# Patient Record
Sex: FEMALE | Race: WHITE | NOT HISPANIC OR LATINO | Employment: PART TIME | URBAN - METROPOLITAN AREA
[De-identification: names, ages, dates, MRNs, and addresses within clinical notes are randomized per-mention and may not be internally consistent; named-entity substitution may affect disease eponyms.]

---

## 2017-07-30 ENCOUNTER — HOSPITAL ENCOUNTER (OUTPATIENT)
Dept: PERIOP | Facility: HOSPITAL | Age: 57
Setting detail: HOSPITAL OUTPATIENT SURGERY
Discharge: HOME | End: 2017-07-30
Attending: ORTHOPAEDIC SURGERY | Admitting: ORTHOPAEDIC SURGERY

## 2017-08-29 ENCOUNTER — HOSPITAL ENCOUNTER (OUTPATIENT)
Dept: FAMILY MEDICINE CLINIC | Facility: CLINIC | Age: 57
Setting detail: SPECIMEN
Discharge: HOME | End: 2017-08-29
Attending: INTERNAL MEDICINE | Admitting: INTERNAL MEDICINE

## 2017-08-29 LAB
ALBUMIN SERPL-MCNC: 4.4 G/DL (ref 3.5–4.8)
ALBUMIN/GLOB SERPL: 1.7 {RATIO} (ref 1–1.7)
ALP SERPL-CCNC: 59 IU/L (ref 32–91)
ALT SERPL-CCNC: 21 IU/L (ref 14–54)
ANION GAP SERPL CALC-SCNC: 12.8 MMOL/L (ref 10–20)
AST SERPL-CCNC: 22 IU/L (ref 15–41)
BILIRUB SERPL-MCNC: 0.8 MG/DL (ref 0.3–1.2)
BUN SERPL-MCNC: 17 MG/DL (ref 8–20)
BUN/CREAT SERPL: 18.9 (ref 5.4–26.2)
CALCIUM SERPL-MCNC: 9.4 MG/DL (ref 8.9–10.3)
CHLORIDE SERPL-SCNC: 106 MMOL/L (ref 101–111)
CONV CO2: 24 MMOL/L (ref 22–32)
CONV TOTAL PROTEIN: 7 G/DL (ref 6.1–7.9)
CREAT UR-MCNC: 0.9 MG/DL (ref 0.4–1)
CRP SERPL-MCNC: 0.19 MG/DL (ref 0–0.7)
ERYTHROCYTE [SEDIMENTATION RATE] IN BLOOD BY WESTERGREN METHOD: 11 MM/HR (ref 0–30)
GLOBULIN UR ELPH-MCNC: 2.6 G/DL (ref 2.5–3.8)
GLUCOSE SERPL-MCNC: 82 MG/DL (ref 65–99)
POTASSIUM SERPL-SCNC: 3.8 MMOL/L (ref 3.6–5.1)
SODIUM SERPL-SCNC: 139 MMOL/L (ref 136–144)
T4 FREE SERPL-MCNC: 0.76 NG/DL (ref 0.58–1.64)
TSH SERPL-ACNC: 2.92 UIU/ML (ref 0.34–5.6)

## 2017-08-30 LAB
ANA SER QL IA: NORMAL
CHROMATIN AB SERPL-ACNC: <20 [IU]/ML (ref 0–20)

## 2017-12-05 ENCOUNTER — HOSPITAL ENCOUNTER (OUTPATIENT)
Dept: MAMMOGRAPHY | Facility: HOSPITAL | Age: 57
Discharge: HOME | End: 2017-12-05
Attending: NURSE PRACTITIONER | Admitting: NURSE PRACTITIONER

## 2017-12-11 ENCOUNTER — HOSPITAL ENCOUNTER (OUTPATIENT)
Dept: MAMMOGRAPHY | Facility: HOSPITAL | Age: 57
Discharge: HOME | End: 2017-12-11
Attending: NURSE PRACTITIONER | Admitting: NURSE PRACTITIONER

## 2018-01-08 ENCOUNTER — HOSPITAL ENCOUNTER (OUTPATIENT)
Dept: FAMILY MEDICINE CLINIC | Facility: CLINIC | Age: 58
Setting detail: SPECIMEN
Discharge: HOME | End: 2018-01-08
Attending: NURSE PRACTITIONER | Admitting: NURSE PRACTITIONER

## 2018-01-08 LAB
BASOPHILS # BLD AUTO: 0.1 10*3/UL (ref 0–0.2)
BASOPHILS NFR BLD AUTO: 1 % (ref 0–2)
DIFFERENTIAL METHOD BLD: (no result)
EOSINOPHIL # BLD AUTO: 0.1 10*3/UL (ref 0–0.3)
EOSINOPHIL # BLD AUTO: 1 % (ref 0–3)
ERYTHROCYTE [DISTWIDTH] IN BLOOD BY AUTOMATED COUNT: 14.1 % (ref 11.5–14.5)
HCT VFR BLD AUTO: 39.3 % (ref 35–49)
HGB BLD-MCNC: 13.1 G/DL (ref 12–15)
LYMPHOCYTES # BLD AUTO: 2.6 10*3/UL (ref 0.8–4.8)
LYMPHOCYTES NFR BLD AUTO: 35 % (ref 18–42)
MCH RBC QN AUTO: 28.9 PG (ref 26–32)
MCHC RBC AUTO-ENTMCNC: 33.3 G/DL (ref 32–36)
MCV RBC AUTO: 86.7 FL (ref 80–94)
MONOCYTES # BLD AUTO: 1 10*3/UL (ref 0.1–1.3)
MONOCYTES NFR BLD AUTO: 14 % (ref 2–11)
NEUTROPHILS # BLD AUTO: 3.8 10*3/UL (ref 2.3–8.6)
NEUTROPHILS NFR BLD AUTO: 49 % (ref 50–75)
NRBC BLD AUTO-RTO: 0 /100{WBCS}
NRBC/RBC NFR BLD MANUAL: 0 10*3/UL
PLATELET # BLD AUTO: 266 10*3/UL (ref 150–450)
PMV BLD AUTO: 8.9 FL (ref 7.4–10.4)
RBC # BLD AUTO: 4.53 10*6/UL (ref 4–5.4)
WBC # BLD AUTO: 7.6 10*3/UL (ref 4.5–11.5)

## 2018-03-16 ENCOUNTER — HOSPITAL ENCOUNTER (OUTPATIENT)
Dept: LAB | Facility: HOSPITAL | Age: 58
Discharge: HOME | End: 2018-03-16
Attending: INTERNAL MEDICINE | Admitting: INTERNAL MEDICINE

## 2018-03-16 LAB
ALBUMIN SERPL-MCNC: 4.1 G/DL (ref 3.5–4.8)
ALBUMIN/GLOB SERPL: 1.5 {RATIO} (ref 1–1.7)
ALP SERPL-CCNC: 47 IU/L (ref 32–91)
ALT SERPL-CCNC: 16 IU/L (ref 14–54)
ANA SER QL IA: NORMAL
ANION GAP SERPL CALC-SCNC: 7.8 MMOL/L (ref 10–20)
AST SERPL-CCNC: 20 IU/L (ref 15–41)
BILIRUB SERPL-MCNC: 1 MG/DL (ref 0.3–1.2)
BUN SERPL-MCNC: 11 MG/DL (ref 8–20)
BUN/CREAT SERPL: 12.2 (ref 5.4–26.2)
CALCIUM SERPL-MCNC: 9.5 MG/DL (ref 8.9–10.3)
CCP IGG ANTIBODIES: <0.4 U/ML
CHLORIDE SERPL-SCNC: 107 MMOL/L (ref 101–111)
CONV CO2: 29 MMOL/L (ref 22–32)
CONV TOTAL PROTEIN: 6.8 G/DL (ref 6.1–7.9)
CREAT UR-MCNC: 0.9 MG/DL (ref 0.4–1)
CRP SERPL-MCNC: 0.18 MG/DL (ref 0–0.7)
ERYTHROCYTE [DISTWIDTH] IN BLOOD BY AUTOMATED COUNT: 14.5 % (ref 11.5–14.5)
ERYTHROCYTE [SEDIMENTATION RATE] IN BLOOD BY WESTERGREN METHOD: 9 MM/HR (ref 0–30)
GLOBULIN UR ELPH-MCNC: 2.7 G/DL (ref 2.5–3.8)
GLUCOSE SERPL-MCNC: 91 MG/DL (ref 65–99)
HCT VFR BLD AUTO: 39.5 % (ref 35–49)
HGB BLD-MCNC: 13.1 G/DL (ref 12–15)
MCH RBC QN AUTO: 28.7 PG (ref 26–32)
MCHC RBC AUTO-ENTMCNC: 33.3 G/DL (ref 32–36)
MCV RBC AUTO: 86.3 FL (ref 80–94)
PLATELET # BLD AUTO: 241 10*3/UL (ref 150–450)
PMV BLD AUTO: 9.2 FL (ref 7.4–10.4)
POTASSIUM SERPL-SCNC: 3.8 MMOL/L (ref 3.6–5.1)
RBC # BLD AUTO: 4.57 10*6/UL (ref 4–5.4)
SODIUM SERPL-SCNC: 140 MMOL/L (ref 136–144)
WBC # BLD AUTO: 4.5 10*3/UL (ref 4.5–11.5)

## 2018-03-19 LAB — CHROMATIN AB SERPL-ACNC: <20 [IU]/ML (ref 0–20)

## 2018-06-15 ENCOUNTER — HOSPITAL ENCOUNTER (OUTPATIENT)
Dept: OTHER | Facility: HOSPITAL | Age: 58
Discharge: HOME | End: 2018-06-15
Attending: FAMILY MEDICINE | Admitting: FAMILY MEDICINE

## 2018-08-29 ENCOUNTER — HOSPITAL ENCOUNTER (OUTPATIENT)
Dept: LAB | Facility: HOSPITAL | Age: 58
Discharge: HOME | End: 2018-08-29
Attending: NURSE PRACTITIONER | Admitting: NURSE PRACTITIONER

## 2018-08-29 LAB
ALBUMIN SERPL-MCNC: 4.2 G/DL (ref 3.5–4.8)
ALBUMIN/GLOB SERPL: 1.5 {RATIO} (ref 1–1.7)
ALP SERPL-CCNC: 49 IU/L (ref 32–91)
ALT SERPL-CCNC: 24 IU/L (ref 14–54)
ANION GAP SERPL CALC-SCNC: 14 MMOL/L (ref 10–20)
AST SERPL-CCNC: 25 IU/L (ref 15–41)
BILIRUB SERPL-MCNC: 1 MG/DL (ref 0.3–1.2)
BILIRUB UR QL STRIP: NEGATIVE MG/DL
BUN SERPL-MCNC: 12 MG/DL (ref 8–20)
BUN/CREAT SERPL: 12 (ref 5.4–26.2)
CALCIUM SERPL-MCNC: 9.4 MG/DL (ref 8.9–10.3)
CASTS URNS QL MICRO: NORMAL /[LPF]
CHLORIDE SERPL-SCNC: 106 MMOL/L (ref 101–111)
CK SERPL-CCNC: 144 IU/L (ref 38–234)
COLOR UR: YELLOW
CONV BACTERIA IN URINE MICRO: NEGATIVE
CONV CLARITY OF URINE: CLEAR
CONV CO2: 25 MMOL/L (ref 22–32)
CONV HYALINE CASTS IN URINE MICRO: 0 /[LPF] (ref 0–5)
CONV PROTEIN IN URINE BY AUTOMATED TEST STRIP: NEGATIVE MG/DL
CONV SMALL ROUND CELLS: NORMAL /[HPF]
CONV TOTAL PROTEIN: 7 G/DL (ref 6.1–7.9)
CONV UROBILINOGEN IN URINE BY AUTOMATED TEST STRIP: 0.2 MG/DL
CREAT UR-MCNC: 1 MG/DL (ref 0.4–1)
CRP SERPL-MCNC: 0.06 MG/DL (ref 0–0.7)
CULTURE INDICATED?: NORMAL
ERYTHROCYTE [SEDIMENTATION RATE] IN BLOOD BY WESTERGREN METHOD: 13 MM/HR (ref 0–30)
GLOBULIN UR ELPH-MCNC: 2.8 G/DL (ref 2.5–3.8)
GLUCOSE SERPL-MCNC: 113 MG/DL (ref 65–99)
GLUCOSE UR QL: NEGATIVE MG/DL
HGB UR QL STRIP: NEGATIVE
KETONES UR QL STRIP: NEGATIVE MG/DL
LEUKOCYTE ESTERASE UR QL STRIP: NEGATIVE
MAGNESIUM SERPL-MCNC: 2 MG/DL (ref 1.8–2.5)
NITRITE UR QL STRIP: NEGATIVE
PH UR STRIP.AUTO: 7.5 [PH] (ref 4.5–8)
POTASSIUM SERPL-SCNC: 4 MMOL/L (ref 3.6–5.1)
RBC #/AREA URNS HPF: 0 /[HPF] (ref 0–3)
SODIUM SERPL-SCNC: 141 MMOL/L (ref 136–144)
SP GR UR: 1.01 (ref 1–1.03)
SPERM URNS QL MICRO: NORMAL /[HPF]
SQUAMOUS SPT QL MICRO: 2 /[HPF] (ref 0–5)
UNIDENT CRYS URNS QL MICRO: NORMAL /[HPF]
URATE SERPL-MCNC: 4.4 MG/DL (ref 2.6–8)
WBC #/AREA URNS HPF: 2 /[HPF] (ref 0–5)
YEAST SPEC QL WET PREP: NORMAL /[HPF]

## 2018-11-13 ENCOUNTER — HOSPITAL ENCOUNTER (OUTPATIENT)
Dept: MAMMOGRAPHY | Facility: HOSPITAL | Age: 58
Discharge: HOME | End: 2018-11-13
Attending: NURSE PRACTITIONER | Admitting: NURSE PRACTITIONER

## 2018-12-17 ENCOUNTER — HOSPITAL ENCOUNTER (OUTPATIENT)
Dept: URGENT CARE | Facility: CLINIC | Age: 58
Discharge: HOME | End: 2018-12-17
Attending: EMERGENCY MEDICINE | Admitting: EMERGENCY MEDICINE

## 2019-01-29 ENCOUNTER — HOSPITAL ENCOUNTER (OUTPATIENT)
Dept: FAMILY MEDICINE CLINIC | Facility: CLINIC | Age: 59
Setting detail: SPECIMEN
Discharge: HOME | End: 2019-01-29
Attending: INTERNAL MEDICINE | Admitting: INTERNAL MEDICINE

## 2019-01-29 LAB
BACTERIA SPEC AEROBE CULT: NORMAL
Lab: NORMAL
MICRO REPORT STATUS: NORMAL
SPECIMEN SOURCE: NORMAL

## 2019-06-05 ENCOUNTER — ON CAMPUS - OUTPATIENT (AMBULATORY)
Dept: URBAN - METROPOLITAN AREA HOSPITAL 2 | Facility: HOSPITAL | Age: 59
End: 2019-06-05
Payer: COMMERCIAL

## 2019-06-05 ENCOUNTER — OFFICE (AMBULATORY)
Dept: URBAN - METROPOLITAN AREA PATHOLOGY 4 | Facility: PATHOLOGY | Age: 59
End: 2019-06-05
Payer: COMMERCIAL

## 2019-06-05 VITALS
OXYGEN SATURATION: 100 % | WEIGHT: 243 LBS | HEART RATE: 60 BPM | DIASTOLIC BLOOD PRESSURE: 56 MMHG | HEART RATE: 73 BPM | RESPIRATION RATE: 16 BRPM | HEIGHT: 67 IN | HEART RATE: 68 BPM | HEART RATE: 66 BPM | OXYGEN SATURATION: 95 % | DIASTOLIC BLOOD PRESSURE: 65 MMHG | OXYGEN SATURATION: 97 % | SYSTOLIC BLOOD PRESSURE: 135 MMHG | DIASTOLIC BLOOD PRESSURE: 77 MMHG | OXYGEN SATURATION: 98 % | DIASTOLIC BLOOD PRESSURE: 74 MMHG | DIASTOLIC BLOOD PRESSURE: 70 MMHG | TEMPERATURE: 97.6 F | SYSTOLIC BLOOD PRESSURE: 147 MMHG | SYSTOLIC BLOOD PRESSURE: 179 MMHG | DIASTOLIC BLOOD PRESSURE: 80 MMHG | SYSTOLIC BLOOD PRESSURE: 173 MMHG | RESPIRATION RATE: 18 BRPM | HEART RATE: 65 BPM | SYSTOLIC BLOOD PRESSURE: 194 MMHG | SYSTOLIC BLOOD PRESSURE: 204 MMHG | SYSTOLIC BLOOD PRESSURE: 106 MMHG | DIASTOLIC BLOOD PRESSURE: 73 MMHG | HEART RATE: 64 BPM | HEART RATE: 62 BPM | OXYGEN SATURATION: 99 % | DIASTOLIC BLOOD PRESSURE: 71 MMHG | SYSTOLIC BLOOD PRESSURE: 109 MMHG | OXYGEN SATURATION: 96 % | SYSTOLIC BLOOD PRESSURE: 127 MMHG

## 2019-06-05 DIAGNOSIS — D12.5 BENIGN NEOPLASM OF SIGMOID COLON: ICD-10-CM

## 2019-06-05 DIAGNOSIS — K57.30 DIVERTICULOSIS OF LARGE INTESTINE WITHOUT PERFORATION OR ABS: ICD-10-CM

## 2019-06-05 DIAGNOSIS — Z12.11 ENCOUNTER FOR SCREENING FOR MALIGNANT NEOPLASM OF COLON: ICD-10-CM

## 2019-06-05 LAB
GI HISTOLOGY: A. UNSPECIFIED: (no result)
GI HISTOLOGY: PDF REPORT: (no result)

## 2019-06-05 PROCEDURE — 45385 COLONOSCOPY W/LESION REMOVAL: CPT | Performed by: INTERNAL MEDICINE

## 2019-06-05 PROCEDURE — 45380 COLONOSCOPY AND BIOPSY: CPT | Mod: 59 | Performed by: INTERNAL MEDICINE

## 2019-06-05 PROCEDURE — 88305 TISSUE EXAM BY PATHOLOGIST: CPT | Mod: 33 | Performed by: INTERNAL MEDICINE

## 2019-06-11 ENCOUNTER — CONVERSION ENCOUNTER (OUTPATIENT)
Dept: FAMILY MEDICINE CLINIC | Facility: CLINIC | Age: 59
End: 2019-06-11

## 2019-06-12 VITALS
HEART RATE: 76 BPM | BODY MASS INDEX: 40.81 KG/M2 | HEIGHT: 67 IN | RESPIRATION RATE: 16 BRPM | WEIGHT: 260 LBS | SYSTOLIC BLOOD PRESSURE: 124 MMHG | DIASTOLIC BLOOD PRESSURE: 60 MMHG

## 2019-07-19 DIAGNOSIS — M35.00 SJOGREN'S SYNDROME, WITH UNSPECIFIED ORGAN INVOLVEMENT (HCC): ICD-10-CM

## 2019-07-19 DIAGNOSIS — M15.9 OSTEOARTHRITIS, GENERALIZED: Primary | ICD-10-CM

## 2019-07-19 DIAGNOSIS — E55.9 VITAMIN D DEFICIENCY: ICD-10-CM

## 2019-07-19 DIAGNOSIS — M79.10 MYALGIA: ICD-10-CM

## 2019-07-19 DIAGNOSIS — M19.90 INFLAMMATORY ARTHRITIS: ICD-10-CM

## 2019-07-26 ENCOUNTER — LAB (OUTPATIENT)
Dept: LAB | Facility: HOSPITAL | Age: 59
End: 2019-07-26

## 2019-07-26 DIAGNOSIS — M35.00 SJOGREN'S SYNDROME, WITH UNSPECIFIED ORGAN INVOLVEMENT (HCC): ICD-10-CM

## 2019-07-26 DIAGNOSIS — E55.9 VITAMIN D DEFICIENCY: ICD-10-CM

## 2019-07-26 DIAGNOSIS — M15.9 OSTEOARTHRITIS, GENERALIZED: ICD-10-CM

## 2019-07-26 DIAGNOSIS — M19.90 INFLAMMATORY ARTHRITIS: ICD-10-CM

## 2019-07-26 DIAGNOSIS — M79.10 MYALGIA: ICD-10-CM

## 2019-07-26 LAB
25(OH)D3 SERPL-MCNC: 36.6 NG/ML (ref 30–100)
ALBUMIN SERPL-MCNC: 4.1 G/DL (ref 3.5–4.8)
ALBUMIN/GLOB SERPL: 1.8 G/DL (ref 1–1.7)
ALP SERPL-CCNC: 46 U/L (ref 32–91)
ALT SERPL W P-5'-P-CCNC: 21 U/L (ref 14–54)
ANION GAP SERPL CALCULATED.3IONS-SCNC: 12.3 MMOL/L (ref 5–15)
AST SERPL-CCNC: 23 U/L (ref 15–41)
BASOPHILS # BLD AUTO: 0 10*3/MM3 (ref 0–0.2)
BASOPHILS NFR BLD AUTO: 1.2 % (ref 0–1.5)
BILIRUB SERPL-MCNC: 1 MG/DL (ref 0.3–1.2)
BUN BLD-MCNC: 20 MG/DL (ref 8–20)
BUN/CREAT SERPL: 22.2 (ref 5.4–26.2)
CALCIUM SPEC-SCNC: 8.6 MG/DL (ref 8.9–10.3)
CHLORIDE SERPL-SCNC: 107 MMOL/L (ref 101–111)
CO2 SERPL-SCNC: 24 MMOL/L (ref 22–32)
CREAT BLD-MCNC: 0.9 MG/DL (ref 0.4–1)
CRP SERPL-MCNC: 0.13 MG/DL (ref 0–0.7)
DEPRECATED RDW RBC AUTO: 45.5 FL (ref 37–54)
EOSINOPHIL # BLD AUTO: 0.1 10*3/MM3 (ref 0–0.4)
EOSINOPHIL NFR BLD AUTO: 2.7 % (ref 0.3–6.2)
ERYTHROCYTE [DISTWIDTH] IN BLOOD BY AUTOMATED COUNT: 14.7 % (ref 12.3–15.4)
ERYTHROCYTE [SEDIMENTATION RATE] IN BLOOD: 13 MM/HR (ref 0–30)
GFR SERPL CREATININE-BSD FRML MDRD: 64 ML/MIN/1.73
GLOBULIN UR ELPH-MCNC: 2.3 GM/DL (ref 2.5–3.8)
GLUCOSE BLD-MCNC: 77 MG/DL (ref 65–99)
HCT VFR BLD AUTO: 36.2 % (ref 34–46.6)
HGB BLD-MCNC: 12.3 G/DL (ref 12–15.9)
LYMPHOCYTES # BLD AUTO: 1.7 10*3/MM3 (ref 0.7–3.1)
LYMPHOCYTES NFR BLD AUTO: 41.5 % (ref 19.6–45.3)
MCH RBC QN AUTO: 29.9 PG (ref 26.6–33)
MCHC RBC AUTO-ENTMCNC: 33.9 G/DL (ref 31.5–35.7)
MCV RBC AUTO: 88.3 FL (ref 79–97)
MONOCYTES # BLD AUTO: 0.5 10*3/MM3 (ref 0.1–0.9)
MONOCYTES NFR BLD AUTO: 12.8 % (ref 5–12)
NEUTROPHILS # BLD AUTO: 1.7 10*3/MM3 (ref 1.7–7)
NEUTROPHILS NFR BLD AUTO: 41.8 % (ref 42.7–76)
NRBC BLD AUTO-RTO: 0.1 /100 WBC (ref 0–0.2)
PLATELET # BLD AUTO: 195 10*3/MM3 (ref 140–450)
PMV BLD AUTO: 9.1 FL (ref 6–12)
POTASSIUM BLD-SCNC: 4.3 MMOL/L (ref 3.6–5.1)
PROT SERPL-MCNC: 6.4 G/DL (ref 6.1–7.9)
RBC # BLD AUTO: 4.1 10*6/MM3 (ref 3.77–5.28)
SODIUM BLD-SCNC: 139 MMOL/L (ref 136–144)
WBC NRBC COR # BLD: 4 10*3/MM3 (ref 3.4–10.8)

## 2019-07-26 PROCEDURE — 82306 VITAMIN D 25 HYDROXY: CPT | Performed by: INTERNAL MEDICINE

## 2019-07-26 PROCEDURE — 85652 RBC SED RATE AUTOMATED: CPT | Performed by: INTERNAL MEDICINE

## 2019-07-26 PROCEDURE — 80053 COMPREHEN METABOLIC PANEL: CPT | Performed by: INTERNAL MEDICINE

## 2019-07-26 PROCEDURE — 86140 C-REACTIVE PROTEIN: CPT | Performed by: INTERNAL MEDICINE

## 2019-07-26 PROCEDURE — 36415 COLL VENOUS BLD VENIPUNCTURE: CPT

## 2019-07-26 PROCEDURE — 85027 COMPLETE CBC AUTOMATED: CPT | Performed by: INTERNAL MEDICINE

## 2019-07-29 ENCOUNTER — TELEPHONE (OUTPATIENT)
Dept: FAMILY MEDICINE CLINIC | Facility: CLINIC | Age: 59
End: 2019-07-29

## 2019-07-29 NOTE — TELEPHONE ENCOUNTER
VM MESSAGE.  PATIENT IS ASKING IF YOU WOULD TAKE OVER HER RHEUMATOLOGY CARE. THEY HAVE CANCELLED ON HER 3 TIMES AND NOW SHE CAN'T SEE THEM TILL January 2020. THANK YOU.

## 2019-07-29 NOTE — TELEPHONE ENCOUNTER
GAVE PATIENT MESSAGE. SHE JUST HAD LABS DONE ON 7/26/19 AND THEY ARE IN HER CHART. SHE WAS ASKING WHEN SHE SHOULD SCHEDULE APPT. WITH YOU. SHE WAS LAST SEEN BY YOU 1/2019 FOR DIZZINESS. THANK YOU.

## 2019-08-13 PROBLEM — M35.00 SJOGREN'S SYNDROME: Status: ACTIVE | Noted: 2017-02-28

## 2019-08-13 PROBLEM — M19.049 OSTEOARTHRITIS OF HAND: Status: ACTIVE | Noted: 2018-03-23

## 2019-08-13 PROBLEM — R06.02 SHORTNESS OF BREATH: Status: ACTIVE | Noted: 2018-01-08

## 2019-08-13 PROBLEM — E55.9 VITAMIN D DEFICIENCY: Status: ACTIVE | Noted: 2018-03-23

## 2019-08-13 RX ORDER — HYDROXYCHLOROQUINE SULFATE 200 MG/1
TABLET, FILM COATED ORAL
COMMUNITY
Start: 2017-12-21 | End: 2019-08-14

## 2019-08-13 RX ORDER — LEVOTHYROXINE SODIUM 112 UG/1
112 CAPSULE ORAL DAILY
COMMUNITY
Start: 2019-05-20 | End: 2020-09-18

## 2019-08-13 RX ORDER — DICLOFENAC SODIUM 75 MG/1
TABLET, DELAYED RELEASE ORAL
COMMUNITY
End: 2019-11-01

## 2019-08-13 RX ORDER — BENZONATATE 200 MG/1
CAPSULE ORAL
COMMUNITY
Start: 2019-06-11 | End: 2019-11-20

## 2019-08-13 RX ORDER — HYDROXYCHLOROQUINE SULFATE 200 MG/1
TABLET, FILM COATED ORAL
COMMUNITY
Start: 2019-07-04 | End: 2019-08-14

## 2019-08-13 RX ORDER — LIFITEGRAST 50 MG/ML
1 SOLUTION/ DROPS OPHTHALMIC 2 TIMES DAILY
COMMUNITY
Start: 2019-07-22 | End: 2020-11-13

## 2019-08-13 RX ORDER — CETIRIZINE HYDROCHLORIDE 10 MG/1
10 TABLET ORAL DAILY
COMMUNITY
Start: 2019-06-12 | End: 2020-04-21

## 2019-08-14 ENCOUNTER — OFFICE VISIT (OUTPATIENT)
Dept: FAMILY MEDICINE CLINIC | Facility: CLINIC | Age: 59
End: 2019-08-14

## 2019-08-14 ENCOUNTER — TELEPHONE (OUTPATIENT)
Dept: FAMILY MEDICINE CLINIC | Facility: CLINIC | Age: 59
End: 2019-08-14

## 2019-08-14 VITALS
DIASTOLIC BLOOD PRESSURE: 76 MMHG | OXYGEN SATURATION: 98 % | SYSTOLIC BLOOD PRESSURE: 152 MMHG | TEMPERATURE: 97.8 F | WEIGHT: 245 LBS | RESPIRATION RATE: 16 BRPM | HEART RATE: 58 BPM | HEIGHT: 67 IN | BODY MASS INDEX: 38.45 KG/M2

## 2019-08-14 DIAGNOSIS — M19.90 INFLAMMATORY ARTHRITIS: Primary | ICD-10-CM

## 2019-08-14 DIAGNOSIS — M35.00 SJOGREN'S SYNDROME WITHOUT EXTRAGLANDULAR INVOLVEMENT (HCC): ICD-10-CM

## 2019-08-14 DIAGNOSIS — E03.9 ACQUIRED HYPOTHYROIDISM: ICD-10-CM

## 2019-08-14 DIAGNOSIS — E55.9 VITAMIN D DEFICIENCY: ICD-10-CM

## 2019-08-14 PROCEDURE — 99213 OFFICE O/P EST LOW 20 MIN: CPT | Performed by: INTERNAL MEDICINE

## 2019-08-14 RX ORDER — HYDROXYCHLOROQUINE SULFATE 200 MG/1
200 TABLET, FILM COATED ORAL DAILY
Qty: 60 TABLET | Refills: 5 | Status: SHIPPED | OUTPATIENT
Start: 2019-08-14 | End: 2019-08-18

## 2019-08-14 RX ORDER — DULOXETIN HYDROCHLORIDE 60 MG/1
60 CAPSULE, DELAYED RELEASE ORAL DAILY
Qty: 30 CAPSULE | Refills: 6 | Status: SHIPPED | OUTPATIENT
Start: 2019-08-14 | End: 2019-11-20

## 2019-08-14 NOTE — TELEPHONE ENCOUNTER
Patient came in and was confused with her print out list of medications that states she is to take Hydroxychloroquine 1 tablet by mouth daily.   She said in visit she discussed she was taking 3 a day but Doctor told her to drop to 2 a day..this sheet indicates only once a day and she just wants to confirm if Doctor meant to put twice a day as discussed in visit?

## 2019-08-14 NOTE — PROGRESS NOTES
"Rooming Tab(CC,VS,Pt Hx,Fall Screen)  Chief Complaint   Patient presents with   • Arthritis   • Fibromyalgia       Subjective   Pt here for rheum issues- Dr Frey and NP both no longer at practice- and needs continued care.  On plaquenil 3/day currently- hands and feet doing ok- no swelling- still with stiffness- was told might have fibromyaglia as well   seen eye MD every 6 months   no chest pain, breathing and sleeping well  I have reviewed and updated her medications, medical history and problem list during today's office visit.     Patient Care Team:  Kerry Mayes MD as PCP - General    Problem List Tab  Medications Tab  Synopsis Tab  Chart Review Tab  Care Everywhere Tab  Immunizations Tab  Patient History Tab    Social History     Tobacco Use   • Smoking status: Former Smoker     Types: Cigarettes     Start date:      Last attempt to quit:      Years since quittin.6   • Smokeless tobacco: Never Used   Substance Use Topics   • Alcohol use: No     Frequency: Never       Review of Systems   Constitutional: Negative for fatigue.   Respiratory: Negative for apnea and wheezing.    Cardiovascular: Negative for chest pain.   Gastrointestinal: Negative for abdominal distention.   Musculoskeletal: Positive for arthralgias.       Objective     Rooming Tab(CC,VS,Pt Hx,Fall Screen)  /76 (BP Location: Right arm, Patient Position: Sitting)   Pulse 58   Temp 97.8 °F (36.6 °C) (Oral)   Resp 16   Ht 170.2 cm (67\")   Wt 111 kg (245 lb)   SpO2 98%   BMI 38.37 kg/m²     Body mass index is 38.37 kg/m².    Physical Exam   Constitutional: She is oriented to person, place, and time. She appears well-developed and well-nourished.   HENT:   Head: Normocephalic and atraumatic.   Right Ear: Tympanic membrane normal.   Left Ear: Tympanic membrane normal.   Eyes: Pupils are equal, round, and reactive to light.   Neck: Normal range of motion. Neck supple.   Cardiovascular: Normal rate and regular rhythm. "   No murmur heard.  Pulmonary/Chest: Effort normal and breath sounds normal.   Abdominal: Soft. Bowel sounds are normal. She exhibits no distension.   Neurological: She is oriented to person, place, and time.   Skin: Capillary refill takes less than 2 seconds.   Nursing note and vitals reviewed.       Statin Choice Calculator  Data Reviewed:               Lab Results   Component Value Date    BUN 20 07/26/2019    CREATININE 0.90 07/26/2019    EGFRIFNONA 64 07/26/2019     07/26/2019    K 4.3 07/26/2019     07/26/2019    CALCIUM 8.6 (L) 07/26/2019    ALBUMIN 4.10 07/26/2019    BILITOT 1.0 07/26/2019    ALKPHOS 46 07/26/2019    AST 23 07/26/2019    ALT 21 07/26/2019    WBC 4.00 07/26/2019    RBC 4.10 07/26/2019    HCT 36.2 07/26/2019    MCV 88.3 07/26/2019    MCH 29.9 07/26/2019    SDRZ43WR 36.6 07/26/2019      Assessment/Plan   Order Review Tab  Health Maintenance Tab  Patient Plan/Order Tab  Diagnoses and all orders for this visit:    1. Inflammatory arthritis (Primary)  Comments:  decrease plaquenil to 2 tablets/day   routine labs every 4-6 months  discussed results    2. Sjogren's syndrome without extraglandular involvement (CMS/HCC)    3. Vitamin D deficiency    4. Acquired hypothyroidism    Other orders  -     DULoxetine (CYMBALTA) 60 MG capsule; Take 1 capsule by mouth Daily.  Dispense: 30 capsule; Refill: 6  -     Discontinue: hydroxychloroquine (PLAQUENIL) 200 MG tablet; Take 1 tablet by mouth Daily.  Dispense: 60 tablet; Refill: 5  -     hydroxychloroquine (PLAQUENIL) 200 MG tablet; Take 1 tablet by mouth 2 (Two) Times a Day.  Dispense: 60 tablet; Refill: 5        Wrapup Tab  Return in about 6 months (around 2/14/2020).       They were informed of the diagnosis and treatment plan and directed to f/u for any further problems or concerns.

## 2019-08-18 RX ORDER — HYDROXYCHLOROQUINE SULFATE 200 MG/1
200 TABLET, FILM COATED ORAL 2 TIMES DAILY
Qty: 60 TABLET | Refills: 5 | Status: SHIPPED | OUTPATIENT
Start: 2019-08-18 | End: 2020-09-18

## 2019-08-20 ENCOUNTER — PROCEDURE VISIT (OUTPATIENT)
Dept: NEUROLOGY | Facility: CLINIC | Age: 59
End: 2019-08-20

## 2019-08-20 DIAGNOSIS — M79.642 PAIN IN BOTH HANDS: ICD-10-CM

## 2019-08-20 DIAGNOSIS — M79.641 PAIN IN BOTH HANDS: ICD-10-CM

## 2019-08-20 DIAGNOSIS — G56.01 CARPAL TUNNEL SYNDROME OF RIGHT WRIST: Primary | ICD-10-CM

## 2019-08-20 PROCEDURE — 95886 MUSC TEST DONE W/N TEST COMP: CPT | Performed by: PSYCHIATRY & NEUROLOGY

## 2019-08-20 PROCEDURE — 95910 NRV CNDJ TEST 7-8 STUDIES: CPT | Performed by: PSYCHIATRY & NEUROLOGY

## 2019-08-20 NOTE — PROGRESS NOTES
EMG and Nerve Conduction Studies    The complete report includes the data sheets.     Referring Doctor: Mark Davis MD      Indication: possible CTS    History: C/O bilateral wrist pain    Are you a diabetic? noHepatitis?noHIV?noPacemaker?noDefibrillator?noBlood thinner?noAre you allergic to latex or tape?yes    Results:    1.  The right median and median palmar latencies are prolonged.  The right median motor NCS's are normal.    2.  The left median sensor and motor NCS's are normal    3.  EMG of the muscles tested in the C5-T1 myotomes were normal bilaterally.    4.  The Ulnar NCS's were normal.    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       Joseph Seipel, M.D.

## 2019-10-16 ENCOUNTER — TRANSCRIBE ORDERS (OUTPATIENT)
Dept: ADMINISTRATIVE | Facility: HOSPITAL | Age: 59
End: 2019-10-16

## 2019-10-16 DIAGNOSIS — Z12.39 SCREENING BREAST EXAMINATION: Primary | ICD-10-CM

## 2019-11-01 ENCOUNTER — TELEPHONE (OUTPATIENT)
Dept: FAMILY MEDICINE CLINIC | Facility: CLINIC | Age: 59
End: 2019-11-01

## 2019-11-01 RX ORDER — CELECOXIB 200 MG/1
200 CAPSULE ORAL DAILY
Qty: 90 CAPSULE | Refills: 0 | Status: SHIPPED | OUTPATIENT
Start: 2019-11-01 | End: 2020-09-18

## 2019-11-01 NOTE — TELEPHONE ENCOUNTER
VM MESSAGE.  Patient is requesting 3-month supply of Celebrex to be sent to pharmacy. She is losing her insurance and wants to know if you need to see her now, or in February when she gets her insurance back. Thank you.

## 2019-11-04 RX ORDER — DICLOFENAC SODIUM 75 MG/1
TABLET, DELAYED RELEASE ORAL
Qty: 180 TABLET | Refills: 0 | OUTPATIENT
Start: 2019-11-04

## 2019-11-05 ENCOUNTER — TELEPHONE (OUTPATIENT)
Dept: RHEUMATOLOGY | Facility: CLINIC | Age: 59
End: 2019-11-05

## 2019-11-05 NOTE — TELEPHONE ENCOUNTER
Please contact patient and refer her out to other rheumatology. Patient has only seen Yudith and Dr NEWTON, needs to be referred out.

## 2019-11-12 ENCOUNTER — TRANSCRIBE ORDERS (OUTPATIENT)
Dept: ADMINISTRATIVE | Facility: HOSPITAL | Age: 59
End: 2019-11-12

## 2019-11-12 DIAGNOSIS — M85.88 OTHER SPECIFIED DISORDERS OF BONE DENSITY AND STRUCTURE, OTHER SITE: Primary | ICD-10-CM

## 2019-11-14 ENCOUNTER — HOSPITAL ENCOUNTER (OUTPATIENT)
Dept: MAMMOGRAPHY | Facility: HOSPITAL | Age: 59
Discharge: HOME | End: 2019-11-14
Admitting: INTERNAL MEDICINE

## 2019-11-14 DIAGNOSIS — Z12.39 SCREENING BREAST EXAMINATION: ICD-10-CM

## 2019-11-14 PROCEDURE — 77063 BREAST TOMOSYNTHESIS BI: CPT

## 2019-11-14 PROCEDURE — 77067 SCR MAMMO BI INCL CAD: CPT

## 2019-11-15 ENCOUNTER — APPOINTMENT (OUTPATIENT)
Dept: MAMMOGRAPHY | Facility: HOSPITAL | Age: 59
End: 2019-11-15

## 2019-11-20 ENCOUNTER — HOSPITAL ENCOUNTER (OUTPATIENT)
Facility: HOSPITAL | Age: 59
Setting detail: OBSERVATION
Discharge: HOME | End: 2019-11-21
Attending: INTERNAL MEDICINE | Admitting: HOSPITALIST

## 2019-11-20 DIAGNOSIS — L03.116 CELLULITIS OF LEFT LOWER EXTREMITY: Primary | ICD-10-CM

## 2019-11-20 LAB
ANION GAP SERPL CALCULATED.3IONS-SCNC: 14 MMOL/L (ref 5–15)
BASOPHILS # BLD AUTO: 0.1 10*3/MM3 (ref 0–0.2)
BASOPHILS NFR BLD AUTO: 0.9 % (ref 0–1.5)
BUN BLD-MCNC: 16 MG/DL (ref 6–20)
BUN/CREAT SERPL: 17.6 (ref 7–25)
CALCIUM SPEC-SCNC: 9.5 MG/DL (ref 8.6–10.5)
CHLORIDE SERPL-SCNC: 105 MMOL/L (ref 98–107)
CO2 SERPL-SCNC: 25 MMOL/L (ref 22–29)
CREAT BLD-MCNC: 0.91 MG/DL (ref 0.57–1)
DEPRECATED RDW RBC AUTO: 43.8 FL (ref 37–54)
EOSINOPHIL # BLD AUTO: 0.1 10*3/MM3 (ref 0–0.4)
EOSINOPHIL NFR BLD AUTO: 2.5 % (ref 0.3–6.2)
ERYTHROCYTE [DISTWIDTH] IN BLOOD BY AUTOMATED COUNT: 14.5 % (ref 12.3–15.4)
GFR SERPL CREATININE-BSD FRML MDRD: 63 ML/MIN/1.73
GLUCOSE BLD-MCNC: 117 MG/DL (ref 65–99)
HCT VFR BLD AUTO: 35.9 % (ref 34–46.6)
HGB BLD-MCNC: 12.3 G/DL (ref 12–15.9)
LYMPHOCYTES # BLD AUTO: 1.8 10*3/MM3 (ref 0.7–3.1)
LYMPHOCYTES NFR BLD AUTO: 29.6 % (ref 19.6–45.3)
MCH RBC QN AUTO: 29.8 PG (ref 26.6–33)
MCHC RBC AUTO-ENTMCNC: 34.1 G/DL (ref 31.5–35.7)
MCV RBC AUTO: 87.4 FL (ref 79–97)
MONOCYTES # BLD AUTO: 0.8 10*3/MM3 (ref 0.1–0.9)
MONOCYTES NFR BLD AUTO: 14.1 % (ref 5–12)
NEUTROPHILS # BLD AUTO: 3.1 10*3/MM3 (ref 1.7–7)
NEUTROPHILS NFR BLD AUTO: 52.9 % (ref 42.7–76)
NRBC BLD AUTO-RTO: 0.1 /100 WBC (ref 0–0.2)
PLATELET # BLD AUTO: 197 10*3/MM3 (ref 140–450)
PMV BLD AUTO: 8.5 FL (ref 6–12)
POTASSIUM BLD-SCNC: 3.7 MMOL/L (ref 3.5–5.2)
RBC # BLD AUTO: 4.11 10*6/MM3 (ref 3.77–5.28)
SODIUM BLD-SCNC: 144 MMOL/L (ref 136–145)
WBC NRBC COR # BLD: 5.9 10*3/MM3 (ref 3.4–10.8)

## 2019-11-20 PROCEDURE — G0378 HOSPITAL OBSERVATION PER HR: HCPCS

## 2019-11-20 PROCEDURE — 80048 BASIC METABOLIC PNL TOTAL CA: CPT | Performed by: NURSE PRACTITIONER

## 2019-11-20 PROCEDURE — 87040 BLOOD CULTURE FOR BACTERIA: CPT | Performed by: NURSE PRACTITIONER

## 2019-11-20 PROCEDURE — 96365 THER/PROPH/DIAG IV INF INIT: CPT

## 2019-11-20 PROCEDURE — 99218 PR INITIAL OBSERVATION CARE/DAY 30 MINUTES: CPT | Performed by: NURSE PRACTITIONER

## 2019-11-20 PROCEDURE — 99283 EMERGENCY DEPT VISIT LOW MDM: CPT

## 2019-11-20 PROCEDURE — 85025 COMPLETE CBC W/AUTO DIFF WBC: CPT | Performed by: NURSE PRACTITIONER

## 2019-11-20 RX ORDER — ONDANSETRON 4 MG/1
4 TABLET, FILM COATED ORAL EVERY 6 HOURS PRN
Status: DISCONTINUED | OUTPATIENT
Start: 2019-11-20 | End: 2019-11-21 | Stop reason: HOSPADM

## 2019-11-20 RX ORDER — HYDROXYCHLOROQUINE SULFATE 200 MG/1
200 TABLET, FILM COATED ORAL 2 TIMES DAILY
Status: DISCONTINUED | OUTPATIENT
Start: 2019-11-20 | End: 2019-11-21 | Stop reason: HOSPADM

## 2019-11-20 RX ORDER — LEVOTHYROXINE SODIUM 112 UG/1
112 TABLET ORAL
Status: DISCONTINUED | OUTPATIENT
Start: 2019-11-21 | End: 2019-11-21 | Stop reason: HOSPADM

## 2019-11-20 RX ORDER — ACETAMINOPHEN 325 MG/1
650 TABLET ORAL EVERY 4 HOURS PRN
Status: DISCONTINUED | OUTPATIENT
Start: 2019-11-20 | End: 2019-11-21 | Stop reason: HOSPADM

## 2019-11-20 RX ORDER — SODIUM CHLORIDE 0.9 % (FLUSH) 0.9 %
10 SYRINGE (ML) INJECTION AS NEEDED
Status: DISCONTINUED | OUTPATIENT
Start: 2019-11-20 | End: 2019-11-21 | Stop reason: HOSPADM

## 2019-11-20 RX ORDER — ONDANSETRON 2 MG/ML
4 INJECTION INTRAMUSCULAR; INTRAVENOUS EVERY 6 HOURS PRN
Status: DISCONTINUED | OUTPATIENT
Start: 2019-11-20 | End: 2019-11-21 | Stop reason: HOSPADM

## 2019-11-20 RX ORDER — ACETAMINOPHEN 160 MG/5ML
650 SOLUTION ORAL EVERY 4 HOURS PRN
Status: DISCONTINUED | OUTPATIENT
Start: 2019-11-20 | End: 2019-11-21 | Stop reason: HOSPADM

## 2019-11-20 RX ORDER — CELECOXIB 200 MG/1
200 CAPSULE ORAL DAILY
Status: DISCONTINUED | OUTPATIENT
Start: 2019-11-21 | End: 2019-11-21 | Stop reason: HOSPADM

## 2019-11-20 RX ORDER — CETIRIZINE HYDROCHLORIDE 10 MG/1
10 TABLET ORAL DAILY
Status: DISCONTINUED | OUTPATIENT
Start: 2019-11-21 | End: 2019-11-21 | Stop reason: HOSPADM

## 2019-11-20 RX ORDER — CLINDAMYCIN PHOSPHATE 600 MG/50ML
600 INJECTION, SOLUTION INTRAVENOUS EVERY 6 HOURS
Status: DISCONTINUED | OUTPATIENT
Start: 2019-11-21 | End: 2019-11-21 | Stop reason: HOSPADM

## 2019-11-20 RX ORDER — SODIUM CHLORIDE 0.9 % (FLUSH) 0.9 %
10 SYRINGE (ML) INJECTION EVERY 12 HOURS SCHEDULED
Status: DISCONTINUED | OUTPATIENT
Start: 2019-11-20 | End: 2019-11-21 | Stop reason: HOSPADM

## 2019-11-20 RX ORDER — CLINDAMYCIN PHOSPHATE 600 MG/50ML
600 INJECTION, SOLUTION INTRAVENOUS ONCE
Status: COMPLETED | OUTPATIENT
Start: 2019-11-20 | End: 2019-11-20

## 2019-11-20 RX ORDER — ACETAMINOPHEN 650 MG/1
650 SUPPOSITORY RECTAL EVERY 4 HOURS PRN
Status: DISCONTINUED | OUTPATIENT
Start: 2019-11-20 | End: 2019-11-21 | Stop reason: HOSPADM

## 2019-11-20 RX ORDER — CHOLECALCIFEROL (VITAMIN D3) 125 MCG
5 CAPSULE ORAL NIGHTLY PRN
Status: DISCONTINUED | OUTPATIENT
Start: 2019-11-20 | End: 2019-11-21 | Stop reason: HOSPADM

## 2019-11-20 RX ADMIN — Medication 10 ML: at 22:38

## 2019-11-20 RX ADMIN — HYDROXYCHLOROQUINE SULFATE 200 MG: 200 TABLET, FILM COATED ORAL at 22:39

## 2019-11-20 RX ADMIN — CLINDAMYCIN PHOSPHATE 600 MG: 600 INJECTION, SOLUTION INTRAVENOUS at 20:34

## 2019-11-20 NOTE — ED PROVIDER NOTES
Subjective   Chief Complaint: Cellulitis  Context: Patient reports cellulitis on her left lower extremity since 3 days ago.  She reports she was seen yesterday and started on clindamycin, she states that she has had 4 doses.  She states the redness has continued to increase.  Duration: 3 days  Timing: Constant  Severity: Mild to moderate  Associated symptoms and or modifying factors: As above          History provided by:  Patient      Review of Systems   Constitutional: Negative for chills and fever.   HENT: Negative for congestion and sore throat.    Eyes: Negative for redness.   Respiratory: Negative for cough and shortness of breath.    Cardiovascular: Negative for chest pain.   Gastrointestinal: Negative for abdominal pain, diarrhea, nausea and vomiting.   Genitourinary: Negative for dysuria.   Musculoskeletal: Positive for myalgias. Negative for back pain.   Skin: Positive for color change and rash.   Neurological: Negative for headaches.   All other systems reviewed and are negative.      Past Medical History:   Diagnosis Date   • Arthritis    • Concussion    • Fibromyalgia, primary    • Pancreatitis        Allergies   Allergen Reactions   • Codeine Anaphylaxis   • Hydrocodone-Acetaminophen Hives   • Latex Hives   • Penicillins Anaphylaxis, Hives and Shortness Of Breath   • Sulfa Antibiotics Hives   • Sulfur Hives and Itching   • Tramadol Hives       Past Surgical History:   Procedure Laterality Date   • CHOLECYSTECTOMY     • COLONOSCOPY     • ENDOMETRIAL ABLATION     • EYE SURGERY     • HYSTERECTOMY     • OOPHORECTOMY     • TONSILLECTOMY     • TUBAL ABDOMINAL LIGATION         Family History   Problem Relation Age of Onset   • Hyperlipidemia Mother    • Stroke Daughter    • Cancer Paternal Aunt    • Arthritis Paternal Aunt    • COPD Paternal Uncle        Social History     Socioeconomic History   • Marital status:      Spouse name: Not on file   • Number of children: Not on file   • Years of education:  Not on file   • Highest education level: Not on file   Tobacco Use   • Smoking status: Former Smoker     Types: Cigarettes     Start date:      Last attempt to quit:      Years since quittin.9   • Smokeless tobacco: Never Used   Substance and Sexual Activity   • Alcohol use: No     Frequency: Never   • Drug use: No   • Sexual activity: Not Currently           Objective   Physical Exam  The patient is well-developed, well-nourished, alert, cooperative and in no acute distress.    HEENT exam is normocephalic and atraumatic. Pupils equal ,round, reactive to light. Extraocular muscles are intact. Conjunctivae non- injected, sclerae anicteric, lids without ptosis, edema or erythema. Mucous membranes are moist.     Neck is supple, non-tender without lymphadenopathy. No JVD, bruit or stridor noted.     Lungs clear to auscultation bilaterally.    Cardiovascular. Regular rate and rhythm     Abdomen is soft, nontender, nondistended. No guarding or rebound noted.    Back shows no CVA tenderness.      Extremities: There is no significant deformity or joint abnormality.  Left lower extremity with edema, erythema, and warmth.  There is no fluctuant abscess.  Peripheral pulses are intact.    Neurologic: Oriented x3 without focal neurological deficits.    Skin shows no  petechiae, or purpura.    Procedures           ED Course        Labs Reviewed   BASIC METABOLIC PANEL - Abnormal; Notable for the following components:       Result Value    Glucose 117 (*)     All other components within normal limits    Narrative:     GFR Normal >60  Chronic Kidney Disease <60  Kidney Failure <15   CBC WITH AUTO DIFFERENTIAL - Abnormal; Notable for the following components:    Monocyte % 14.1 (*)     All other components within normal limits   BLOOD CULTURE   BLOOD CULTURE   CBC AND DIFFERENTIAL    Narrative:     The following orders were created for panel order CBC & Differential.  Procedure                               Abnormality   "       Status                     ---------                               -----------         ------                     CBC Auto Differential[[id number]]        Abnormal            Final result                 Please view results for these tests on the individual orders.     No radiology results for the last day  Medications   sodium chloride 0.9 % flush 10 mL (not administered)   clindamycin (CLEOCIN) 600 mg in sodium chloride 0.9% 50 mL IVPB (premix) (not administered)     /61 (BP Location: Right arm, Patient Position: Sitting)   Pulse 68   Temp 97.8 °F (36.6 °C) (Oral)   Resp 17   Ht 170.2 cm (67\")   Wt 113 kg (249 lb 1.9 oz)   SpO2 100%   Breastfeeding? No   BMI 39.02 kg/m²             MDM  Number of Diagnoses or Management Options  Cellulitis of left lower extremity:   Diagnosis management comments: MEDICAL DECISION  Comorbidities: Noted in past history  Differentials:cellulitis, abscess, dermatitis; this list is not all inclusive and does not constitute the entirety of considered causes    Lab interpretation:  Labs viewed by me significant for, Rolando 117    Patient will be admitted for IV antibiotics as she has failed outpatient antibiotic therapy with her redness, warmth, and swelling progressively getting worse.  Results and plan for admission were discussed.  Discussed with Maris for Dr. Maldonado who agreed to admit         Amount and/or Complexity of Data Reviewed  Clinical lab tests: ordered and reviewed        Final diagnoses:   Cellulitis of left lower extremity              Chen Norton, APRN  11/20/19 2034    "

## 2019-11-20 NOTE — ED NOTES
Redness, swelling and warmth to left lower leg. Noticed area 2 days ago. Was seen at [facility] yesterday and was dx with cellulitis. Was given clindamycin. Has taken 4 doses. Reports increased pain and swelling today     Molly Alexandra RN  11/20/19 2786

## 2019-11-21 VITALS
RESPIRATION RATE: 16 BRPM | HEIGHT: 67 IN | TEMPERATURE: 98 F | DIASTOLIC BLOOD PRESSURE: 71 MMHG | OXYGEN SATURATION: 96 % | SYSTOLIC BLOOD PRESSURE: 131 MMHG | HEART RATE: 67 BPM | WEIGHT: 246.91 LBS | BODY MASS INDEX: 38.75 KG/M2

## 2019-11-21 LAB
ANION GAP SERPL CALCULATED.3IONS-SCNC: 10 MMOL/L (ref 5–15)
BASOPHILS # BLD AUTO: 0 10*3/MM3 (ref 0–0.2)
BASOPHILS NFR BLD AUTO: 0.9 % (ref 0–1.5)
BUN BLD-MCNC: 14 MG/DL (ref 6–20)
BUN/CREAT SERPL: 17.7 (ref 7–25)
CALCIUM SPEC-SCNC: 8.7 MG/DL (ref 8.6–10.5)
CHLORIDE SERPL-SCNC: 108 MMOL/L (ref 98–107)
CK SERPL-CCNC: 178 U/L (ref 20–180)
CO2 SERPL-SCNC: 25 MMOL/L (ref 22–29)
CREAT BLD-MCNC: 0.79 MG/DL (ref 0.57–1)
CRP SERPL-MCNC: 4.32 MG/DL (ref 0–0.5)
D DIMER PPP FEU-MCNC: 0.36 MCGFEU/ML (ref 0.17–0.59)
DEPRECATED RDW RBC AUTO: 43.8 FL (ref 37–54)
EOSINOPHIL # BLD AUTO: 0.2 10*3/MM3 (ref 0–0.4)
EOSINOPHIL NFR BLD AUTO: 3.2 % (ref 0.3–6.2)
ERYTHROCYTE [DISTWIDTH] IN BLOOD BY AUTOMATED COUNT: 14 % (ref 12.3–15.4)
ERYTHROCYTE [SEDIMENTATION RATE] IN BLOOD: 29 MM/HR (ref 0–30)
GFR SERPL CREATININE-BSD FRML MDRD: 74 ML/MIN/1.73
GLUCOSE BLD-MCNC: 93 MG/DL (ref 65–99)
HBA1C MFR BLD: 5 % (ref 3.5–5.6)
HCT VFR BLD AUTO: 33.4 % (ref 34–46.6)
HGB BLD-MCNC: 11.2 G/DL (ref 12–15.9)
LYMPHOCYTES # BLD AUTO: 1.6 10*3/MM3 (ref 0.7–3.1)
LYMPHOCYTES NFR BLD AUTO: 32.6 % (ref 19.6–45.3)
MCH RBC QN AUTO: 29.7 PG (ref 26.6–33)
MCHC RBC AUTO-ENTMCNC: 33.6 G/DL (ref 31.5–35.7)
MCV RBC AUTO: 88.4 FL (ref 79–97)
MONOCYTES # BLD AUTO: 0.9 10*3/MM3 (ref 0.1–0.9)
MONOCYTES NFR BLD AUTO: 19.2 % (ref 5–12)
NEUTROPHILS # BLD AUTO: 2.1 10*3/MM3 (ref 1.7–7)
NEUTROPHILS NFR BLD AUTO: 44.1 % (ref 42.7–76)
NRBC BLD AUTO-RTO: 0.3 /100 WBC (ref 0–0.2)
PLATELET # BLD AUTO: 180 10*3/MM3 (ref 140–450)
PMV BLD AUTO: 9 FL (ref 6–12)
POTASSIUM BLD-SCNC: 4.1 MMOL/L (ref 3.5–5.2)
RBC # BLD AUTO: 3.78 10*6/MM3 (ref 3.77–5.28)
SODIUM BLD-SCNC: 143 MMOL/L (ref 136–145)
WBC NRBC COR # BLD: 4.9 10*3/MM3 (ref 3.4–10.8)

## 2019-11-21 PROCEDURE — 85652 RBC SED RATE AUTOMATED: CPT | Performed by: HOSPITALIST

## 2019-11-21 PROCEDURE — 83036 HEMOGLOBIN GLYCOSYLATED A1C: CPT | Performed by: HOSPITALIST

## 2019-11-21 PROCEDURE — 85025 COMPLETE CBC W/AUTO DIFF WBC: CPT | Performed by: NURSE PRACTITIONER

## 2019-11-21 PROCEDURE — 86140 C-REACTIVE PROTEIN: CPT | Performed by: HOSPITALIST

## 2019-11-21 PROCEDURE — 85379 FIBRIN DEGRADATION QUANT: CPT | Performed by: HOSPITALIST

## 2019-11-21 PROCEDURE — G0378 HOSPITAL OBSERVATION PER HR: HCPCS

## 2019-11-21 PROCEDURE — 96366 THER/PROPH/DIAG IV INF ADDON: CPT

## 2019-11-21 PROCEDURE — 82550 ASSAY OF CK (CPK): CPT | Performed by: HOSPITALIST

## 2019-11-21 PROCEDURE — 80048 BASIC METABOLIC PNL TOTAL CA: CPT | Performed by: NURSE PRACTITIONER

## 2019-11-21 PROCEDURE — 99217 PR OBSERVATION CARE DISCHARGE MANAGEMENT: CPT | Performed by: HOSPITALIST

## 2019-11-21 RX ORDER — DOXYCYCLINE 50 MG/1
100 CAPSULE ORAL 2 TIMES DAILY
Qty: 40 CAPSULE | Refills: 0 | Status: SHIPPED | OUTPATIENT
Start: 2019-11-21 | End: 2019-12-01

## 2019-11-21 RX ADMIN — HYDROXYCHLOROQUINE SULFATE 200 MG: 200 TABLET, FILM COATED ORAL at 08:31

## 2019-11-21 RX ADMIN — CELECOXIB 200 MG: 200 CAPSULE ORAL at 08:31

## 2019-11-21 RX ADMIN — Medication 10 ML: at 08:31

## 2019-11-21 RX ADMIN — LEVOTHYROXINE SODIUM 112 MCG: 112 TABLET ORAL at 05:53

## 2019-11-21 RX ADMIN — CLINDAMYCIN PHOSPHATE 600 MG: 600 INJECTION, SOLUTION INTRAVENOUS at 16:20

## 2019-11-21 RX ADMIN — CLINDAMYCIN PHOSPHATE 600 MG: 600 INJECTION, SOLUTION INTRAVENOUS at 10:23

## 2019-11-21 RX ADMIN — CLINDAMYCIN PHOSPHATE 600 MG: 600 INJECTION, SOLUTION INTRAVENOUS at 04:16

## 2019-11-21 NOTE — DISCHARGE SUMMARY
Date of Admission: 2019    Date of Discharge:  2019    Length of stay:  LOS: 0 days     Presenting Problem/History of Present Illness  Active Hospital Problems    Diagnosis  POA   • **Cellulitis of left lower extremity [L03.116]  Yes     Priority: High      Resolved Hospital Problems   No resolved problems to display.          Hospital Course    Chief Complaint   Patient presents with   • Cellulitis     left leg     The patient is a 58 year female with history of rheumatoid arthritis on Plaquenil that presented to the ED on 2019 with complaints of increased erythema and edema of left lateral shin. The patient denied history of diabetes, MRSA, fever, chills or or trauma to the lower extremity.  The patient only took 1 tablet of Clindamycin before coming to the emergency room.        The patient was placed on observation.  Hemoglobin A1c was checked and was 5.  D-dimer was within normal limits.  The patient will be discharged on doxycycline and needs to follow-up with her PCP within 1 week for further management.  She is advised to raise her left lower extremity while sitting to minimize edema.    Past Medical History:     Past Medical History:   Diagnosis Date   • Arthritis    • Concussion    • Fibromyalgia, primary    • Pancreatitis        Past Surgical History:     Past Surgical History:   Procedure Laterality Date   • CHOLECYSTECTOMY     • COLONOSCOPY     • ENDOMETRIAL ABLATION     • EYE SURGERY     • HYSTERECTOMY     • OOPHORECTOMY     • TONSILLECTOMY     • TUBAL ABDOMINAL LIGATION         Social History:   Social History     Socioeconomic History   • Marital status:      Spouse name: Not on file   • Number of children: Not on file   • Years of education: Not on file   • Highest education level: Not on file   Tobacco Use   • Smoking status: Former Smoker     Types: Cigarettes     Start date:      Last attempt to quit:      Years since quittin.9   • Smokeless tobacco: Never  Used   Substance and Sexual Activity   • Alcohol use: No     Frequency: Never   • Drug use: No   • Sexual activity: Not Currently     Procedures Performed: none         Vital Signs  Temp:  [97.8 °F (36.6 °C)-98.9 °F (37.2 °C)] 97.8 °F (36.6 °C)  Heart Rate:  [61-68] 64  Resp:  [16-18] 18  BP: (116-146)/(54-70) 116/70    Physical Exam:  Physical Exam   Constitutional: She is oriented to person, place, and time. She appears well-developed and well-nourished.   HENT:   Head: Normocephalic and atraumatic.   Eyes: EOM are normal. Pupils are equal, round, and reactive to light.   Neck: Normal range of motion. Neck supple.   Cardiovascular: Normal rate and normal heart sounds.   Pulmonary/Chest: Effort normal and breath sounds normal.   Abdominal: Soft. Bowel sounds are normal.   Musculoskeletal:   Left lateral shin erythema, mild edema and tenderness.   Lymphadenopathy:     She has no cervical adenopathy.   Neurological: She is alert and oriented to person, place, and time. No cranial nerve deficit or sensory deficit.   Skin: Skin is warm and dry.   Psychiatric: She has a normal mood and affect. Her speech is normal and behavior is normal. Judgment and thought content normal. Cognition and memory are normal.       Discharge Diagnosis:     Cellulitis of left lower extremity      Present on Admission:  • Cellulitis of left lower extremity      Discharge Disposition  Home or Self Care       Discharge Medications      New Medications      Instructions Start Date   doxycycline 50 MG capsule  Commonly known as:  MONODOX   100 mg, Oral, 2 Times Daily         Continue These Medications      Instructions Start Date   celecoxib 200 MG capsule  Commonly known as:  CELEBREX   200 mg, Oral, Daily      cetirizine 10 MG tablet  Commonly known as:  zyrTEC   10 mg, Oral, Daily      hydroxychloroquine 200 MG tablet  Commonly known as:  PLAQUENIL   200 mg, Oral, 2 Times Daily      levothyroxine sodium 112 MCG capsule  Commonly known as:   TIROSINT   112 mcg, Oral, Daily       VITAMIN D3 100 MCG (4000 UT) capsule  Generic drug:  Cholecalciferol   1 capsule, Oral, Every 24 Hours      XIIDRA 5 % solution  Generic drug:  Lifitegrast   1 drop, Both Eyes, 2 Times Daily             Consults:   Consults     Date and Time Order Name Status Description    11/20/2019 2019 Hospitalist (on-call MD unless specified) Completed           Pertinent Test Results:     I reviewed the patient's new clinical results.    Results from last 7 days   Lab Units 11/21/19  0303 11/20/19  1925   WBC 10*3/mm3 4.90 5.90   HEMOGLOBIN g/dL 11.2* 12.3   HEMATOCRIT % 33.4* 35.9   PLATELETS 10*3/mm3 180 197     Results from last 7 days   Lab Units 11/21/19  0303 11/20/19  1925   SODIUM mmol/L 143 144   POTASSIUM mmol/L 4.1 3.7   CHLORIDE mmol/L 108* 105   CO2 mmol/L 25.0 25.0   BUN mg/dL 14 16   CREATININE mg/dL 0.79 0.91   GLUCOSE mg/dL 93 117*   CALCIUM mg/dL 8.7 9.5     Results from last 7 days   Lab Units 11/21/19  0303 11/20/19  1925   SODIUM mmol/L 143 144   POTASSIUM mmol/L 4.1 3.7   CHLORIDE mmol/L 108* 105   CO2 mmol/L 25.0 25.0   BUN mg/dL 14 16   CREATININE mg/dL 0.79 0.91   CALCIUM mg/dL 8.7 9.5   GLUCOSE mg/dL 93 117*         Lab Results   Component Value Date    CALCIUM 8.7 11/21/2019     Hemoglobin A1C   Date Value Ref Range Status   11/21/2019 5.0 3.5 - 5.6 % Final             Microbiology Results (last 10 days)     ** No results found for the last 240 hours. **          ECG/EMG Results (most recent)     None                  Mammo Screening Digital Tomosynthesis Bilateral With Cad    Result Date: 11/14/2019  No mammographic signs of malignancy. Recommend routine mammographic screening.  BI-RADS ASSESSMENT: BI-RADS 1. Negative.  The patient's information is entered into a computerized reminder system with a targeted due date for the next mammogram.  Note:  It has been reported that there is approximately a 15% false negative in mammography.  Therefore, management of a  palpable abnormality should not be deferred because of a negative mammogram.    Electronically Signed By-Alex Yusuf DO. On:11/14/2019 3:59 PM This report was finalized on [id number] by  Alex Yusuf DO..      Condition on Discharge:    Stable    Discharge Diet: Regular    Activity at Discharge: As tolerated    Follow-up Appointments  No future appointments.  Additional Instructions for the Follow-ups that You Need to Schedule     Discharge Follow-up with PCP   As directed       Currently Documented PCP:    Kerry Mayes MD    PCP Phone Number:    [phone]     Follow Up Details:  1 week               Test Results Pending at Discharge   Order Current Status    Blood Culture - Blood, Arm, Right In process    Blood Culture - Blood, Hand, Left In process           Risk for Readmission (LACE) Score: 4 (11/21/2019  6:01 AM)      Gino Hernandez DO  11/21/19  2:38 PM

## 2019-11-21 NOTE — PLAN OF CARE
Problem: Patient Care Overview  Goal: Plan of Care Review  Outcome: Ongoing (interventions implemented as appropriate)   11/21/19 0352   Coping/Psychosocial   Plan of Care Reviewed With patient   Plan of Care Review   Progress no change   OTHER   Outcome Summary Pt complains that LLE is slightly tender, LLE is red and warm. Pt continues on IV abx     Goal: Individualization and Mutuality  Outcome: Ongoing (interventions implemented as appropriate)   11/21/19 0352   Individualization   Patient Specific Interventions IV abx, wound assessment, monitor labs and VS     Goal: Discharge Needs Assessment  Outcome: Ongoing (interventions implemented as appropriate)   11/21/19 0352   Discharge Needs Assessment   Readmission Within the Last 30 Days no previous admission in last 30 days   Concerns to be Addressed no discharge needs identified;denies needs/concerns at this time   Patient/Family Anticipates Transition to home with family   Patient/Family Anticipated Services at Transition none   Transportation Concerns car, none   Transportation Anticipated family or friend will provide   Anticipated Changes Related to Illness none   Equipment Needed After Discharge none   Disability   Equipment Currently Used at Home none       Problem: Pain, Chronic (Adult)  Goal: Identify Related Risk Factors and Signs and Symptoms  Outcome: Outcome(s) achieved Date Met: 11/21/19 11/21/19 0352   Pain, Chronic (Adult)   Related Risk Factors (Chronic Pain) disease process   Signs and Symptoms (Chronic Pain) verbalization of pain descriptors     Goal: Acceptable Pain/Comfort Level and Functional Ability  Outcome: Ongoing (interventions implemented as appropriate)   11/21/19 0352   Pain, Chronic (Adult)   Acceptable Pain/Comfort Level and Functional Ability making progress toward outcome       Problem: Skin and Soft Tissue Infection (Adult)  Goal: Signs and Symptoms of Listed Potential Problems Will be Absent, Minimized or Managed (Skin and Soft  Tissue Infection)  Outcome: Ongoing (interventions implemented as appropriate)   11/21/19 9448   Goal/Outcome Evaluation   Problems Assessed (Skin and Soft Tissue Infection) all   Problems Present (Skin/Soft Tissue Inf) infection progression;pain

## 2019-11-21 NOTE — H&P
[facility]      Patient Name: More Staton  : 1960  MRN: [id number]  Primary Care Physician: Kerry Mayes MD  Date of admission: 2019    Patient Care Team:  Kerry Mayes MD as PCP - General          Subjective   History Present Illness     Chief Complaint:   Chief Complaint   Patient presents with   • Cellulitis     left leg                History of Present Illness       58 year old obese female presents to Ed with complaints of increased erythema left lower extremity . She denies fever, chills or injury. There is a small black indentation in LLE that appears to be a bite wound . Patient denies being outdoors or known insect exposure . She was taking Po Clindamycin with no improvement and erythema has now extended down her leg to her ankle. Blood cultures were taken and she was started on IV Clindamyin in ED. She has no other complaints. She denies DM2 and other labs unremarkable. She is afebrile     Review of Systems   Constitution: Negative.   HENT: Negative.    Cardiovascular: Negative.    Respiratory: Negative.    Endocrine: Negative.    Hematologic/Lymphatic: Negative.    Skin: Positive for color change.   Musculoskeletal: Negative.    Gastrointestinal: Negative.    Genitourinary: Negative.    Neurological: Negative.    Psychiatric/Behavioral: Negative.    Allergic/Immunologic: Negative.            Personal History     Past Medical History:   Past Medical History:   Diagnosis Date   • Arthritis    • Concussion    • Fibromyalgia, primary    • Pancreatitis        Surgical History:      Past Surgical History:   Procedure Laterality Date   • CHOLECYSTECTOMY     • COLONOSCOPY     • ENDOMETRIAL ABLATION     • EYE SURGERY     • HYSTERECTOMY     • OOPHORECTOMY     • TONSILLECTOMY     • TUBAL ABDOMINAL LIGATION             Family History: family history includes Arthritis in her paternal aunt; COPD in her paternal uncle; Cancer in her  paternal aunt; Hyperlipidemia in her mother; Stroke in her daughter. Otherwise pertinent FHx was reviewed and unremarkable.     Social History:  reports that she quit smoking about 25 years ago. Her smoking use included cigarettes. She started smoking about 45 years ago. She has never used smokeless tobacco. She reports that she does not drink alcohol or use drugs.      Medications:  Prior to Admission medications    Medication Sig Start Date End Date Taking? Authorizing Provider   celecoxib (CELEBREX) 200 MG capsule Take 1 capsule by mouth Daily. 11/1/19  Yes Kerry Mayes MD   cetirizine (zyrTEC) 10 MG tablet Take 10 mg by mouth Daily. 6/12/19  Yes Mirella Bacon MD   Cholecalciferol (SM VITAMIN D3) 4000 units capsule Take 1 capsule by mouth Daily. 3/1/19  Yes Mirella Bacon MD   hydroxychloroquine (PLAQUENIL) 200 MG tablet Take 1 tablet by mouth 2 (Two) Times a Day. 8/18/19  Yes Kerry Mayes MD   levothyroxine sodium (TIROSINT) 112 MCG capsule Take 112 mcg by mouth Daily. 5/20/19  Yes Mirella Bacon MD   XIIDRA 5 % solution Administer 1 drop to both eyes 2 (Two) Times a Day. 7/22/19  Yes Mirella Bacon MD   benzonatate (TESSALON) 200 MG capsule  6/11/19 11/20/19  Mirella Bacon MD   DULoxetine (CYMBALTA) 60 MG capsule Take 1 capsule by mouth Daily. 8/14/19 11/20/19  Kerry Mayes MD       Allergies:    Allergies   Allergen Reactions   • Codeine Anaphylaxis   • Hydrocodone-Acetaminophen Hives   • Latex Hives   • Penicillins Anaphylaxis, Hives and Shortness Of Breath   • Sulfa Antibiotics Hives   • Sulfur Hives and Itching   • Tramadol Hives       Objective   Objective     Vital Signs  Temp:  [97.8 °F (36.6 °C)-97.9 °F (36.6 °C)] 97.9 °F (36.6 °C)  Heart Rate:  [64-68] 64  Resp:  [17] 17  BP: (129-142)/(54-61) 129/54  SpO2:  [97 %-100 %] 97 %  on   ;   Device (Oxygen Therapy): room air  Body mass index is 39.02 kg/m².    Physical Exam   Constitutional: She  is oriented to person, place, and time. She appears well-developed and well-nourished.   HENT:   Head: Atraumatic.   Eyes: EOM are normal.   Neck: Normal range of motion. Neck supple.   Cardiovascular: Normal rate, regular rhythm and normal heart sounds.   Pulmonary/Chest: Effort normal and breath sounds normal.   Abdominal: Bowel sounds are normal.   Musculoskeletal: Normal range of motion.   Neurological: She is alert and oriented to person, place, and time.   Skin: Skin is warm. There is erythema.   Psychiatric: She has a normal mood and affect. Her behavior is normal. Judgment and thought content normal.   Vitals reviewed.      Results Review:  I have personally reviewed most recent lab results and agree with findings, most notably: wbc .    Results from last 7 days   Lab Units 11/20/19  1925   WBC 10*3/mm3 5.90   HEMOGLOBIN g/dL 12.3   HEMATOCRIT % 35.9   PLATELETS 10*3/mm3 197     Results from last 7 days   Lab Units 11/20/19  1925   SODIUM mmol/L 144   POTASSIUM mmol/L 3.7   CHLORIDE mmol/L 105   CO2 mmol/L 25.0   BUN mg/dL 16   CREATININE mg/dL 0.91   GLUCOSE mg/dL 117*   CALCIUM mg/dL 9.5     Estimated Creatinine Clearance: 86.4 mL/min (by C-G formula based on SCr of 0.91 mg/dL).  Brief Urine Lab Results     None        Imaging Results (Last 24 Hours)     ** No results found for the last 24 hours. **            Microbiology Results (last 10 days)     ** No results found for the last 240 hours. **          ECG/EMG Results (most recent)     None                    Mammo Screening Digital Tomosynthesis Bilateral With Cad    Result Date: 11/14/2019  No mammographic signs of malignancy. Recommend routine mammographic screening.  BI-RADS ASSESSMENT: BI-RADS 1. Negative.  The patient's information is entered into a computerized reminder system with a targeted due date for the next mammogram.  Note:  It has been reported that there is approximately a 15% false negative in mammography.  Therefore, management of a  palpable abnormality should not be deferred because of a negative mammogram.    Electronically Signed By-Alex Yusuf DO. On:11/14/2019 3:59 PM This report was finalized on [id number] by  Alex Yusuf DO..        Estimated Creatinine Clearance: 86.4 mL/min (by C-G formula based on SCr of 0.91 mg/dL).    Assessment/Plan   Assessment/Plan       Active Hospital Problems:  No notes have been filed under this hospital service.  Service: Hospitalist    Active Problems updated but not populating to Note- IT contacted .      Cellulitis LLE- continue IV Clindamycin with blood culture pending, consider X Ray of LLE if  no improvement       Hypothyroidism - on Synthroid    RA- on Plaquenil and Celebrex    Seasonal allergy - on Cetirizine       VTE Prophylaxis - SCDs.    CODE STATUS:    Code Status and Medical Interventions:   Ordered at: 11/20/19 2223     Code Status:    CPR     Medical Interventions (Level of Support Prior to Arrest):    Full       Admission Status:  I believe this patient meets inpatient  criteria.      I discussed the patients findings and my recommendations with patient and  .        Electronically signed by NICHOLE Swain, 11/20/19, 10:23 PM.  [facility]ist Team

## 2019-11-22 ENCOUNTER — TELEPHONE (OUTPATIENT)
Dept: FAMILY MEDICINE CLINIC | Facility: CLINIC | Age: 59
End: 2019-11-22

## 2019-11-22 NOTE — TELEPHONE ENCOUNTER
Patient called to schedule hospital F/U. She was hospitalized overnight for cellulitis and was instructed to F/U with CMM next week. Please call and schedule. Thank you.

## 2019-11-22 NOTE — PROGRESS NOTES
Case Management Discharge Note                     Final Discharge Disposition Code: 01 - home or self-care

## 2019-11-25 ENCOUNTER — TRANSITIONAL CARE MANAGEMENT TELEPHONE ENCOUNTER (OUTPATIENT)
Dept: FAMILY MEDICINE CLINIC | Facility: CLINIC | Age: 59
End: 2019-11-25

## 2019-11-25 LAB
BACTERIA SPEC AEROBE CULT: NORMAL
BACTERIA SPEC AEROBE CULT: NORMAL

## 2019-11-25 RX ORDER — DICLOFENAC SODIUM 75 MG/1
TABLET, DELAYED RELEASE ORAL
Qty: 180 TABLET | Refills: 0 | OUTPATIENT
Start: 2019-11-25

## 2019-11-25 NOTE — TELEPHONE ENCOUNTER
Patient wasn't aware she couldn't take both. She p/u the Celebrex and will take it only from now on.

## 2019-11-25 NOTE — TELEPHONE ENCOUNTER
Patient called back and asked to be called with hospital F/U appointment.    Patient is also needing her Diclofenac Sodium 75mg RX sent to pharmacy. Originally RX'd by Dr. Frey.     Thank you.

## 2019-12-04 ENCOUNTER — OFFICE VISIT (OUTPATIENT)
Dept: FAMILY MEDICINE CLINIC | Facility: CLINIC | Age: 59
End: 2019-12-04

## 2019-12-04 VITALS
HEIGHT: 67 IN | OXYGEN SATURATION: 98 % | TEMPERATURE: 98.1 F | HEART RATE: 60 BPM | SYSTOLIC BLOOD PRESSURE: 142 MMHG | DIASTOLIC BLOOD PRESSURE: 74 MMHG | WEIGHT: 248.6 LBS | RESPIRATION RATE: 16 BRPM | BODY MASS INDEX: 39.02 KG/M2

## 2019-12-04 DIAGNOSIS — R07.9 CHEST PAIN AT REST: ICD-10-CM

## 2019-12-04 DIAGNOSIS — IMO0001 TRANSITION OF CARE PERFORMED WITH SHARING OF CLINICAL SUMMARY: Primary | ICD-10-CM

## 2019-12-04 PROBLEM — Z78.9 TRANSITION OF CARE PERFORMED WITH SHARING OF CLINICAL SUMMARY: Status: ACTIVE | Noted: 2019-12-04

## 2019-12-04 PROCEDURE — 93000 ELECTROCARDIOGRAM COMPLETE: CPT | Performed by: INTERNAL MEDICINE

## 2019-12-04 PROCEDURE — 99495 TRANSJ CARE MGMT MOD F2F 14D: CPT | Performed by: INTERNAL MEDICINE

## 2019-12-04 RX ORDER — VITAMIN B COMPLEX
1 CAPSULE ORAL DAILY
COMMUNITY
End: 2021-12-30

## 2019-12-04 NOTE — PROGRESS NOTES
"Rooming Tab(CC,VS,Pt Hx,Fall Screen)  Chief Complaint   Patient presents with   • Transitional Care Management       Subjective   Pt here for TCM- had left leg cellulitis-  Was admitted for IV abx and then to home with doxycycline- and doing well except the stools are still low.  Having 4-5 still daily.  Joints tender at times- but not seeing rheum at the moment- toget new MD   also with mid sternal chest pain- not awakening her- not with exercise  Unsure if stress or not.    just had   I have reviewed and updated her medications, medical history and problem list during today's office visit.     Patient Care Team:  Kerry Mayes MD as PCP - General    Problem List Tab  Medications Tab  Synopsis Tab  Chart Review Tab  Care Everywhere Tab  Immunizations Tab  Patient History Tab    Social History     Tobacco Use   • Smoking status: Former Smoker     Packs/day: 1.00     Years: 15.00     Pack years: 15.00     Types: Cigarettes     Start date:      Last attempt to quit:      Years since quittin.5   • Smokeless tobacco: Never Used   • Tobacco comment: Off and on use amount varies   Substance Use Topics   • Alcohol use: No     Frequency: Never       Review of Systems    Objective     Rooming Tab(CC,VS,Pt Hx,Fall Screen)  /74 (BP Location: Left arm, Patient Position: Sitting, Cuff Size: Adult)   Pulse 60   Temp 98.1 °F (36.7 °C) (Oral)   Resp 16   Ht 170.2 cm (67\")   Wt 113 kg (248 lb 9.6 oz)   SpO2 98%   BMI 38.94 kg/m²     Body mass index is 38.94 kg/m².    Physical Exam   Constitutional: She is oriented to person, place, and time. She appears well-developed and well-nourished.   HENT:   Head: Normocephalic and atraumatic.   Right Ear: Tympanic membrane normal.   Left Ear: Tympanic membrane normal.   Eyes: Pupils are equal, round, and reactive to light.   Neck: Normal range of motion. Neck supple.   Cardiovascular: Normal rate and regular rhythm.   No murmur heard.  Pulmonary/Chest: " Effort normal and breath sounds normal.   Abdominal: Soft. Bowel sounds are normal. She exhibits no distension.   Neurological: She is oriented to person, place, and time.   Skin: Capillary refill takes less than 2 seconds.   Resolved cellulitis- still with small scab   Nursing note and vitals reviewed.       Statin Choice Calculator  Data Reviewed:    Mammo Screening Digital Tomosynthesis Bilateral With Cad    Result Date: 11/14/2019  Impression: No mammographic signs of malignancy. Recommend routine mammographic screening.  BI-RADS ASSESSMENT: BI-RADS 1. Negative.  The patient's information is entered into a computerized reminder system with a targeted due date for the next mammogram.  Note:  It has been reported that there is approximately a 15% false negative in mammography.  Therefore, management of a palpable abnormality should not be deferred because of a negative mammogram.    Electronically Signed By-Alex Yusuf DO. On:11/14/2019 3:59 PM This report was finalized on [id number] by  Alex Yusuf DO..      ECG 12 Lead  Date/Time: 12/4/2019 1:41 PM  Performed by: Kerry Mayes MD  Authorized by: Kerry Mayes MD                     Lab Results   Component Value Date    BUN 14 11/21/2019    CREATININE 0.79 11/21/2019    EGFRIFNONA 74 11/21/2019     11/21/2019    K 4.1 11/21/2019     (H) 11/21/2019    CALCIUM 8.7 11/21/2019    CKTOTAL 178 11/21/2019    WBC 4.90 11/21/2019    RBC 3.78 11/21/2019    HCT 33.4 (L) 11/21/2019    MCV 88.4 11/21/2019    MCH 29.7 11/21/2019      Assessment/Plan   Order Review Tab  Health Maintenance Tab  Patient Plan/Order Tab  Diagnoses and all orders for this visit:    1. Transition of care performed with sharing of clinical summary (Primary)  Comments:  doing well and all resolved    2. Chest pain at rest  Comments:  EKG stable- pain most likely from stress  Orders:  -     ECG 12 Lead        Wrapup Tab  Return in about 6 months (around 6/4/2020)  for Recheck.         Transitional Care Management Certification  I certify that the following are true:  1. Communication was made within 2 business days of discharge.  2. Complexity of Medical Decision Making is moderate.  3. Face to face visit occurred within 14 days.    *Note: 71952 is for high complexity patients with a face to face visit within 7 days of discharge.  00947 is for high complexity patients with a face to face on days 8-14 post discharge or medium complexity with face to face visit within 14 days post discharge.

## 2019-12-06 ENCOUNTER — TELEPHONE (OUTPATIENT)
Dept: FAMILY MEDICINE CLINIC | Facility: CLINIC | Age: 59
End: 2019-12-06

## 2019-12-06 ENCOUNTER — HOSPITAL ENCOUNTER (OUTPATIENT)
Dept: BONE DENSITY | Facility: HOSPITAL | Age: 59
Discharge: HOME | End: 2019-12-06
Admitting: NURSE PRACTITIONER

## 2019-12-06 DIAGNOSIS — M85.88 OTHER SPECIFIED DISORDERS OF BONE DENSITY AND STRUCTURE, OTHER SITE: ICD-10-CM

## 2019-12-06 PROCEDURE — 77080 DXA BONE DENSITY AXIAL: CPT

## 2019-12-06 RX ORDER — METRONIDAZOLE 250 MG/1
250 TABLET ORAL 3 TIMES DAILY
Qty: 30 TABLET | Refills: 0 | Status: SHIPPED | OUTPATIENT
Start: 2019-12-06 | End: 2020-09-18

## 2019-12-06 NOTE — TELEPHONE ENCOUNTER
PATIENT SAID THAT YOU HAD SEEN HER ON 12/04 FOR CELLULITIS AND THAT IT HAS GOTTEN WORSE. SHE SAID THAT YOU HAD MENTIONED IF IT DIDN'T GET ANY BETTER THAT YOU WOULD SEND A PRESCRIPTION INTO HER PHARMACY.PLEASE ADVISE

## 2019-12-06 NOTE — TELEPHONE ENCOUNTER
called pt and actually it is the diarrhea that is worse and having accidents- not the cellulitis- ( that is completely gone)   sent in flagyl

## 2020-04-21 RX ORDER — CETIRIZINE HYDROCHLORIDE 10 MG/1
TABLET ORAL
Qty: 90 TABLET | Refills: 1 | Status: SHIPPED | OUTPATIENT
Start: 2020-04-21 | End: 2020-12-07

## 2020-11-10 ENCOUNTER — TRANSCRIBE ORDERS (OUTPATIENT)
Dept: ADMINISTRATIVE | Facility: HOSPITAL | Age: 60
End: 2020-11-10

## 2020-11-10 DIAGNOSIS — Z12.31 SCREENING MAMMOGRAM, ENCOUNTER FOR: Primary | ICD-10-CM

## 2020-11-13 PROCEDURE — U0003 INFECTIOUS AGENT DETECTION BY NUCLEIC ACID (DNA OR RNA); SEVERE ACUTE RESPIRATORY SYNDROME CORONAVIRUS 2 (SARS-COV-2) (CORONAVIRUS DISEASE [COVID-19]), AMPLIFIED PROBE TECHNIQUE, MAKING USE OF HIGH THROUGHPUT TECHNOLOGIES AS DESCRIBED BY CMS-2020-01-R: HCPCS | Performed by: NURSE PRACTITIONER

## 2020-11-16 ENCOUNTER — PATIENT MESSAGE (OUTPATIENT)
Dept: FAMILY MEDICINE CLINIC | Facility: CLINIC | Age: 60
End: 2020-11-16

## 2020-11-16 RX ORDER — AZITHROMYCIN 250 MG/1
TABLET, FILM COATED ORAL
Qty: 6 TABLET | Refills: 0 | Status: SHIPPED | OUTPATIENT
Start: 2020-11-16 | End: 2021-11-30

## 2020-11-17 NOTE — TELEPHONE ENCOUNTER
From: More Staton  To: Kerry Mayes MD  Sent: 11/16/2020 8:28 AM EST  Subject: Non-Urgent Medical Question    I went to urgent care Friday I have a severe sinus infection they gave me an antibiotic that makes me throw everything up they have different providers daily so they won’t give me a different antibiotic doxycycline hyclate 100mg capsules is the antibiotic will you send me something else I quit taking that since I haven’t been able to eat since Thursday. Thanks More Staton

## 2020-11-25 ENCOUNTER — HOSPITAL ENCOUNTER (OUTPATIENT)
Dept: MAMMOGRAPHY | Facility: HOSPITAL | Age: 60
Discharge: HOME | End: 2020-11-25
Admitting: NURSE PRACTITIONER

## 2020-11-25 DIAGNOSIS — Z12.31 SCREENING MAMMOGRAM, ENCOUNTER FOR: ICD-10-CM

## 2020-11-25 PROCEDURE — 77063 BREAST TOMOSYNTHESIS BI: CPT

## 2020-11-25 PROCEDURE — 77067 SCR MAMMO BI INCL CAD: CPT

## 2020-12-07 RX ORDER — CETIRIZINE HYDROCHLORIDE 10 MG/1
TABLET ORAL
Qty: 90 TABLET | Refills: 3 | Status: SHIPPED | OUTPATIENT
Start: 2020-12-07

## 2021-03-23 ENCOUNTER — LAB (OUTPATIENT)
Dept: LAB | Facility: HOSPITAL | Age: 61
End: 2021-03-23

## 2021-03-23 ENCOUNTER — HOSPITAL ENCOUNTER (OUTPATIENT)
Dept: GENERAL RADIOLOGY | Facility: HOSPITAL | Age: 61
Discharge: HOME | End: 2021-03-23

## 2021-03-23 ENCOUNTER — HOSPITAL ENCOUNTER (OUTPATIENT)
Dept: CARDIOLOGY | Facility: HOSPITAL | Age: 61
Discharge: HOME | End: 2021-03-23

## 2021-03-23 LAB
ABO GROUP BLD: NORMAL
ANION GAP SERPL CALCULATED.3IONS-SCNC: 8.1 MMOL/L (ref 5–15)
APTT PPP: 30.3 SECONDS (ref 24–31)
BASOPHILS # BLD AUTO: 0.05 10*3/MM3 (ref 0–0.2)
BASOPHILS NFR BLD AUTO: 0.9 % (ref 0–1.5)
BILIRUB UR QL STRIP: NEGATIVE
BLD GP AB SCN SERPL QL: NEGATIVE
BUN SERPL-MCNC: 16 MG/DL (ref 8–23)
BUN/CREAT SERPL: 17.6 (ref 7–25)
CALCIUM SPEC-SCNC: 9.4 MG/DL (ref 8.6–10.5)
CHLORIDE SERPL-SCNC: 104 MMOL/L (ref 98–107)
CLARITY UR: CLEAR
CO2 SERPL-SCNC: 28.9 MMOL/L (ref 22–29)
COLOR UR: YELLOW
CREAT SERPL-MCNC: 0.91 MG/DL (ref 0.57–1)
DEPRECATED RDW RBC AUTO: 41.9 FL (ref 37–54)
EOSINOPHIL # BLD AUTO: 0.12 10*3/MM3 (ref 0–0.4)
EOSINOPHIL NFR BLD AUTO: 2.1 % (ref 0.3–6.2)
ERYTHROCYTE [DISTWIDTH] IN BLOOD BY AUTOMATED COUNT: 13.1 % (ref 12.3–15.4)
GFR SERPL CREATININE-BSD FRML MDRD: 63 ML/MIN/1.73
GLUCOSE SERPL-MCNC: 78 MG/DL (ref 65–99)
GLUCOSE UR STRIP-MCNC: NEGATIVE MG/DL
HCT VFR BLD AUTO: 41.5 % (ref 34–46.6)
HGB BLD-MCNC: 13.5 G/DL (ref 12–15.9)
HGB UR QL STRIP.AUTO: NEGATIVE
IMM GRANULOCYTES # BLD AUTO: 0.01 10*3/MM3 (ref 0–0.05)
IMM GRANULOCYTES NFR BLD AUTO: 0.2 % (ref 0–0.5)
INR PPP: 1.1 (ref 0.93–1.1)
KETONES UR QL STRIP: NEGATIVE
LEUKOCYTE ESTERASE UR QL STRIP.AUTO: NEGATIVE
LYMPHOCYTES # BLD AUTO: 2 10*3/MM3 (ref 0.7–3.1)
LYMPHOCYTES NFR BLD AUTO: 34.7 % (ref 19.6–45.3)
MCH RBC QN AUTO: 28.4 PG (ref 26.6–33)
MCHC RBC AUTO-ENTMCNC: 32.5 G/DL (ref 31.5–35.7)
MCV RBC AUTO: 87.4 FL (ref 79–97)
MONOCYTES # BLD AUTO: 0.68 10*3/MM3 (ref 0.1–0.9)
MONOCYTES NFR BLD AUTO: 11.8 % (ref 5–12)
MRSA DNA SPEC QL NAA+PROBE: NORMAL
NEUTROPHILS NFR BLD AUTO: 2.9 10*3/MM3 (ref 1.7–7)
NEUTROPHILS NFR BLD AUTO: 50.3 % (ref 42.7–76)
NITRITE UR QL STRIP: NEGATIVE
NRBC BLD AUTO-RTO: 0 /100 WBC (ref 0–0.2)
PH UR STRIP.AUTO: 7 [PH] (ref 5–8)
PLATELET # BLD AUTO: 315 10*3/MM3 (ref 140–450)
PMV BLD AUTO: 11.3 FL (ref 6–12)
POTASSIUM SERPL-SCNC: 4.3 MMOL/L (ref 3.5–5.2)
PROT UR QL STRIP: NEGATIVE
PROTHROMBIN TIME: 12 SECONDS (ref 9.6–11.7)
RBC # BLD AUTO: 4.75 10*6/MM3 (ref 3.77–5.28)
RH BLD: POSITIVE
SODIUM SERPL-SCNC: 141 MMOL/L (ref 136–145)
SP GR UR STRIP: <1.005 (ref 1–1.03)
T&S EXPIRATION DATE: NORMAL
UROBILINOGEN UR QL STRIP: ABNORMAL
WBC # BLD AUTO: 5.76 10*3/MM3 (ref 3.4–10.8)

## 2021-03-23 PROCEDURE — 71046 X-RAY EXAM CHEST 2 VIEWS: CPT

## 2021-03-23 PROCEDURE — 85610 PROTHROMBIN TIME: CPT

## 2021-03-23 PROCEDURE — 86901 BLOOD TYPING SEROLOGIC RH(D): CPT

## 2021-03-23 PROCEDURE — 85025 COMPLETE CBC W/AUTO DIFF WBC: CPT

## 2021-03-23 PROCEDURE — 86900 BLOOD TYPING SEROLOGIC ABO: CPT

## 2021-03-23 PROCEDURE — 86850 RBC ANTIBODY SCREEN: CPT

## 2021-03-23 PROCEDURE — 93010 ELECTROCARDIOGRAM REPORT: CPT | Performed by: INTERNAL MEDICINE

## 2021-03-23 PROCEDURE — 80048 BASIC METABOLIC PNL TOTAL CA: CPT

## 2021-03-23 PROCEDURE — 93005 ELECTROCARDIOGRAM TRACING: CPT | Performed by: ORTHOPAEDIC SURGERY

## 2021-03-23 PROCEDURE — 85730 THROMBOPLASTIN TIME PARTIAL: CPT

## 2021-03-23 PROCEDURE — 81003 URINALYSIS AUTO W/O SCOPE: CPT

## 2021-03-23 PROCEDURE — 87641 MR-STAPH DNA AMP PROBE: CPT

## 2021-03-25 LAB — QT INTERVAL: 445 MS

## 2021-03-31 ENCOUNTER — LAB (OUTPATIENT)
Dept: LAB | Facility: HOSPITAL | Age: 61
End: 2021-03-31

## 2021-03-31 LAB — SARS-COV-2 ORF1AB RESP QL NAA+PROBE: NOT DETECTED

## 2021-03-31 PROCEDURE — U0004 COV-19 TEST NON-CDC HGH THRU: HCPCS

## 2021-03-31 PROCEDURE — C9803 HOPD COVID-19 SPEC COLLECT: HCPCS

## 2021-04-01 ENCOUNTER — ANESTHESIA EVENT (OUTPATIENT)
Dept: PERIOP | Facility: HOSPITAL | Age: 61
End: 2021-04-01

## 2021-04-01 ASSESSMENT — KOOS JR
KOOS JR SCORE: 17
KOOS JR SCORE: 44.905

## 2021-04-02 ENCOUNTER — HOSPITAL ENCOUNTER (OUTPATIENT)
Facility: HOSPITAL | Age: 61
Discharge: HOME | End: 2021-04-03
Attending: ORTHOPAEDIC SURGERY | Admitting: ORTHOPAEDIC SURGERY

## 2021-04-02 ENCOUNTER — ANESTHESIA (OUTPATIENT)
Dept: PERIOP | Facility: HOSPITAL | Age: 61
End: 2021-04-02

## 2021-04-02 ENCOUNTER — APPOINTMENT (OUTPATIENT)
Dept: GENERAL RADIOLOGY | Facility: HOSPITAL | Age: 61
End: 2021-04-02

## 2021-04-02 DIAGNOSIS — Z96.651 STATUS POST TOTAL RIGHT KNEE REPLACEMENT: Primary | ICD-10-CM

## 2021-04-02 PROBLEM — Z96.659 TOTAL KNEE REPLACEMENT STATUS: Status: ACTIVE | Noted: 2021-04-02

## 2021-04-02 PROCEDURE — 25010000002 MIDAZOLAM PER 1 MG: Performed by: ANESTHESIOLOGY

## 2021-04-02 PROCEDURE — 25010000002 FENTANYL CITRATE (PF) 100 MCG/2ML SOLUTION: Performed by: ANESTHESIOLOGY

## 2021-04-02 PROCEDURE — C1889 IMPLANT/INSERT DEVICE, NOC: HCPCS | Performed by: ORTHOPAEDIC SURGERY

## 2021-04-02 PROCEDURE — 25010000002 FENTANYL CITRATE (PF) 100 MCG/2ML SOLUTION: Performed by: ANESTHESIOLOGIST ASSISTANT

## 2021-04-02 PROCEDURE — 63710000001 ONDANSETRON PER 8 MG: Performed by: ORTHOPAEDIC SURGERY

## 2021-04-02 PROCEDURE — 73560 X-RAY EXAM OF KNEE 1 OR 2: CPT

## 2021-04-02 PROCEDURE — G0378 HOSPITAL OBSERVATION PER HR: HCPCS

## 2021-04-02 PROCEDURE — C1713 ANCHOR/SCREW BN/BN,TIS/BN: HCPCS | Performed by: ORTHOPAEDIC SURGERY

## 2021-04-02 PROCEDURE — 25010000002 DIPHENHYDRAMINE PER 50 MG: Performed by: ANESTHESIOLOGIST ASSISTANT

## 2021-04-02 PROCEDURE — C1776 JOINT DEVICE (IMPLANTABLE): HCPCS | Performed by: ORTHOPAEDIC SURGERY

## 2021-04-02 PROCEDURE — 25010000002 ROPIVACAINE PER 1 MG: Performed by: ANESTHESIOLOGY

## 2021-04-02 PROCEDURE — 25010000002 DEXAMETHASONE PER 1 MG: Performed by: ANESTHESIOLOGY

## 2021-04-02 PROCEDURE — 25010000002 VANCOMYCIN 10 G RECONSTITUTED SOLUTION: Performed by: ORTHOPAEDIC SURGERY

## 2021-04-02 PROCEDURE — 25010000002 DEXAMETHASONE PER 1 MG: Performed by: ANESTHESIOLOGIST ASSISTANT

## 2021-04-02 PROCEDURE — 76942 ECHO GUIDE FOR BIOPSY: CPT | Performed by: ORTHOPAEDIC SURGERY

## 2021-04-02 PROCEDURE — 25010000002 PROPOFOL 10 MG/ML EMULSION: Performed by: ANESTHESIOLOGIST ASSISTANT

## 2021-04-02 PROCEDURE — 25010000002 HYDRALAZINE PER 20 MG: Performed by: ANESTHESIOLOGIST ASSISTANT

## 2021-04-02 PROCEDURE — 97162 PT EVAL MOD COMPLEX 30 MIN: CPT

## 2021-04-02 DEVICE — DEV CONTRL TISS STRATAFIX PDS PLS SZ1 VIL 18IN 45 CM: Type: IMPLANTABLE DEVICE | Site: KNEE | Status: FUNCTIONAL

## 2021-04-02 DEVICE — IMPLANTABLE DEVICE: Type: IMPLANTABLE DEVICE | Site: KNEE | Status: FUNCTIONAL

## 2021-04-02 DEVICE — JOURNEY II CR INSERT XLPE RIGHT SIZE 3-4 9MM
Type: IMPLANTABLE DEVICE | Site: KNEE | Status: FUNCTIONAL
Brand: JOURNEY

## 2021-04-02 DEVICE — DEV CONTRL TISS STRATAFIX SPIRAL PDS PLS CT1 2-0 1/2 30CM: Type: IMPLANTABLE DEVICE | Site: KNEE | Status: FUNCTIONAL

## 2021-04-02 DEVICE — CMT BONE PALACOS R HI/VISC 1X40: Type: IMPLANTABLE DEVICE | Site: KNEE | Status: FUNCTIONAL

## 2021-04-02 DEVICE — DEV CONTRL TISS STRATAFIX SPIRAL PLS PDS SH 2/0 30CM: Type: IMPLANTABLE DEVICE | Site: KNEE | Status: FUNCTIONAL

## 2021-04-02 DEVICE — JOURNEY 7.5 ROUND RESURF PAT 32MM STANDARD
Type: IMPLANTABLE DEVICE | Site: KNEE | Status: FUNCTIONAL
Brand: JOURNEY

## 2021-04-02 DEVICE — JOURNEY TIBIAL BASEPLATE NONPOROUS                                    RIGHT SIZE 4
Type: IMPLANTABLE DEVICE | Site: KNEE | Status: FUNCTIONAL
Brand: JOURNEY

## 2021-04-02 DEVICE — JOURNEY II CR FEMORAL OXINIUM                                    NONPOROUS RIGHT SIZE 5
Type: IMPLANTABLE DEVICE | Site: KNEE | Status: FUNCTIONAL
Brand: JOURNEY

## 2021-04-02 RX ORDER — CELECOXIB 200 MG/1
400 CAPSULE ORAL ONCE
Status: COMPLETED | OUTPATIENT
Start: 2021-04-02 | End: 2021-04-02

## 2021-04-02 RX ORDER — ONDANSETRON 2 MG/ML
4 INJECTION INTRAMUSCULAR; INTRAVENOUS EVERY 6 HOURS PRN
Status: DISCONTINUED | OUTPATIENT
Start: 2021-04-02 | End: 2021-04-03 | Stop reason: HOSPADM

## 2021-04-02 RX ORDER — HYDRALAZINE HYDROCHLORIDE 20 MG/ML
INJECTION INTRAMUSCULAR; INTRAVENOUS AS NEEDED
Status: DISCONTINUED | OUTPATIENT
Start: 2021-04-02 | End: 2021-04-02 | Stop reason: SURG

## 2021-04-02 RX ORDER — OXYCODONE HYDROCHLORIDE 5 MG/1
10 TABLET ORAL EVERY 4 HOURS PRN
Status: DISCONTINUED | OUTPATIENT
Start: 2021-04-02 | End: 2021-04-02

## 2021-04-02 RX ORDER — SODIUM CHLORIDE 0.9 % (FLUSH) 0.9 %
10 SYRINGE (ML) INJECTION AS NEEDED
Status: DISCONTINUED | OUTPATIENT
Start: 2021-04-02 | End: 2021-04-02 | Stop reason: HOSPADM

## 2021-04-02 RX ORDER — DEXAMETHASONE SODIUM PHOSPHATE 4 MG/ML
INJECTION, SOLUTION INTRA-ARTICULAR; INTRALESIONAL; INTRAMUSCULAR; INTRAVENOUS; SOFT TISSUE AS NEEDED
Status: DISCONTINUED | OUTPATIENT
Start: 2021-04-02 | End: 2021-04-02 | Stop reason: SURG

## 2021-04-02 RX ORDER — HYDRALAZINE HYDROCHLORIDE 20 MG/ML
5 INJECTION INTRAMUSCULAR; INTRAVENOUS
Status: DISCONTINUED | OUTPATIENT
Start: 2021-04-02 | End: 2021-04-02 | Stop reason: HOSPADM

## 2021-04-02 RX ORDER — ROPIVACAINE HYDROCHLORIDE 5 MG/ML
INJECTION, SOLUTION EPIDURAL; INFILTRATION; PERINEURAL
Status: COMPLETED | OUTPATIENT
Start: 2021-04-02 | End: 2021-04-02

## 2021-04-02 RX ORDER — DEXAMETHASONE SODIUM PHOSPHATE 4 MG/ML
INJECTION, SOLUTION INTRA-ARTICULAR; INTRALESIONAL; INTRAMUSCULAR; INTRAVENOUS; SOFT TISSUE
Status: COMPLETED | OUTPATIENT
Start: 2021-04-02 | End: 2021-04-02

## 2021-04-02 RX ORDER — FLUMAZENIL 0.1 MG/ML
0.2 INJECTION INTRAVENOUS AS NEEDED
Status: DISCONTINUED | OUTPATIENT
Start: 2021-04-02 | End: 2021-04-02 | Stop reason: HOSPADM

## 2021-04-02 RX ORDER — NALOXONE HCL 0.4 MG/ML
0.1 VIAL (ML) INJECTION
Status: DISCONTINUED | OUTPATIENT
Start: 2021-04-02 | End: 2021-04-03 | Stop reason: HOSPADM

## 2021-04-02 RX ORDER — DROPERIDOL 2.5 MG/ML
0.62 INJECTION, SOLUTION INTRAMUSCULAR; INTRAVENOUS ONCE AS NEEDED
Status: DISCONTINUED | OUTPATIENT
Start: 2021-04-02 | End: 2021-04-02 | Stop reason: HOSPADM

## 2021-04-02 RX ORDER — FENTANYL CITRATE 50 UG/ML
INJECTION, SOLUTION INTRAMUSCULAR; INTRAVENOUS
Status: COMPLETED | OUTPATIENT
Start: 2021-04-02 | End: 2021-04-02

## 2021-04-02 RX ORDER — HYDROMORPHONE HCL 110MG/55ML
1 PATIENT CONTROLLED ANALGESIA SYRINGE INTRAVENOUS EVERY 4 HOURS PRN
Status: DISCONTINUED | OUTPATIENT
Start: 2021-04-02 | End: 2021-04-03 | Stop reason: HOSPADM

## 2021-04-02 RX ORDER — DROPERIDOL 2.5 MG/ML
1.25 INJECTION, SOLUTION INTRAMUSCULAR; INTRAVENOUS ONCE AS NEEDED
Status: DISCONTINUED | OUTPATIENT
Start: 2021-04-02 | End: 2021-04-02 | Stop reason: HOSPADM

## 2021-04-02 RX ORDER — MELOXICAM 15 MG/1
15 TABLET ORAL DAILY
Status: DISCONTINUED | OUTPATIENT
Start: 2021-04-03 | End: 2021-04-03 | Stop reason: HOSPADM

## 2021-04-02 RX ORDER — BUPIVACAINE HYDROCHLORIDE 2.5 MG/ML
INJECTION, SOLUTION EPIDURAL; INFILTRATION; INTRACAUDAL AS NEEDED
Status: DISCONTINUED | OUTPATIENT
Start: 2021-04-02 | End: 2021-04-02 | Stop reason: HOSPADM

## 2021-04-02 RX ORDER — TRAMADOL HYDROCHLORIDE 50 MG/1
50 TABLET ORAL EVERY 4 HOURS PRN
Status: DISCONTINUED | OUTPATIENT
Start: 2021-04-02 | End: 2021-04-03 | Stop reason: HOSPADM

## 2021-04-02 RX ORDER — EPHEDRINE SULFATE 50 MG/ML
5 INJECTION, SOLUTION INTRAVENOUS ONCE AS NEEDED
Status: DISCONTINUED | OUTPATIENT
Start: 2021-04-02 | End: 2021-04-02 | Stop reason: HOSPADM

## 2021-04-02 RX ORDER — SODIUM CHLORIDE, SODIUM LACTATE, POTASSIUM CHLORIDE, CALCIUM CHLORIDE 600; 310; 30; 20 MG/100ML; MG/100ML; MG/100ML; MG/100ML
9 INJECTION, SOLUTION INTRAVENOUS CONTINUOUS PRN
Status: DISCONTINUED | OUTPATIENT
Start: 2021-04-02 | End: 2021-04-02 | Stop reason: HOSPADM

## 2021-04-02 RX ORDER — PROPOFOL 10 MG/ML
VIAL (ML) INTRAVENOUS AS NEEDED
Status: DISCONTINUED | OUTPATIENT
Start: 2021-04-02 | End: 2021-04-02 | Stop reason: SURG

## 2021-04-02 RX ORDER — SODIUM CHLORIDE 9 MG/ML
100 INJECTION, SOLUTION INTRAVENOUS CONTINUOUS
Status: DISCONTINUED | OUTPATIENT
Start: 2021-04-02 | End: 2021-04-03 | Stop reason: HOSPADM

## 2021-04-02 RX ORDER — ONDANSETRON 4 MG/1
4 TABLET, FILM COATED ORAL EVERY 6 HOURS PRN
Status: DISCONTINUED | OUTPATIENT
Start: 2021-04-02 | End: 2021-04-03 | Stop reason: HOSPADM

## 2021-04-02 RX ORDER — ONDANSETRON 2 MG/ML
4 INJECTION INTRAMUSCULAR; INTRAVENOUS ONCE AS NEEDED
Status: DISCONTINUED | OUTPATIENT
Start: 2021-04-02 | End: 2021-04-02 | Stop reason: HOSPADM

## 2021-04-02 RX ORDER — ACETAMINOPHEN 500 MG
1000 TABLET ORAL ONCE
Status: COMPLETED | OUTPATIENT
Start: 2021-04-02 | End: 2021-04-02

## 2021-04-02 RX ORDER — LIDOCAINE HYDROCHLORIDE 20 MG/ML
INJECTION, SOLUTION EPIDURAL; INFILTRATION; INTRACAUDAL; PERINEURAL AS NEEDED
Status: DISCONTINUED | OUTPATIENT
Start: 2021-04-02 | End: 2021-04-02 | Stop reason: SURG

## 2021-04-02 RX ORDER — ROCURONIUM BROMIDE 10 MG/ML
INJECTION, SOLUTION INTRAVENOUS AS NEEDED
Status: DISCONTINUED | OUTPATIENT
Start: 2021-04-02 | End: 2021-04-02 | Stop reason: SURG

## 2021-04-02 RX ORDER — TRANEXAMIC ACID 10 MG/ML
1000 INJECTION, SOLUTION INTRAVENOUS ONCE
Status: COMPLETED | OUTPATIENT
Start: 2021-04-02 | End: 2021-04-02

## 2021-04-02 RX ORDER — PROMETHAZINE HYDROCHLORIDE 25 MG/1
25 TABLET ORAL ONCE AS NEEDED
Status: DISCONTINUED | OUTPATIENT
Start: 2021-04-02 | End: 2021-04-02 | Stop reason: HOSPADM

## 2021-04-02 RX ORDER — TRAMADOL HYDROCHLORIDE 50 MG/1
100 TABLET ORAL EVERY 4 HOURS PRN
Status: DISCONTINUED | OUTPATIENT
Start: 2021-04-02 | End: 2021-04-03 | Stop reason: HOSPADM

## 2021-04-02 RX ORDER — NALOXONE HCL 0.4 MG/ML
0.4 VIAL (ML) INJECTION AS NEEDED
Status: DISCONTINUED | OUTPATIENT
Start: 2021-04-02 | End: 2021-04-02 | Stop reason: HOSPADM

## 2021-04-02 RX ORDER — MIDAZOLAM HYDROCHLORIDE 1 MG/ML
INJECTION INTRAMUSCULAR; INTRAVENOUS
Status: COMPLETED | OUTPATIENT
Start: 2021-04-02 | End: 2021-04-02

## 2021-04-02 RX ORDER — ASPIRIN 325 MG
325 TABLET, DELAYED RELEASE (ENTERIC COATED) ORAL DAILY
Status: DISCONTINUED | OUTPATIENT
Start: 2021-04-02 | End: 2021-04-03 | Stop reason: HOSPADM

## 2021-04-02 RX ORDER — NICARDIPINE HYDROCHLORIDE 2.5 MG/ML
INJECTION INTRAVENOUS AS NEEDED
Status: DISCONTINUED | OUTPATIENT
Start: 2021-04-02 | End: 2021-04-02 | Stop reason: SURG

## 2021-04-02 RX ORDER — PROMETHAZINE HYDROCHLORIDE 25 MG/1
25 SUPPOSITORY RECTAL ONCE AS NEEDED
Status: DISCONTINUED | OUTPATIENT
Start: 2021-04-02 | End: 2021-04-02 | Stop reason: HOSPADM

## 2021-04-02 RX ORDER — VANCOMYCIN 1.75 GRAM/500 ML IN 0.9 % SODIUM CHLORIDE INTRAVENOUS
15 ONCE
Status: DISCONTINUED | OUTPATIENT
Start: 2021-04-03 | End: 2021-04-03 | Stop reason: HOSPADM

## 2021-04-02 RX ORDER — FENTANYL CITRATE 50 UG/ML
50 INJECTION, SOLUTION INTRAMUSCULAR; INTRAVENOUS
Status: DISCONTINUED | OUTPATIENT
Start: 2021-04-02 | End: 2021-04-02 | Stop reason: HOSPADM

## 2021-04-02 RX ORDER — LIDOCAINE HYDROCHLORIDE 10 MG/ML
0.5 INJECTION, SOLUTION EPIDURAL; INFILTRATION; INTRACAUDAL; PERINEURAL ONCE AS NEEDED
Status: DISCONTINUED | OUTPATIENT
Start: 2021-04-02 | End: 2021-04-02 | Stop reason: HOSPADM

## 2021-04-02 RX ORDER — KETAMINE HYDROCHLORIDE 10 MG/ML
INJECTION INTRAMUSCULAR; INTRAVENOUS AS NEEDED
Status: DISCONTINUED | OUTPATIENT
Start: 2021-04-02 | End: 2021-04-02 | Stop reason: SURG

## 2021-04-02 RX ORDER — LEVOTHYROXINE SODIUM 112 UG/1
112 TABLET ORAL
Status: DISCONTINUED | OUTPATIENT
Start: 2021-04-03 | End: 2021-04-03 | Stop reason: HOSPADM

## 2021-04-02 RX ORDER — FENTANYL CITRATE 50 UG/ML
INJECTION, SOLUTION INTRAMUSCULAR; INTRAVENOUS AS NEEDED
Status: DISCONTINUED | OUTPATIENT
Start: 2021-04-02 | End: 2021-04-02 | Stop reason: SURG

## 2021-04-02 RX ORDER — FENTANYL CITRATE 50 UG/ML
100 INJECTION, SOLUTION INTRAMUSCULAR; INTRAVENOUS
Status: DISCONTINUED | OUTPATIENT
Start: 2021-04-02 | End: 2021-04-02 | Stop reason: HOSPADM

## 2021-04-02 RX ORDER — DIPHENHYDRAMINE HYDROCHLORIDE 50 MG/ML
INJECTION INTRAMUSCULAR; INTRAVENOUS AS NEEDED
Status: DISCONTINUED | OUTPATIENT
Start: 2021-04-02 | End: 2021-04-02 | Stop reason: SURG

## 2021-04-02 RX ORDER — OXYCODONE HYDROCHLORIDE 5 MG/1
5 TABLET ORAL EVERY 4 HOURS PRN
Status: DISCONTINUED | OUTPATIENT
Start: 2021-04-02 | End: 2021-04-02

## 2021-04-02 RX ORDER — SODIUM CHLORIDE 0.9 % (FLUSH) 0.9 %
10 SYRINGE (ML) INJECTION EVERY 12 HOURS SCHEDULED
Status: DISCONTINUED | OUTPATIENT
Start: 2021-04-02 | End: 2021-04-02 | Stop reason: HOSPADM

## 2021-04-02 RX ORDER — ACETAMINOPHEN 325 MG/1
325 TABLET ORAL EVERY 4 HOURS PRN
Status: DISCONTINUED | OUTPATIENT
Start: 2021-04-02 | End: 2021-04-03 | Stop reason: HOSPADM

## 2021-04-02 RX ORDER — GABAPENTIN 300 MG/1
600 CAPSULE ORAL ONCE
Status: COMPLETED | OUTPATIENT
Start: 2021-04-02 | End: 2021-04-02

## 2021-04-02 RX ADMIN — KETAMINE HYDROCHLORIDE 30 MG: 10 INJECTION, SOLUTION INTRAMUSCULAR; INTRAVENOUS at 13:39

## 2021-04-02 RX ADMIN — FENTANYL CITRATE 50 MCG: 50 INJECTION, SOLUTION INTRAMUSCULAR; INTRAVENOUS at 13:51

## 2021-04-02 RX ADMIN — FENTANYL CITRATE 100 MCG: 50 INJECTION, SOLUTION INTRAMUSCULAR; INTRAVENOUS at 12:30

## 2021-04-02 RX ADMIN — GABAPENTIN 600 MG: 300 CAPSULE ORAL at 11:43

## 2021-04-02 RX ADMIN — PROPOFOL 160 MCG/KG/MIN: 10 INJECTION, EMULSION INTRAVENOUS at 13:18

## 2021-04-02 RX ADMIN — HYDRALAZINE HYDROCHLORIDE 5 MG: 20 INJECTION INTRAMUSCULAR; INTRAVENOUS at 13:47

## 2021-04-02 RX ADMIN — TRAMADOL HYDROCHLORIDE 50 MG: 50 TABLET, COATED ORAL at 21:49

## 2021-04-02 RX ADMIN — NICARDIPINE HYDROCHLORIDE 0.5 MG: 2.5 INJECTION INTRAVENOUS at 13:54

## 2021-04-02 RX ADMIN — SODIUM CHLORIDE, POTASSIUM CHLORIDE, SODIUM LACTATE AND CALCIUM CHLORIDE 9 ML/HR: 600; 310; 30; 20 INJECTION, SOLUTION INTRAVENOUS at 10:52

## 2021-04-02 RX ADMIN — CELECOXIB 400 MG: 200 CAPSULE ORAL at 11:44

## 2021-04-02 RX ADMIN — ASPIRIN 325 MG: 325 TABLET, COATED ORAL at 20:02

## 2021-04-02 RX ADMIN — TRANEXAMIC ACID 1000 MG: 10 INJECTION, SOLUTION INTRAVENOUS at 13:30

## 2021-04-02 RX ADMIN — PROPOFOL 50 MG: 10 INJECTION, EMULSION INTRAVENOUS at 13:47

## 2021-04-02 RX ADMIN — HYDRALAZINE HYDROCHLORIDE 5 MG: 20 INJECTION INTRAMUSCULAR; INTRAVENOUS at 14:11

## 2021-04-02 RX ADMIN — FENTANYL CITRATE 50 MCG: 50 INJECTION, SOLUTION INTRAMUSCULAR; INTRAVENOUS at 15:20

## 2021-04-02 RX ADMIN — DEXAMETHASONE SODIUM PHOSPHATE 8 MG: 4 INJECTION, SOLUTION INTRAMUSCULAR; INTRAVENOUS at 13:25

## 2021-04-02 RX ADMIN — DEXAMETHASONE SODIUM PHOSPHATE 4 MG: 4 INJECTION, SOLUTION INTRAMUSCULAR; INTRAVENOUS at 12:30

## 2021-04-02 RX ADMIN — PROPOFOL 30 MG: 10 INJECTION, EMULSION INTRAVENOUS at 13:42

## 2021-04-02 RX ADMIN — FENTANYL CITRATE 50 MCG: 50 INJECTION, SOLUTION INTRAMUSCULAR; INTRAVENOUS at 15:05

## 2021-04-02 RX ADMIN — MIDAZOLAM 2 MG: 1 INJECTION INTRAMUSCULAR; INTRAVENOUS at 12:30

## 2021-04-02 RX ADMIN — PROPOFOL 40 MG: 10 INJECTION, EMULSION INTRAVENOUS at 13:44

## 2021-04-02 RX ADMIN — Medication 1500 MG: at 13:24

## 2021-04-02 RX ADMIN — ROCURONIUM BROMIDE 40 MG: 10 INJECTION INTRAVENOUS at 13:19

## 2021-04-02 RX ADMIN — FENTANYL CITRATE 50 MCG: 50 INJECTION, SOLUTION INTRAMUSCULAR; INTRAVENOUS at 14:40

## 2021-04-02 RX ADMIN — PROPOFOL 200 MG: 10 INJECTION, EMULSION INTRAVENOUS at 13:18

## 2021-04-02 RX ADMIN — DIPHENHYDRAMINE HYDROCHLORIDE 12.5 MG: 50 INJECTION, SOLUTION INTRAMUSCULAR; INTRAVENOUS at 14:17

## 2021-04-02 RX ADMIN — ONDANSETRON HYDROCHLORIDE 4 MG: 4 TABLET, FILM COATED ORAL at 18:16

## 2021-04-02 RX ADMIN — SODIUM CHLORIDE 100 ML/HR: 9 INJECTION, SOLUTION INTRAVENOUS at 16:44

## 2021-04-02 RX ADMIN — LIDOCAINE HYDROCHLORIDE 100 MG: 20 INJECTION, SOLUTION EPIDURAL; INFILTRATION; INTRACAUDAL; PERINEURAL at 13:18

## 2021-04-02 RX ADMIN — ROPIVACAINE HYDROCHLORIDE 30 ML: 5 INJECTION, SOLUTION EPIDURAL; INFILTRATION; PERINEURAL at 12:30

## 2021-04-02 RX ADMIN — ACETAMINOPHEN 1000 MG: 500 TABLET ORAL at 11:43

## 2021-04-02 RX ADMIN — TRANEXAMIC ACID 1000 MG: 10 INJECTION, SOLUTION INTRAVENOUS at 14:15

## 2021-04-02 RX ADMIN — FENTANYL CITRATE 100 MCG: 50 INJECTION, SOLUTION INTRAMUSCULAR; INTRAVENOUS at 13:18

## 2021-04-02 NOTE — ANESTHESIA POSTPROCEDURE EVALUATION
Patient: More Staton    Procedure Summary     Date: 04/02/21 Room / Location: [facility] OR 12 / [facility] MAIN OR    Anesthesia Start: 1309 Anesthesia Stop: 1450    Procedure: TOTAL KNEE ARTHROPLASTY (Right Knee) Diagnosis:       Knee osteoarthritis      (Knee osteoarthritis [M17.10])    Surgeons: Tarun Cam II, MD Provider: Uriel Zamora MD    Anesthesia Type: general with block ASA Status: 3          Anesthesia Type: general with block    Vitals  Vitals Value Taken Time   /54 04/02/21 1544   Temp 97.5 °F (36.4 °C) 04/02/21 1544   Pulse 60 04/02/21 1545   Resp 12 04/02/21 1544   SpO2 100 % 04/02/21 1545   Vitals shown include unvalidated device data.        Post Anesthesia Care and Evaluation    Patient location during evaluation: PACU  Patient participation: complete - patient participated  Level of consciousness: awake  Pain scale: See nurse's notes for pain score.  Pain management: adequate  Airway patency: patent  Anesthetic complications: No anesthetic complications  PONV Status: none  Cardiovascular status: acceptable  Respiratory status: acceptable  Hydration status: acceptable    Comments: Patient seen and examined postoperatively; vital signs stable; SpO2 greater than or equal to 90%; cardiopulmonary status stable; nausea/vomiting adequately controlled; pain adequately controlled; no apparent anesthesia complications; patient discharged from anesthesia care when discharge criteria were met

## 2021-04-02 NOTE — PLAN OF CARE
Goal Outcome Evaluation:  Plan of Care Reviewed With: patient     Outcome Summary: 61 y/o F POD 0 R TKA per Dr Cam. Patient is independent at baseline and does not typically use AD but does report history of antalgic gait and limited gait distance due to pain. Lives at home with spouse and 1+1 SHON her home with no steps inside. She was limited this date due to symptomatic hypotension in sitting. Not able to intiate transfer and ambulation. R knee ROM 32-90. Will initiate ambulation and TKA HEP tomorrow. Recommending HHPT upon d/c.PPE: mask, goggles, gloves

## 2021-04-02 NOTE — H&P
Orthopaedic Surgery  History & Physical For Elective Total Knee  Dr. LA Cam II  [phone]    HPI:  Patient is a 60 y.o. Not  or  female who presents with End-stage arthritis of the right knee. They failed conservative treatment of their knee pain and a thorough discussion of the risks and benefits of surgery was had. The patient wishes to continue with elective total knee replacement, they were scheduled and are here for surgery. They did get medical clearance as well as a thorough preoperative workup.    MEDICAL HISTORY  Past Medical History:   Diagnosis Date   • Arthritis    • Colon polyp 2019   • Concussion 1995   • Fibromyalgia, primary    • Hypothyroidism 2015   • Osteopenia 2018   • Pancreatitis    • PONV (postoperative nausea and vomiting)     severe, ended up coming to ER post surgery   • Seasonal allergies    ·   Past Surgical History:   Procedure Laterality Date   • BLADDER SUSPENSION     • CHOLECYSTECTOMY     • COLONOSCOPY     • ENDOMETRIAL ABLATION     • EYE SURGERY     • HYSTERECTOMY     • OOPHORECTOMY     • TONSILLECTOMY     • TUBAL ABDOMINAL LIGATION     ·   Prior to Admission medications    Medication Sig Start Date End Date Taking? Authorizing Provider   cetirizine (zyrTEC) 10 MG tablet TAKE ONE TABLET BY MOUTH DAILY 12/7/20  Yes Kerry Mayes MD   levothyroxine (SYNTHROID, LEVOTHROID) 112 MCG tablet Take 112 mcg by mouth Daily. Take dos 9/2/20  Yes Emergency, Nurse Pita, RN   azithromycin (Zithromax Z-Shahid) 250 MG tablet Take 2 tablets the first day, then 1 tablet daily for 4 days. 11/16/20   Kerry Mayes MD   B Complex Vitamins (VITAMIN B COMPLEX) capsule capsule Take 1 capsule by mouth Daily.    Provider, MD Mirella   Calcium-Magnesium (FARZANA-MAG) 500-250 MG tablet Take 1 tablet by mouth Daily.    ProviderMirella MD   Cholecalciferol (SM VITAMIN D3) 4000 units capsule Take 1 capsule by mouth Daily. 3/1/19   Mirella Bacon MD   clindamycin  "(CLEOCIN) 300 MG capsule Take 1 capsule by mouth 3 (Three) Times a Day. 20   Joyce Oconnor APRN   doxycycline (VIBRAMYCIN) 100 MG capsule Take 1 capsule by mouth 2 (Two) Times a Day. 20   Edward Odell APRN   ondansetron ODT (Zofran ODT) 4 MG disintegrating tablet Place 1 tablet on the tongue Every 8 (Eight) Hours As Needed for Nausea or Vomiting. 20   Joyce Oconnor APRN   promethazine-dextromethorphan (PROMETHAZINE-DM) 6.25-15 MG/5ML syrup Take 5 mL by mouth At Night As Needed for Cough. 20   Edward Odell APRN   ·   Allergies   Allergen Reactions   • Codeine Anaphylaxis   • Hydrocodone-Acetaminophen Hives   • Latex Hives   • Penicillins Anaphylaxis, Hives and Shortness Of Breath   • Sulfa Antibiotics Hives   • Sulfur Hives and Itching   • Tramadol Hives   ·   Most Recent Immunizations   Administered Date(s) Administered   • Flu Vaccine Intradermal Quad 18-64YR 10/03/2019   • Flu Vaccine Quad PF >36MO 10/05/2020   • Pneumococcal Conjugate 13-Valent (PCV13) 2017   • Shingrix 10/03/2019   • Zostavax 10/12/2017   ·   Social History     Tobacco Use   • Smoking status: Former Smoker     Packs/day: 1.00     Years: 15.00     Pack years: 15.00     Types: Cigarettes     Start date:      Quit date:      Years since quittin.9   • Smokeless tobacco: Never Used   • Tobacco comment: Off and on use amount varies   Substance Use Topics   • Alcohol use: No   ·    Social History     Substance and Sexual Activity   Drug Use No   ·     REVIEW OF SYSTEMS:  · Head: negative for headache  · Respiratory: negative for shortness of breath.   · Cardiovascular: negative for chest pain.   · Gastrointestinal: negative abdominal pain.   · Neurological: negative for LOC  · Psychiatric/Behavioral: negative for memory loss.   · All other systems reviewed and are negative    VITALS: Ht 170.2 cm (67\")   Wt 113 kg (250 lb)   BMI 39.16 kg/m²  Body mass index is 39.16 " kg/m².    PHYSICAL EXAM:   · CONSTITUTIONAL: A&Ox3, No acute distress  · LUNGS: Equal chest rise, no shortness of air  · CARDIOVASCULAR: palpable peripheral pulses  · SKIN: no skin lesions in the area examined  · LYMPH: no lymphadenopathy in the area examined  · EXTREMITY: Knee  · Pulses:  Brisk Capillary Refill  · Sensation: Intact to Saphenous, Sural, Deep Peroneal, Superficial Peroneal, and Tibial Nerves and grossly throughout extremity  · Motor: 5/5 EHL/FHL/TA/GS motor complexes    RADIOLOGY REVIEW:   No radiology results for the last 7 days    LABS:   Results for the past 24 hours: No results found for this or any previous visit (from the past 24 hour(s)).    IMPRESSION:  Patient is a 60 y.o. Not  or  female with end-stage arthritis of the right knee    PLAN:   · Surgery: Elective total knee arthroplasty  · Consent: The risks and benefits of operative versus nonoperative treatment were discussed. The patient elected to undergo operative treatment of their knee arthritis. The risks discussed included but were not limited to blood clots, MI, stroke, other medical complications, infection, damage to neurovascular structures, continued pain, hardware prominence, loss of range of motion, need for further procedures, and and risk of anesthesia..  No guarantees were made   · Disposition: Elective right Total Knee Arthroplasty today.    Tarun Cam II, MD  Orthopaedic Surgery  [facility]

## 2021-04-02 NOTE — OP NOTE
Total Knee Replacement Operative Note  Dr. LA Cam II  [phone]    PATIENT NAME: More Staton  MRN: [id number]  : 1960 AGE: 60 y.o. GENDER: female  DATE OF OPERATION: 2021  PREOPERATIVE DIAGNOSIS: End Stage Arthritis  POSTOPERATIVE DIAGNOSIS: Same  OPERATION PERFORMED: Right Total Knee Arthroplasty  SURGEON: Tarun Cam MD  Circulator: Natividad Nash RN  Scrub Person: Vivi Jean Baptiste; Lisa Phelan  Assistant: Daina Perez  ANESTHESIA: General with Block   ESTIMATED BLOOD LOSS: 200cc  SPONGE AND NEEDLE COUNT: Correct  INDICATIONS:   A discussion of operative versus nonoperative treatment was had with the patient and they failed conservative management. They elected to undergo total knee arthroplasty. The risks of surgery were discussed and included the risk of anesthesia, infection, damage to neurovascular structures, implant loosening/failure, fracture, hardware prominence, continued pain, early failure, the need for further procedures, medical complications, and others. No guarantees were made. The patient wished to proceed with surgery and a surgical consent was signed.    COMPONENTS:   · Journey II CR Oxinium Femoral Component: Size 5  · Journey II Tibial Baseplate: 4   · CR Articular Insert: 9  · Patella: 32mm    PERTINENT FINDINGS: Degenerative Arthritis    DETAILS OF PROCEDURE:  The patient was met in the preoperative area. The site was marked. The consent and H&P were reviewed. The patient was then wheeled back to the operative suite and transferred to the operative table. The patient underwent anesthesia. A tourniquet was placed on the upper thigh. Surgical alcohol was used to thoroughly clean the entire operative extremity.     The leg was then prepped in the normal sterile fashion and surgical space suits were used for the entire operative team. New outer gloves were used by all sterile surgical team members after final draping. After a surgical timeout,  the tourniquet was inflated.     In flexion, a midline knee incision was utilized centered on the patella and ending medial to the tibial tubercle. Dissection was carried down to the knee capsule. A midvastus ararthrotomy was completed. The patellar fat pad was excised. The MCL was minimally elevated to gain adequate exposure to the knee.      The patella was subluxed laterally. The patella was held vertical using 2 clamps, and was then cut using a saw. The patella was then sized, and the lug holes were drilled. Excess patellar bone was removed using a saw. The patella was then protected during this case using the metal patella shield.    The femoral canal was breached using the reamer. The canal was thoroughly irrigated. The intramedullary femoral jig was inserted. The distal cutting block was pinned in place and held with a kocher clamp. The cut was made with an oscillating saw. With all saw cuts, the soft tissues were protected with retractors. The sizing jig was placed onto the femur and set to the desired amount of external rotation. Rotation was checked against the epicondylar axis and Whitesides line. The femur was then sized. The size matching block was placed and secured with pins. The cuts were then made with oscillating saw in a routine fashion. All bone cuts were removed.     The tibial canal was then breached using the entry drill. The canal was thoroughly irrigated. Next the intramedullary guide was inserted down the tibial canal. The cutting jig was aligned with the medial third of the tibial tubercle. The height of the cut was measured and the cutting jig was then secured with pins. The slope and varus/valgus positioning was checked with an extramedullary rafy which was attached to the cutting jig.     The cut was then made using the oscillating saw, again ensuring that the retractors were in proper position to protect the collateral ligaments and the patellar tendon, as well as the neurovascular  bundle and PCL posteriorly.     A lamina  was inserted into the notch with the knee in flexion and used to expose the posterior joint. Using a kocher and bovie the remaining medial and lateral menisci were removed. Excess posterior osteophytes were also removed with a osteotome, mallet, and rongeur as necessary.      Next a trial of the knee was performed using trial femoral and tibial trial components. Polyethylene trials were inserted as needed to gain appropriate stability. A drop rafy was once again used to assess the varus/valgus alignment of the knee and the knee was noted to be in excellent alignment. Soft tissue releases were then performed as necessary to fine-tune the balancing of the knee.  After taking the knee through one final range of motion, the tibial rotation of the trial was noted. The femoral lug holes were then drilled and the anterior femoral cut was finalized with a saw.    We next turned our attention back to the tibia to finish the tibial preparation. The tibia was measured and sized. The tibial plate was aligned with the rotation from the trialing process and verified to be positioned near the medial third of the tibial tubercle. The tibial surface was then prepared for the keel .    The knee was thoroughly irrigated with sterile saline using a pulse-lavage system while the final tibial baseplate, femoral component and patellar component were opened. Cement was prepared and mixed using standard techniques. Outer gloves were changed before implant handling to ensure no soft tissue or oily material was exposed to the surfaces of the final implants. The bony knee surfaces were dried and the implants were cemented in place, starting with the tibia, then the femur and finally the patella. Excess cement was removed at each step. A trial poly was utilized during cementation for compression. The tourniquet was taken down and adequate hemostasis was achieved. The knee was thoroughly irrigated  "once again.     The soft tissues about the knee were then injected with an anesthetic cocktail. Care was taken to avoid the peroneal nerve and the neurovascular bundle posteriorly. The cement was allowed to harden. After the cement was fully set, the knee was ranged with various thickness of polyethylene trials to achieve full extension and adequate flexion. The knee was inspected for excess cement, which was removed. The real poly, of corresponding thickness was then opened and inserted into the knee. One final range of motion and stability test showed the knee to be in good condition with a well tracking patellar component.    The knee capsule was then closed with a running barbed suture. 2g of tranexamic acid was then injected into the knee joint, and the knee capsule was noted to be water tight. The skin and subcutaneous layer were closed in layers.  A sterile dressing was applied.    The patient was awoken from anesthesia, moved to the [facility] and taken to the recovery room in stable condition. Sponge and needle count were correct. There were no complications. Patient tolerated the procedure well.    R \"Osito\" Dorina FARR MD  Orthopaedic Surgery  [facility]  [phone]                  "

## 2021-04-02 NOTE — ANESTHESIA PREPROCEDURE EVALUATION
Anesthesia Evaluation     Patient summary reviewed and Nursing notes reviewed   history of anesthetic complications: PONV  NPO Solid Status: > 8 hours  NPO Liquid Status: > 2 hours           Airway   Mallampati: II  TM distance: >3 FB  Neck ROM: full  No difficulty expected  Dental - normal exam     Pulmonary    Cardiovascular         Neuro/Psych  (+) psychiatric history,     GI/Hepatic/Renal/Endo      Musculoskeletal     Abdominal    Substance History      OB/GYN          Other   arthritis,      ROS/Med Hx Other: Sjogren's syndrome                  Anesthesia Plan    ASA 3     general with block   (Eras protocol.  Adductor canal block)  intravenous induction     Anesthetic plan, all risks, benefits, and alternatives have been provided, discussed and informed consent has been obtained with: patient.    Plan discussed with CRNA and CAA.

## 2021-04-02 NOTE — PLAN OF CARE
Problem: Adult Inpatient Plan of Care  Goal: Plan of Care Review  Outcome: Ongoing, Progressing  Goal: Patient-Specific Goal (Individualized)  Outcome: Ongoing, Progressing  Goal: Absence of Hospital-Acquired Illness or Injury  Outcome: Ongoing, Progressing  Goal: Optimal Comfort and Wellbeing  Outcome: Ongoing, Progressing  Goal: Readiness for Transition of Care  Outcome: Ongoing, Progressing   Goal Outcome Evaluation:      Patient observed with eyes closed in supine position. Rise and fall of chest observed. Dressings clean dry and intact. Precautions in place ,will continue to monitor purposefully, pt is very cold.

## 2021-04-02 NOTE — ANESTHESIA PROCEDURE NOTES
Airway  Urgency: elective    Date/Time: 4/2/2021 1:21 PM    General Information and Staff    Patient location during procedure: OR  Anesthesiologist: Uriel Zamora MD  CRNA: Nano Burroughs AA    Indications and Patient Condition  Indications for airway management: airway protection    Preoxygenated: yes  MILS maintained throughout  Mask difficulty assessment: 1 - vent by mask    Final Airway Details  Final airway type: endotracheal airway      Successful airway: ETT  Cuffed: yes   Successful intubation technique: direct laryngoscopy  Facilitating devices/methods: intubating stylet  Endotracheal tube insertion site: oral  Blade: Erica  Blade size: 4  ETT size (mm): 7.0  Cormack-Lehane Classification: grade I - full view of glottis  Placement verified by: chest auscultation and capnometry   Cuff volume (mL): 6  Measured from: lips  ETT/EBT  to lips (cm): 21  Number of attempts at approach: 1  Assessment: lips, teeth, and gum same as pre-op and atraumatic intubation

## 2021-04-02 NOTE — ANESTHESIA PROCEDURE NOTES
Peripheral Block    Pre-sedation assessment completed: 4/2/2021 12:20 PM    Patient reassessed immediately prior to procedure    Patient location during procedure: pre-op  Start time: 4/2/2021 12:20 PM  Stop time: 4/2/2021 12:30 PM  Reason for block: at surgeon's request and post-op pain management  Performed by  Anesthesiologist: Uriel Zamora MD  Preanesthetic Checklist  Completed: patient identified, IV checked, site marked, risks and benefits discussed, surgical consent, monitors and equipment checked, pre-op evaluation and timeout performed  Prep:  Pt Position: sitting  Sterile barriers:cap, gloves and mask  Prep: ChloraPrep  Patient monitoring: blood pressure monitoring, continuous pulse oximetry and EKG  Procedure  Sedation:yes    Guidance:ultrasound guided  ULTRASOUND INTERPRETATION. Using ultrasound guidance a 21 G gauge needle was placed in close proximity to the nerve, at which point, under ultrasound guidance anesthetic was injected in the area of the nerve and spread of the anesthesia was seen on ultrasound in close proximity thereto.  There were no abnormalities seen on ultrasound; a digital image was taken; and the patient tolerated the procedure with no complications. Images:still images obtained, printed/placed on chart    Laterality:right  Block Type:adductor canal block  Injection Technique:single-shot  Needle Type:echogenic    Sedation medications used: midazolam (VERSED) injection, 2 mg  fentaNYL citrate (PF) (SUBLIMAZE) injection, 100 mcg  Medications Used: dexamethasone (DECADRON) injection, 4 mg  ropivacaine (NAROPIN) 0.5 % injection, 30 mL  Med admintered at 4/2/2021 12:30 PM      Post Assessment  Injection Assessment: negative aspiration for heme, no paresthesia on injection and incremental injection  Patient Tolerance:comfortable throughout block  Complications:no

## 2021-04-02 NOTE — THERAPY EVALUATION
Patient Name: More Staton  : 1960    MRN: [id number]                              Today's Date: 2021       Admit Date: 2021    Visit Dx: No diagnosis found.  Patient Active Problem List   Diagnosis   • Anxiety   • Alopecia   • Arthralgia   • Inflammatory arthritis   • Bursitis   • Sjogren's syndrome (CMS/HCC)   • Depression   • Hyperinsulinemia   • Hypothyroidism   • Osteoarthritis of hand   • Shortness of breath   • Vitamin D deficiency   • Cellulitis of left lower extremity   • Chest pain at rest   • Transition of care performed with sharing of clinical summary   • Total knee replacement status     Past Medical History:   Diagnosis Date   • Arthritis    • Colon polyp 2019   • Concussion    • Hypothyroidism    • Osteopenia    • Pancreatitis    • PONV (postoperative nausea and vomiting)     severe, ended up coming to ER post surgery   • Seasonal allergies      Past Surgical History:   Procedure Laterality Date   • BLADDER SUSPENSION     • CHOLECYSTECTOMY     • COLONOSCOPY     • ENDOMETRIAL ABLATION     • EYE SURGERY     • HYSTERECTOMY     • OOPHORECTOMY     • TONSILLECTOMY     • TUBAL ABDOMINAL LIGATION       General Information     Row Name 21 1723          Physical Therapy Time and Intention    Document Type  evaluation  -SS     Mode of Treatment  physical therapy  -SS     Row Name 21 1723          General Information    Patient Profile Reviewed  yes  -SS     Prior Level of Function  independent:;ADL's;community mobility;driving  -SS     Row Name 21 1723          Living Environment    Lives With  spouse  -SS     Row Name 21 1723          Home Main Entrance    Number of Stairs, Main Entrance  two 1+1  -SS     Row Name 21 1723          Stairs Within Home, Primary    Number of Stairs, Within Home, Primary  none  -SS     Row Name 21 1723          Cognition    Orientation Status (Cognition)  oriented x 4  -SS     Row Name 21 1723           Safety Issues, Functional Mobility    Comment, Safety Issues/Impairments (Mobility)  hypotension in sitting  -       User Key  (r) = Recorded By, (t) = Taken By, (c) = Cosigned By    Initials Name Provider Type    SS Rissa Wang PT Physical Therapist        Mobility     Row Name 04/02/21 1724          Bed Mobility    Bed Mobility  bed mobility (all) activities  -     All Activities, [place] (Bed Mobility)  verbal cues;supervision  -     Comment (Bed Mobility)  Pt sat EOB and became pale, reported nausea and lightheadedness. BP 91/61. Does not improve with time. Assisted pt back to supine and RN informed. /57 in supine  -     Row Name 04/02/21 1724          Transfers    Comment (Transfers)  not appropriate due to hypotension in sitting. pt disappointed that she is unable to transfer to chair. educated she could attempt later with RN and PT would return in AM  -[facility] Name 04/02/21 1724          Mobility    Extremity Weight-bearing Status  right lower extremity  -     Right Lower Extremity (Weight-bearing Status)  weight-bearing as tolerated (WBAT)  -       User Key  (r) = Recorded By, (t) = Taken By, (c) = Cosigned By    Initials Name Provider Type     Rissa Wang PT Physical Therapist        Obj/Interventions     Row Name 04/02/21 1727          Range of Motion Comprehensive    Comment, General Range of Motion  R knee ROM 32-90 deg otherwise WFL  -[facility] Name 04/02/21 1727          Strength Comprehensive (MMT)    Comment, General Manual Muscle Testing (MMT) Assessment  R LE 3-/5 block still active  -     Row Name 04/02/21 1729          Motor Skills    Therapeutic Exercise  -- will initiate full TKA HEP tomorrow- instructed to begin with ankle pumps tonight  -     Row Name 04/02/21 1727          Balance    Balance Assessment  sitting static balance  -     Static Sitting Balance  WNL  -[facility] Name 04/02/21 1727          Sensory Assessment (Somatosensory)     Sensory Assessment (Somatosensory)  -- block still somewhat active R LE  -SS       User Key  (r) = Recorded By, (t) = Taken By, (c) = Cosigned By    Initials Name Provider Type    SS Rissa Wang, PT Physical Therapist        Goals/Plan     Row Name 04/02/21 1735          Bed Mobility Goal 1 (PT)    Activity/Assistive Device (Bed Mobility Goal 1, PT)  bed mobility activities, all  -SS     [place] Level/Cues Needed (Bed Mobility Goal 1, PT)  modified independence  -SS     Time Frame (Bed Mobility Goal 1, PT)  long term goal (LTG);2 weeks  -SS     Row Name 04/02/21 1735          Transfer Goal 1 (PT)    Activity/Assistive Device (Transfer Goal 1, PT)  transfers, all  -SS     [place] Level/Cues Needed (Transfer Goal 1, PT)  modified independence  -SS     Time Frame (Transfer Goal 1, PT)  long term goal (LTG);2 weeks  -     Row Name 04/02/21 1735          Gait Training Goal 1 (PT)    Activity/Assistive Device (Gait Training Goal 1, PT)  gait (walking locomotion);walker, rolling  -SS     [place] Level (Gait Training Goal 1, PT)  modified independence  -SS     Distance (Gait Training Goal 1, PT)  150  -SS     Time Frame (Gait Training Goal 1, PT)  long term goal (LTG);2 weeks  -     Row Name 04/02/21 1735          Stairs Goal 1 (PT)    Activity/Assistive Device (Stairs Goal 1, PT)  stairs, all skills;walker, rolling  -SS     [place] Level/Cues Needed (Stairs Goal 1, PT)  modified independence  -SS     Number of Stairs (Stairs Goal 1, PT)  platform  -SS     Time Frame (Stairs Goal 1, PT)  long term goal (LTG);2 weeks  -     Row Name 04/02/21 1735          Patient Education Goal (PT)    Activity (Patient Education Goal, PT)  TKA HEP  -SS     [place]/Cues/Accuracy (Memory Goal 2, PT)  demonstrates adequately;independent;verbalizes understanding  -SS     Time Frame (Patient Education Goal, PT)  long term goal (LTG);2 weeks  -SS       User Key  (r) = Recorded By, (t) = Taken By, (c) =  Cosigned By    Initials Name Provider Type     Rissa Wang PT Physical Therapist        Clinical Impression     Row Name 04/02/21 1731          Pain    Additional Documentation  Pain Scale: FACES Pre/Post-Treatment (Group)  -     Row Name 04/02/21 1731          Pain Scale: FACES Pre/Post-Treatment    Pain: FACES Scale, Pretreatment  2-->hurts little bit  -     Posttreatment Pain Rating  2-->hurts little bit  -     Row Name 04/02/21 1731          Plan of Care Review    Plan of Care Reviewed With  patient  -     Outcome Summary  59 y/o F POD 0 R TKA per Dr Cam. Patient is independent at baseline and does not typically use AD but does report history of antalgic gait and limited gait distance due to pain. Lives at home with spouse and 1+1 SHON her home with no steps inside. She was limited this date due to symptomatic hypotension in sitting. Not able to intiate transfer and ambulation. R knee ROM 32-90. Will initiate ambulation and TKA HEP tomorrow. Recommending HHPT upon d/c.  -     Row Name 04/02/21 1731          Therapy Assessment/Plan (PT)    Rehab Potential (PT)  good, to achieve stated therapy goals  -     Criteria for Skilled Interventions Met (PT)  yes;meets criteria  -     Predicted Duration of Therapy Intervention (PT)  until d/c  -     Row Name 04/02/21 1731          Positioning and Restraints    Pre-Treatment Position  in bed  -     Post Treatment Position  bed  -       User Key  (r) = Recorded By, (t) = Taken By, (c) = Cosigned By    Initials Name Provider Type     Rissa Wang PT Physical Therapist        Outcome Measures    No documentation.       Physical Therapy Education                 Title: PT OT SLP Therapies (Done)     Topic: Physical Therapy (Done)     Point: Mobility training (Done)     Learning Progress Summary           Patient Acceptance, E, VU by  at 4/2/2021 1736                               User Key     Initials Effective Dates Name Provider  Type Discipline     06/19/19 -  Rissa Wang PT Physical Therapist PT              PT Recommendation and Plan  Planned Therapy Interventions (PT): balance training, bed mobility training, gait training, home exercise program, transfer training, stretching, strengthening, stair training, ROM (range of motion)  Plan of Care Reviewed With: patient  Outcome Summary: 61 y/o F POD 0 R TKA per Dr Cam. Patient is independent at baseline and does not typically use AD but does report history of antalgic gait and limited gait distance due to pain. Lives at home with spouse and 1+1 SHON her home with no steps inside. She was limited this date due to symptomatic hypotension in sitting. Not able to intiate transfer and ambulation. R knee ROM 32-90. Will initiate ambulation and TKA HEP tomorrow. Recommending HHPT upon d/c.     Time Calculation:   PT Charges     Row Name 04/02/21 1737             Time Calculation    Start Time  1640  -      Stop Time  1710  -      Time Calculation (min)  30 min  -      PT Received On  04/02/21  -      PT - Next Appointment  04/03/21  -      PT Goal Re-Cert Due Date  04/16/21  -         Time Calculation- PT    Total Timed Code Minutes- PT  0 minute(s)  -        User Key  (r) = Recorded By, (t) = Taken By, (c) = Cosigned By    Initials Name Provider Type    SS Rissa Wang PT Physical Therapist        Therapy Charges for Today     Code Description Service Date Service Provider Modifiers Qty    36123980261 HC PT EVAL MOD COMPLEXITY 4 4/2/2021 Rissa Wang PT GP 1               Rissa Wang PT  4/2/2021

## 2021-04-03 ENCOUNTER — READMISSION MANAGEMENT (OUTPATIENT)
Dept: CALL CENTER | Facility: HOSPITAL | Age: 61
End: 2021-04-03

## 2021-04-03 VITALS
DIASTOLIC BLOOD PRESSURE: 59 MMHG | WEIGHT: 257.94 LBS | BODY MASS INDEX: 40.48 KG/M2 | HEART RATE: 68 BPM | SYSTOLIC BLOOD PRESSURE: 116 MMHG | RESPIRATION RATE: 18 BRPM | TEMPERATURE: 97.8 F | HEIGHT: 67 IN | OXYGEN SATURATION: 96 %

## 2021-04-03 LAB
ANION GAP SERPL CALCULATED.3IONS-SCNC: 9 MMOL/L (ref 5–15)
BUN SERPL-MCNC: 17 MG/DL (ref 8–23)
BUN/CREAT SERPL: 18.7 (ref 7–25)
CALCIUM SPEC-SCNC: 8.3 MG/DL (ref 8.6–10.5)
CHLORIDE SERPL-SCNC: 106 MMOL/L (ref 98–107)
CO2 SERPL-SCNC: 21 MMOL/L (ref 22–29)
CREAT SERPL-MCNC: 0.91 MG/DL (ref 0.57–1)
DEPRECATED RDW RBC AUTO: 43.3 FL (ref 37–54)
ERYTHROCYTE [DISTWIDTH] IN BLOOD BY AUTOMATED COUNT: 14.1 % (ref 12.3–15.4)
GFR SERPL CREATININE-BSD FRML MDRD: 63 ML/MIN/1.73
GLUCOSE SERPL-MCNC: 121 MG/DL (ref 65–99)
HCT VFR BLD AUTO: 29.8 % (ref 34–46.6)
HGB BLD-MCNC: 9.8 G/DL (ref 12–15.9)
MCH RBC QN AUTO: 28.6 PG (ref 26.6–33)
MCHC RBC AUTO-ENTMCNC: 32.8 G/DL (ref 31.5–35.7)
MCV RBC AUTO: 87.1 FL (ref 79–97)
PLATELET # BLD AUTO: 206 10*3/MM3 (ref 140–450)
PMV BLD AUTO: 8.9 FL (ref 6–12)
POTASSIUM SERPL-SCNC: 4.2 MMOL/L (ref 3.5–5.2)
RBC # BLD AUTO: 3.42 10*6/MM3 (ref 3.77–5.28)
SODIUM SERPL-SCNC: 136 MMOL/L (ref 136–145)
WBC # BLD AUTO: 13 10*3/MM3 (ref 3.4–10.8)

## 2021-04-03 PROCEDURE — G0378 HOSPITAL OBSERVATION PER HR: HCPCS

## 2021-04-03 PROCEDURE — 97116 GAIT TRAINING THERAPY: CPT

## 2021-04-03 PROCEDURE — 97110 THERAPEUTIC EXERCISES: CPT

## 2021-04-03 PROCEDURE — 85027 COMPLETE CBC AUTOMATED: CPT | Performed by: ORTHOPAEDIC SURGERY

## 2021-04-03 PROCEDURE — 80048 BASIC METABOLIC PNL TOTAL CA: CPT | Performed by: ORTHOPAEDIC SURGERY

## 2021-04-03 RX ORDER — ASPIRIN 325 MG
325 TABLET, DELAYED RELEASE (ENTERIC COATED) ORAL DAILY
Qty: 30 TABLET | Refills: 0 | Status: SHIPPED | OUTPATIENT
Start: 2021-04-04 | End: 2021-12-30

## 2021-04-03 RX ORDER — TRAMADOL HYDROCHLORIDE 50 MG/1
50 TABLET ORAL EVERY 4 HOURS PRN
Qty: 50 TABLET | Refills: 0 | Status: SHIPPED | OUTPATIENT
Start: 2021-04-03 | End: 2021-04-09

## 2021-04-03 RX ADMIN — LEVOTHYROXINE SODIUM 112 MCG: 112 TABLET ORAL at 06:15

## 2021-04-03 RX ADMIN — SODIUM CHLORIDE 100 ML/HR: 9 INJECTION, SOLUTION INTRAVENOUS at 02:16

## 2021-04-03 RX ADMIN — TRAMADOL HYDROCHLORIDE 50 MG: 50 TABLET, COATED ORAL at 04:33

## 2021-04-03 RX ADMIN — TRAMADOL HYDROCHLORIDE 50 MG: 50 TABLET, COATED ORAL at 10:46

## 2021-04-03 RX ADMIN — MELOXICAM 15 MG: 15 TABLET ORAL at 09:11

## 2021-04-03 NOTE — DISCHARGE PLACEMENT REQUEST
"More Staton (60 y.o. Female)     Date of Birth Social Security Number Address Home Phone MRN    1960  [address]  [place] [address] [phone] [id number]    Muslim Marital Status          None        Admission Date Admission Type Admitting Provider Attending Provider Department, Room/Bed    4/2/21 Elective Tarun Cam II, MD  [facility], 4131/1    Discharge Date Discharge Disposition Discharge Destination        4/3/2021 Home or Self Care              Attending Provider: (none)   Allergies: Codeine, Hydrocodone-acetaminophen, Latex, Penicillins, Sulfa Antibiotics, Sulfur    Isolation: None   Infection: None   Code Status: CPR    Ht: 170.2 cm (67.01\")   Wt: 117 kg (257 lb 15 oz)    Admission Cmt: None   Principal Problem: None                Active Insurance as of 4/2/2021     Primary Coverage     Payor Plan Insurance Group Employer/Plan Group    [facility] [id number]     Payor Plan Address Payor Plan Phone Number Payor Plan Fax Number Effective Dates    [address] [address] [phone]  1/1/2020 - None Entered    [facility] [id number]       Subscriber Name Subscriber Birth Date Member ID       ZAKI DELUNA 8/3/1961 [id number]                 Emergency Contacts      (Rel.) Home Phone Work Phone Mobile Phone    [email or web address] (Spouse) -- -- [phone]               History & Physical      Tarun Cam II, MD at 04/02/21 1005            Orthopaedic Surgery  History & Physical For Elective Total Knee  Dr. TOVAR “Osito” Dorina FARR  [phone]    HPI:  Patient is a 60 y.o. Not  or  female who presents with End-stage arthritis of the right knee. They failed conservative treatment of their knee pain and a thorough discussion of the risks and benefits of surgery was had. The patient wishes to continue with elective total knee replacement, they were scheduled and are here for surgery. They did get medical clearance " as well as a thorough preoperative workup.    MEDICAL HISTORY  Past Medical History:   Diagnosis Date   • Arthritis    • Colon polyp 2019   • Concussion 1995   • Fibromyalgia, primary    • Hypothyroidism 2015   • Osteopenia 2018   • Pancreatitis    • PONV (postoperative nausea and vomiting)     severe, ended up coming to ER post surgery   • Seasonal allergies    ·   Past Surgical History:   Procedure Laterality Date   • BLADDER SUSPENSION     • CHOLECYSTECTOMY     • COLONOSCOPY     • ENDOMETRIAL ABLATION     • EYE SURGERY     • HYSTERECTOMY     • OOPHORECTOMY     • TONSILLECTOMY     • TUBAL ABDOMINAL LIGATION     ·   Prior to Admission medications    Medication Sig Start Date End Date Taking? Authorizing Provider   cetirizine (zyrTEC) 10 MG tablet TAKE ONE TABLET BY MOUTH DAILY 12/7/20  Yes Kerry Mayes MD   levothyroxine (SYNTHROID, LEVOTHROID) 112 MCG tablet Take 112 mcg by mouth Daily. Take dos 9/2/20  Yes Emergency, Nurse Pita, RN   azithromycin (Zithromax Z-Shahid) 250 MG tablet Take 2 tablets the first day, then 1 tablet daily for 4 days. 11/16/20   Kerry Mayes MD   B Complex Vitamins (VITAMIN B COMPLEX) capsule capsule Take 1 capsule by mouth Daily.    Provider, MD iMrella   Calcium-Magnesium (FARZANA-MAG) 500-250 MG tablet Take 1 tablet by mouth Daily.    Provider, MD Mirella   Cholecalciferol (SM VITAMIN D3) 4000 units capsule Take 1 capsule by mouth Daily. 3/1/19   ProviderMirella MD   clindamycin (CLEOCIN) 300 MG capsule Take 1 capsule by mouth 3 (Three) Times a Day. 9/18/20   Joyce Oconnor APRN   doxycycline (VIBRAMYCIN) 100 MG capsule Take 1 capsule by mouth 2 (Two) Times a Day. 11/13/20   Edward Odell APRN   ondansetron ODT (Zofran ODT) 4 MG disintegrating tablet Place 1 tablet on the tongue Every 8 (Eight) Hours As Needed for Nausea or Vomiting. 11/17/20   Joyce Oconnor APRN   promethazine-dextromethorphan (PROMETHAZINE-DM) 6.25-15 MG/5ML syrup Take 5 mL  "by mouth At Night As Needed for Cough. 20   Edward Odell APRN   ·   Allergies   Allergen Reactions   • Codeine Anaphylaxis   • Hydrocodone-Acetaminophen Hives   • Latex Hives   • Penicillins Anaphylaxis, Hives and Shortness Of Breath   • Sulfa Antibiotics Hives   • Sulfur Hives and Itching   • Tramadol Hives   ·   Most Recent Immunizations   Administered Date(s) Administered   • Flu Vaccine Intradermal Quad 18-64YR 10/03/2019   • Flu Vaccine Quad PF >36MO 10/05/2020   • Pneumococcal Conjugate 13-Valent (PCV13) 2017   • Shingrix 10/03/2019   • Zostavax 10/12/2017   ·   Social History     Tobacco Use   • Smoking status: Former Smoker     Packs/day: 1.00     Years: 15.00     Pack years: 15.00     Types: Cigarettes     Start date:      Quit date:      Years since quittin.9   • Smokeless tobacco: Never Used   • Tobacco comment: Off and on use amount varies   Substance Use Topics   • Alcohol use: No   ·    Social History     Substance and Sexual Activity   Drug Use No   ·     REVIEW OF SYSTEMS:  · Head: negative for headache  · Respiratory: negative for shortness of breath.   · Cardiovascular: negative for chest pain.   · Gastrointestinal: negative abdominal pain.   · Neurological: negative for LOC  · Psychiatric/Behavioral: negative for memory loss.   · All other systems reviewed and are negative    VITALS: Ht 170.2 cm (67\")   Wt 113 kg (250 lb)   BMI 39.16 kg/m²  Body mass index is 39.16 kg/m².    PHYSICAL EXAM:   · CONSTITUTIONAL: A&Ox3, No acute distress  · LUNGS: Equal chest rise, no shortness of air  · CARDIOVASCULAR: palpable peripheral pulses  · SKIN: no skin lesions in the area examined  · LYMPH: no lymphadenopathy in the area examined  · EXTREMITY: Knee  · Pulses:  Brisk Capillary Refill  · Sensation: Intact to Saphenous, Sural, Deep Peroneal, Superficial Peroneal, and Tibial Nerves and grossly throughout extremity  · Motor: 5/5 EHL/FHL/TA/GS motor complexes    RADIOLOGY REVIEW: "   No radiology results for the last 7 days    LABS:   Results for the past 24 hours: No results found for this or any previous visit (from the past 24 hour(s)).    IMPRESSION:  Patient is a 60 y.o. Not  or  female with end-stage arthritis of the right knee    PLAN:   · Surgery: Elective total knee arthroplasty  · Consent: The risks and benefits of operative versus nonoperative treatment were discussed. The patient elected to undergo operative treatment of their knee arthritis. The risks discussed included but were not limited to blood clots, MI, stroke, other medical complications, infection, damage to neurovascular structures, continued pain, hardware prominence, loss of range of motion, need for further procedures, and and risk of anesthesia..  No guarantees were made   · Disposition: Elective right Total Knee Arthroplasty today.    Tarun Cam II, MD  Orthopaedic Surgery  [facility]      Electronically signed by Tarun Cam II, MD at 04/02/21 1005     H&P signed by New Onbase, [place] at 04/01/21 1017      Scan on 3/23/2021 by New Onbase, [place]: H&amp;P RT KNEE, [facility], 03/19/2021          Electronically signed by New Onbase, [place] at 04/01/21 1017          Physical Therapy Notes (last 24 hours) (Notes from 04/02/21 1149 through 04/03/21 1149)      Rissa Wang, PT at 04/02/21 3666  Version 1 of 1       Goal Outcome Evaluation:  Plan of Care Reviewed With: patient     Outcome Summary: 59 y/o F POD 0 R TKA per Dr Cam. Patient is independent at baseline and does not typically use AD but does report history of antalgic gait and limited gait distance due to pain. Lives at home with spouse and 1+1 SHON her home with no steps inside. She was limited this date due to symptomatic hypotension in sitting. Not able to intiate transfer and ambulation. R knee ROM 32-90. Will initiate ambulation and TKA HEP tomorrow. Recommending HHPT upon d/c.PPE: mask,  goggles, gloves    Electronically signed by Rissa Wang, PT at 21     Rissa Wang PT at 21  Version 1 of          Patient Name: More Staton  : 1960    MRN: [id number]                              Today's Date: 2021       Admit Date: 2021    Visit Dx: No diagnosis found.  Patient Active Problem List   Diagnosis   • Anxiety   • Alopecia   • Arthralgia   • Inflammatory arthritis   • Bursitis   • Sjogren's syndrome (CMS/HCC)   • Depression   • Hyperinsulinemia   • Hypothyroidism   • Osteoarthritis of hand   • Shortness of breath   • Vitamin D deficiency   • Cellulitis of left lower extremity   • Chest pain at rest   • Transition of care performed with sharing of clinical summary   • Total knee replacement status     Past Medical History:   Diagnosis Date   • Arthritis    • Colon polyp    • Concussion    • Hypothyroidism    • Osteopenia    • Pancreatitis    • PONV (postoperative nausea and vomiting)     severe, ended up coming to ER post surgery   • Seasonal allergies      Past Surgical History:   Procedure Laterality Date   • BLADDER SUSPENSION     • CHOLECYSTECTOMY     • COLONOSCOPY     • ENDOMETRIAL ABLATION     • EYE SURGERY     • HYSTERECTOMY     • OOPHORECTOMY     • TONSILLECTOMY     • TUBAL ABDOMINAL LIGATION       General Information     Row Name 21 1723          Physical Therapy Time and Intention    Document Type  evaluation  -SS     Mode of Treatment  physical therapy  -SS     Row Name 21 1723          General Information    Patient Profile Reviewed  yes  -SS     Prior Level of Function  independent:;ADL's;community mobility;driving  -SS     Row Name 21 1723          Living Environment    Lives With  spouse  -SS     Row Name 21 1723          Home Main Entrance    Number of Stairs, Main Entrance  two 1+1  -SS     Row Name 21 1723          Stairs Within Home, Primary    Number of Stairs, Within Home,  Primary  none  -     Row Name 04/02/21 1723          Cognition    Orientation Status (Cognition)  oriented x 4  -     Row Name 04/02/21 1723          Safety Issues, Functional Mobility    Comment, Safety Issues/Impairments (Mobility)  hypotension in sitting  -       User Key  (r) = Recorded By, (t) = Taken By, (c) = Cosigned By    Initials Name Provider Type    SS Rissa Wang PT Physical Therapist        Mobility     Row Name 04/02/21 1724          Bed Mobility    Bed Mobility  bed mobility (all) activities  -     All Activities, [place] (Bed Mobility)  verbal cues;supervision  -     Comment (Bed Mobility)  Pt sat EOB and became pale, reported nausea and lightheadedness. BP 91/61. Does not improve with time. Assisted pt back to supine and RN informed. /57 in supine  -[facility] Name 04/02/21 1724          Transfers    Comment (Transfers)  not appropriate due to hypotension in sitting. pt disappointed that she is unable to transfer to chair. educated she could attempt later with RN and PT would return in AM  -[facility] Name 04/02/21 1724          Mobility    Extremity Weight-bearing Status  right lower extremity  -     Right Lower Extremity (Weight-bearing Status)  weight-bearing as tolerated (WBAT)  -       User Key  (r) = Recorded By, (t) = Taken By, (c) = Cosigned By    Initials Name Provider Type     Rissa Wang PT Physical Therapist        Obj/Interventions     Row Name 04/02/21 1727          Range of Motion Comprehensive    Comment, General Range of Motion  R knee ROM 32-90 deg otherwise WFL  -[facility] Name 04/02/21 1727          Strength Comprehensive (MMT)    Comment, General Manual Muscle Testing (MMT) Assessment  R LE 3-/5 block still active  -     Row Name 04/02/21 1729          Motor Skills    Therapeutic Exercise  -- will initiate full TKA HEP tomorrow- instructed to begin with ankle pumps tonight  -     Row Name 04/02/21 1727          Balance     Balance Assessment  sitting static balance  -SS     Static Sitting Balance  WNL  -SS     Row Name 04/02/21 1727          Sensory Assessment (Somatosensory)    Sensory Assessment (Somatosensory)  -- block still somewhat active R LE  -SS       User Key  (r) = Recorded By, (t) = Taken By, (c) = Cosigned By    Initials Name Provider Type    SS Rissa Wang, PT Physical Therapist        Goals/Plan     Row Name 04/02/21 1735          Bed Mobility Goal 1 (PT)    Activity/Assistive Device (Bed Mobility Goal 1, PT)  bed mobility activities, all  -SS     [place] Level/Cues Needed (Bed Mobility Goal 1, PT)  modified independence  -SS     Time Frame (Bed Mobility Goal 1, PT)  long term goal (LTG);2 weeks  -SS     Row Name 04/02/21 1735          Transfer Goal 1 (PT)    Activity/Assistive Device (Transfer Goal 1, PT)  transfers, all  -SS     [place] Level/Cues Needed (Transfer Goal 1, PT)  modified independence  -SS     Time Frame (Transfer Goal 1, PT)  long term goal (LTG);2 weeks  -SS     Row Name 04/02/21 1735          Gait Training Goal 1 (PT)    Activity/Assistive Device (Gait Training Goal 1, PT)  gait (walking locomotion);walker, rolling  -SS     [place] Level (Gait Training Goal 1, PT)  modified independence  -SS     Distance (Gait Training Goal 1, PT)  150  -SS     Time Frame (Gait Training Goal 1, PT)  long term goal (LTG);2 weeks  -SS     Row Name 04/02/21 1735          Stairs Goal 1 (PT)    Activity/Assistive Device (Stairs Goal 1, PT)  stairs, all skills;walker, rolling  -SS     [place] Level/Cues Needed (Stairs Goal 1, PT)  modified independence  -SS     Number of Stairs (Stairs Goal 1, PT)  platform  -SS     Time Frame (Stairs Goal 1, PT)  long term goal (LTG);2 weeks  -SS     Row Name 04/02/21 0955          Patient Education Goal (PT)    Activity (Patient Education Goal, PT)  TKA HEP  -SS     [place]/Cues/Accuracy (Memory Goal 2, PT)  demonstrates adequately;independent;verbalizes  understanding  -SS     Time Frame (Patient Education Goal, PT)  long term goal (LTG);2 weeks  -SS       User Key  (r) = Recorded By, (t) = Taken By, (c) = Cosigned By    Initials Name Provider Type    Rissa Brewer PT Physical Therapist        Clinical Impression     Row Name 04/02/21 1731          Pain    Additional Documentation  Pain Scale: FACES Pre/Post-Treatment (Group)  -     Row Name 04/02/21 1731          Pain Scale: FACES Pre/Post-Treatment    Pain: FACES Scale, Pretreatment  2-->hurts little bit  -SS     Posttreatment Pain Rating  2-->hurts little bit  -SS     Row Name 04/02/21 1731          Plan of Care Review    Plan of Care Reviewed With  patient  -SS     Outcome Summary  61 y/o F POD 0 R TKA per Dr Cam. Patient is independent at baseline and does not typically use AD but does report history of antalgic gait and limited gait distance due to pain. Lives at home with spouse and 1+1 SHON her home with no steps inside. She was limited this date due to symptomatic hypotension in sitting. Not able to intiate transfer and ambulation. R knee ROM 32-90. Will initiate ambulation and TKA HEP tomorrow. Recommending HHPT upon d/c.  -SS     Row Name 04/02/21 1731          Therapy Assessment/Plan (PT)    Rehab Potential (PT)  good, to achieve stated therapy goals  -SS     Criteria for Skilled Interventions Met (PT)  yes;meets criteria  -     Predicted Duration of Therapy Intervention (PT)  until d/c  -SS     Row Name 04/02/21 1731          Positioning and Restraints    Pre-Treatment Position  in bed  -SS     Post Treatment Position  bed  -SS       User Key  (r) = Recorded By, (t) = Taken By, (c) = Cosigned By    Initials Name Provider Type    Rissa Brewer PT Physical Therapist        Outcome Measures    No documentation.       Physical Therapy Education                 Title: PT OT SLP Therapies (Done)     Topic: Physical Therapy (Done)     Point: Mobility training (Done)     Learning  Progress Summary           Patient Acceptance, E, VU by  at 4/2/2021 1736                               User Key     Initials Effective Dates Name Provider Type Discipline     06/19/19 -  Rissa Wang PT Physical Therapist PT              PT Recommendation and Plan  Planned Therapy Interventions (PT): balance training, bed mobility training, gait training, home exercise program, transfer training, stretching, strengthening, stair training, ROM (range of motion)  Plan of Care Reviewed With: patient  Outcome Summary: 59 y/o F POD 0 R TKA per Dr Cam. Patient is independent at baseline and does not typically use AD but does report history of antalgic gait and limited gait distance due to pain. Lives at home with spouse and 1+1 SHON her home with no steps inside. She was limited this date due to symptomatic hypotension in sitting. Not able to intiate transfer and ambulation. R knee ROM 32-90. Will initiate ambulation and TKA HEP tomorrow. Recommending HHPT upon d/c.     Time Calculation:   PT Charges     Row Name 04/02/21 1737             Time Calculation    Start Time  1640  -      Stop Time  1710  -      Time Calculation (min)  30 min  -      PT Received On  04/02/21  -      PT - Next Appointment  04/03/21  -      PT Goal Re-Cert Due Date  04/16/21  -         Time Calculation- PT    Total Timed Code Minutes- PT  0 minute(s)  -        User Key  (r) = Recorded By, (t) = Taken By, (c) = Cosigned By    Initials Name Provider Type     Rissa Wang PT Physical Therapist        Therapy Charges for Today     Code Description Service Date Service Provider Modifiers Qty    49128815897 HC PT EVAL MOD COMPLEXITY 4 4/2/2021 Rissa Wang, PT GP 1               Rissa Wang PT  4/2/2021      Electronically signed by Rissa Wang PT at 04/02/21 1738       [facility] SURGICAL INPATIENT  [address] [place] [address]  Phone:  [phone]  Fax:  [phone]  Date: Apr 3, 2021      Ambulatory Referral to Home Health     Patient:  More Staton MRN:  [id number]   [address]  [place] [address] :  1960  SSN:    Phone: [phone] Sex:  F      INSURANCE PAYOR PLAN GROUP # SUBSCRIBER ID   Primary:    [facility] [id number]      Referring Provider Information:  TARUN OLIVEIRA II Phone: [phone] Fax: [phone]      Referral Information:   # Visits:  1 Referral Type: Home Health [42]   Urgency:  Routine Referral Reason: Specialty Services Required   Start Date: Apr 3, 2021 End Date:  To be determined by Insurer   Diagnosis: Status post total right knee replacement (Z96.651 [ICD-10-CM] V43.65 [ICD-9-CM])      Refer to Dept:   Refer to Provider:   Refer to Facility:    Pt's address listed as [address] on facesheet. Address is [address], [place] [id number]  Face to Face Visit Date: 4/3/2021  Follow-up provider for Plan of Care? I will be treating the patient on an ongoing basis.  Please send me the Plan of Care for signature.  Follow-up provider: TARUN OLIVEIRA II [[id number]]  Reason/Clinical Findings: post hospital eval  Describe mobility limitations that make leaving home difficult: impaired mobility  Nursing/Therapeutic Services Requested: Physical Therapy (HHC to eval)  Nursing/Therapeutic Services Requested: Other  PT orders: Total joint pathway  Frequency: 1 Week 1     This document serves as a request of services and does not constitute Insurance authorization or approval of services.  To determine eligibility, please contact the members Insurance carrier to verify and review coverage.     If you have medical questions regarding this request for services. Please contact [facility] SURGICAL INPATIENT at [phone] during normal business hours.       Verbal Order Mode: Telephone with readback   Authorizing Provider: Tarun Oliveira II, MD  Authorizing Provider's NPI: [id number]    "  Order Entered By: Anjali Fiore RN 4/3/2021 11:22 AM     [facility] SURGICAL INPATIENT  [address] [place] [address]  Dept. Phone:  [phone]  Dept. Fax:  [phone] Date Ordered: Apr 3, 2021         Patient:  More Staton MRN:  [id number]   [address]  [place] [address] :  1960  SSN:    Phone: [phone] Sex:  F     Weight: 117 kg (257 lb 15 oz)         Ht Readings from Last 1 Encounters:   21 170.2 cm (67.01\")         Walker               (Order ID: [id number])    Diagnosis:  Status post total right knee replacement (Z96.651 [ICD-10-CM] V43.65 [ICD-9-CM])   Quantity:  1     Equipment:  Walker Folding with Wheels  Length of Need (99 Months = Lifetime): 99 Months = Lifetime        Authorizing Provider's Phone: [phone]   Verbal Order Mode: Telephone with readback   Authorizing Provider: Tarun Cam II, MD  Authorizing Provider's NPI: [id number]     Order Entered By: Anjali Fiore RN 4/3/2021 11:22 AM       "

## 2021-04-03 NOTE — DISCHARGE INSTRUCTIONS
Total Knee  Discharge Instructions  Dr. LA Cervantes” [place]  [phone]    • INCISION CARE  o Wash your hands prior to dressing changes  o KATHERINE Wound VAC: Postoperatively you had a KATHERINE Wound Vac placed on the incision. This was placed under sterile conditions in the operating room. It remains in place for 7 days postoperatively. After 7 days, the entire dressing must be removed, including all of the sticky adhesive. The dressing and battery pack provide gentle suction to the incision and provide several benefits over a traditional dressing:  - It maintains the sterile environment of the OR and reduces the risk of infection  - The suction removes unwanted buildup of blood/hematoma under the skin to reduce swelling  - The suction also promotes fresh blood supply to the skin and soft tissue to speed up healing  - The postoperative scar is reduced in size  - Showering is permitted immediately after surgery, but the battery pack must be protected or removed during the shower.   o After 7 days the KATHERIEN Wound Vac is removed. If there is no drainage, no dressing is required. If there is some scant drainage a dry bandage can be applied and changed daily until seen in the office or until the drainage stops.   o No creams or ointments to the incisions until 4 weeks post op.  o Do not touch or pick at the incision  o Check incision every day and notify surgeon immediately if any of the following signs or symptoms are seen:  - Increase in redness  - Increase in swelling around the incision and of the entire extremity  - Increase in pain  - NEW drainage or oozing from the incision  - Pulling apart of the edges of the incision  - Increase in overall body temperature (greater than 100.4 degrees)  o Zip-Line: your incision was closed with a state of the art device.   - Is a non-invasive and easy to use wound closure device that replaces sutures, staples and glue for surgical incisions  - It minimizes scarring and eliminating  “railroad” marks that come with staples or sutures  - It makes removal as atraumatic as peeling off a bandage  - Can be removed at home or by a physical therapist or nurse at 14 days postoperatively    • ACTIVITIES  o Exercises:  - Physical therapy will begin immediately while in the hospital. Patients going to a nursing home will get therapy as part of their care at the SNU/SNF facility. Patients going home may also have a therapist come to the house to help them mobilize until they can safely get to an outpatient therapy facility.  - Elevate the affected leg most of the day during the first week post operatively. Caution must be taken to avoid pillow placement directly under the heel of the leg, as this can cause pressure ulcers even with a soft pillow. All pillows and blankets should be placed underneath of the thigh and calf so that the heel is free-floating.  - Use cold packs for 20-30 minutes approximately 5 times per day.  - You should perform the daily stretching and strengthening exercises as taught by the therapist as often as possible. This can be done many times a day.  - Full weight bearing is allowed after surgery. It will be sore/painful to put weight on the leg, but this will help the bone to heal and prevent complications such as pneumonia, bed sores and blood clots. Mobilization is vital to the recovery process.  o Activities of Daily Living:  - No tub baths, hot tubs, or swimming pools for 4 weeks.  - May shower and let water run over the incision immediately after surgery. The battery pack of the KATHERINE Wound Vac must be protected or removed while in the shower. After the KATHERINE is removed 7 days after surgery showering is permitted as long as there is no drainage from the wound.     • Restrictions  o Weight: It is ok to allow full weight bearing after surgery. Weight on the leg actually quickens the recovery process. While it will be sore/painful to put weight on the leg, it is safe to do so. Hip  replacement after hip fracture has a much slower recover process. It can take months to heal fully from a hip fracture and patients even make some slow benefits up to a year afterwards.   o Driving: Many patients have questions about when it is safe to return to driving. The answer is that this is extremely variable. It depends on the extent of the surgery, as well as how quickly you heal. Certainly left leg surgeries make returning to driving easier while right leg surgeries require more extensive rehabilitation before driving can be safe. Until you can press down on the brake hard, and are off of all narcotics, driving is not permitted. Your surgeon cannot “clear” you to return to driving, only you can make the decision when you feel it is safe.    • Medications  o Anticoagulants: After upper extremity surgery most patients do not require an anticoagulant unless you have another injury that will be keeping you from mobilizing. Lower extremity surgery typically does require use of an anticoagulation medicine.   - IF YOU HAD LOWER EXTREMITY SURGERY AND ARE NOT DISCHARGED HOME WITH ANY ANTICOAGULANT MEDICINE YOU SHOULD TAKE ASPIRIN 325mg DAILY FOR 30 DAYS POSTOPERATIVELY.  - If you are discharged home with an anticoagulant such as Aspirin, Xarelto, Eliquis, Coumadin, or Lovenox, follow these simple instructions:   - Notify surgeon immediately if any viraj bleeding is noted in the urine, stool, emesis, or from the nose or the incision. Blood in the stool will often appear as black rather than red. Blood in urine may appear as pink. Blood in emesis may appear as brown/black like coffee grounds.  - You will need to apply pressure for longer periods of time to any cuts or abrasions to stop bleeding  - Avoid alcohol while taking anticoagulants  - Most anticoagulants are to be taken for 30 days postoperatively. After this time, you may stop using them unless instructed otherwise.   - If you were already taking an  anticoagulant (commonly Aspirin, Coumadin, or Plavix) you will likely be resuming your normal dose postoperatively and will be continuing that medication at the discretion of the prescribing physician.  o Stool Softeners: You will be at greater risk of constipation after surgery due to being less mobile and the pain medications.  - Take stool softeners as needed. Over the counter Colace 100 mg 1-2 capsules twice daily can be taken.  - If stools become too loose or too frequent, please decreases the dosage or stop the stool softener.  - If constipation occurs despite use of stool softeners, you are to continue the stool softeners and add a laxative (Milk of Magnesia 1 ounce daily as needed)  - Drink plenty of fluids, and eat fruits and vegetables during your recovery time. Getting up and mobilizing will help the bowels to recover their regular function, as will weaning off of all narcotics when the pain becomes tolerable.  o Pain Medications: Utilized after surgery are narcotics. This is some general information about these medications.  - CLASSIFICATION: Pain medications are called Opioids and are narcotics  - LEGALITIES: It is illegal to share narcotics with others  - DRIVING: it is illegal to drive while under the influence of narcotics. Doing so is a DUI.  - POTENTIAL SIDE EFFECTS: nausea, vomiting, itching, dizziness, drowsiness, dry mouth, constipation, and difficulty urinating.  - POTENTIAL ADVERSE EFFECTS:  - Opioid tolerance can develop with use of pain medications and this simply means that it requires more and more of the medication to control pain. However, this is seen more in patients that use opioids for longer periods of time.  - Opioid dependence can develop with use of Opioids. People with opioid dependence will experience withdrawal symptoms upon cessation of the medication.  - Opioid addiction can develop with use of Opioids. The incidence of this is very unlikely in patients who take the  medications as ordered and stop the medications as instructed.  - Opioid overdose can be dangerous, but is unlikely when the medication is taken as ordered and stopped when ordered. It is important not to mix opioids with alcohol as this can lead to over sedation and respiratory difficulty.  - DOSAGE:  - After the initial surgical pain begins to resolve, you may begin to decrease the pain medication. By the end of a few weeks, you should be off of pain medications.  - Refills will not be given by the office during evening hours, on weekends, or after 6 weeks post-op. You are responsible for weaning off of pain medication. You can increase the time between narcotic pills, taking one every 4 then 6 then 8 hours and so on.  - To seek refills on pain medications during the initial 6-week post-operative period, you must call the office to request the refill. The office will then notify you when to  the prescription. DO NOT wait until you are out of the medication to request a refill. Prescriptions will not be filled over the weekend and depending on the schedule, it may take a couple days for the prescription to be available. Someone will have to pick the prescription in person at the office.    • FOLLOW-UP VISITS  o You will need to follow up in the office with your surgeon in 3 weeks, or as instructed elsewhere in your discharge paperwork. Please call this number [phone] to schedule this appointment. If you are going to an SNF/SNU facility, they will arrange for you to follow up in the office.  o If you have any concerns or suspected complications prior to your follow up visit, please call the office. Do not wait until your appointment time if you suspect complications. These will need to be addressed in the office promptly.      Tarun Cam II, MD  Orthopaedic Surgery  [facility]

## 2021-04-03 NOTE — PLAN OF CARE
Goal Outcome Evaluation:         Had sit on the chair for couple hours yesterday evening. Pain and nausea are better. Ambulated to the bathroom with walker x 1 assist. Will continue to monitor.

## 2021-04-03 NOTE — THERAPY TREATMENT NOTE
Subjective: Pt agreeable to therapeutic plan of care.    Objective:     Bed mobility - Supervision  Transfers - Supervision and with rolling walker  Ambulation - 80 feet CGA and with rolling walker    Pain: 2 VAS  Education: Provided education on importance of mobility and skilled verbal / tactile cueing throughout intervention.     Assessment: More Staton presents with functional mobility impairments which indicate the need for skilled intervention. Tolerating session today without incident. Patient very motivated and has support from . Able to do 1 step on/off platform using rwx with vc's and cga. Performed RLE seated exs to incr knee flex/ext. ROM seated 5-95 deg and 10 deg ext lag.Will continue to follow and progress as tolerated.     Plan/Recommendations:   Pt would benefit from Home with family assist and and Home Health  at discharge from facility and requires rolling walker at discharge.   Pt desires Home with family assist and and Home Health at discharge. Pt cooperative; agreeable to therapeutic recommendations and plan of care.     Basic Mobility 6-click:  Rollin = Total, A lot = 2, A little = 3; 4 = None  Supine>Sit:   1 = Total, A lot = 2, A little = 3; 4 = None   Sit>Stand with arms:  1 = Total, A lot = 2, A little = 3; 4 = None  Bed>Chair:   1 = Total, A lot = 2, A little = 3; 4 = None  Ambulate in room:  1 = Total, A lot = 2, A little = 3; 4 = None  3-5 Steps with railin = Total, A lot = 2, A little = 3; 4 = None  Score: 23      Post-Tx Position: Up in Chair and Call light and personal items within reach  PPE: gloves, surgical mask, eyewear protection

## 2021-04-03 NOTE — PROGRESS NOTES
[facility]  [facility]     Patient Name: More Staton  MRN: [id number]  Today's Date: 4/3/2021    Admit Date: 4/2/2021      Discharge Plan     Row Name 04/03/21 1235       Plan    Plan  DC plan: home with [facility]. RW delivered to room from onsite DME closet provided by [facility].    Provided Post Acute Provider List?  Yes    Post Acute Provider List  Home Health    Delivered To  Patient    Method of Delivery  In person    Plan Comments  PT recommending [facility]. Provider list delivered. Pt preferred provider is [facility]. Referral sent via [facility] and called to Central Intake [phone]. Orders verified. Pt acceptance. Documented in referral pt's physical address is [address] [place] [address] [place] [address]. RW delivered to room from onsite DME closet provided by [facility]. Orders verified. Met with pt in room for less than 15 minutes wearing appropriate PPE including goggles and mask.        Continued Care and Services - Discharged on 4/3/2021 Admission date: 4/2/2021 - Discharge disposition: Home or Self Care    Durable Medical Equipment     Service Provider Request Status Selected Services Address Phone Fax Patient Preferred    [facility] [place]   Selected Durable Medical Equipment [address], [place] [address] [phone] --          Home Medical Care     Service Provider Request Status Selected Services Address Phone Fax Patient Preferred    [facility] [place]   Selected Home Health Services [address] [place] [address] [phone] --       Internal Comment last updated by Anjali Fiore RN 4/3/2021 1150    Pt home address:  [address] [place] [address] [place] [address]                         Expected Discharge Date and Time     Expected Discharge Date Expected Discharge Time    Apr 3, 2021         Anjali Fiore RN

## 2021-04-03 NOTE — OUTREACH NOTE
Prep Survey      Responses   [facility] facility patient discharged from?  [facility]   Is LACE score < 7 ?  Yes   Emergency Room discharge w/ pulse ox?  Yes   Eligibility  [facility]   Hospital  [facility]   Date of Admission  04/02/21   Date of Discharge  04/03/21   Discharge Disposition  Home or Self Care   Discharge diagnosis  TKA    Does the patient have one of the following disease processes/diagnoses(primary or secondary)?  Total Joint Replacement   Does the patient have Home health ordered?  No   Is there a DME ordered?  No   Prep survey completed?  Yes          Paige Delgado RN

## 2021-04-03 NOTE — DISCHARGE SUMMARY
Orthopaedic Discharge Summary  Dr. LA Cervantes” Dorina FARR  [phone]    NAME: More E Brionna PCP: Kerry Mayes MD   :  MRN: [id number]  [id number] LOS:  ADMIT: 0 days  2021   AGE/SEX: 60 y.o. female DC:  today             · Admitting Diagnosis: Knee osteoarthritis [M17.10]  · Total knee replacement status [Z96.659]    · Surgery Performed: [place] TOTAL KNEE ARTHROPLASTY [54354] (TOTAL KNEE ARTHROPLASTY)    · Discharge Medications:         Discharge Medications      New Medications      Instructions Start Date   aspirin  MG tablet   325 mg, Oral, Daily   Start Date: 2021     traMADol 50 MG tablet  Commonly known as: ULTRAM   50 mg, Oral, Every 4 Hours PRN         Continue These Medications      Instructions Start Date   azithromycin 250 MG tablet  Commonly known as: Zithromax Z-Shahid   Take 2 tablets the first day, then 1 tablet daily for 4 days.      Negrito-Mag 500-250 MG tablet  Generic drug: Calcium-Magnesium   1 tablet, Oral, Daily      cetirizine 10 MG tablet  Commonly known as: zyrTEC   TAKE ONE TABLET BY MOUTH DAILY      clindamycin 300 MG capsule  Commonly known as: CLEOCIN   300 mg, Oral, 3 Times Daily      doxycycline 100 MG capsule  Commonly known as: VIBRAMYCIN   100 mg, Oral, 2 Times Daily      levothyroxine 112 MCG tablet  Commonly known as: SYNTHROID, LEVOTHROID   112 mcg, Oral, Daily, Take dos      ondansetron ODT 4 MG disintegrating tablet  Commonly known as: Zofran ODT   4 mg, Translingual, Every 8 Hours PRN      promethazine-dextromethorphan 6.25-15 MG/5ML syrup  Commonly known as: PROMETHAZINE-DM   5 mL, Oral, Nightly PRN      SM Vitamin D3 100 MCG (4000 UT) capsule  Generic drug: Cholecalciferol   1 capsule, Oral, Every 24 Hours      vitamin b complex capsule capsule   1 capsule, Oral, Daily             · Vitals:     Vitals:    21 2200 21 0045 21 0532 21 0821   BP: 104/62 114/62 142/64 116/59   BP Location: Right arm Right arm Left arm Left arm    Patient Position: Lying Lying Lying Sitting   Pulse: 72 78 72 68   Resp: 16 14 14 18   Temp: 97.5 °F (36.4 °C) 98.1 °F (36.7 °C) 97.1 °F (36.2 °C) 97.8 °F (36.6 °C)   TempSrc: Oral Oral Oral Oral   SpO2: 98% 95% 94% 96%   Weight:       Height:           · Labs:      Admission on 04/02/2021   Component Date Value Ref Range Status   • Glucose 04/03/2021 121* 65 - 99 mg/dL Final   • BUN 04/03/2021 17  8 - 23 mg/dL Final   • Creatinine 04/03/2021 0.91  0.57 - 1.00 mg/dL Final   • Sodium 04/03/2021 136  136 - 145 mmol/L Final   • Potassium 04/03/2021 4.2  3.5 - 5.2 mmol/L Final   • Chloride 04/03/2021 106  98 - 107 mmol/L Final   • CO2 04/03/2021 21.0* 22.0 - 29.0 mmol/L Final   • Calcium 04/03/2021 8.3* 8.6 - 10.5 mg/dL Final   • eGFR Non African Amer 04/03/2021 63  >60 mL/min/1.73 Final   • BUN/Creatinine Ratio 04/03/2021 18.7  7.0 - 25.0 Final   • Anion Gap 04/03/2021 9.0  5.0 - 15.0 mmol/L Final   • WBC 04/03/2021 13.00* 3.40 - 10.80 10*3/mm3 Final   • RBC 04/03/2021 3.42* 3.77 - 5.28 10*6/mm3 Final   • Hemoglobin 04/03/2021 9.8* 12.0 - 15.9 g/dL Final   • Hematocrit 04/03/2021 29.8* 34.0 - 46.6 % Final   • MCV 04/03/2021 87.1  79.0 - 97.0 fL Final   • MCH 04/03/2021 28.6  26.6 - 33.0 pg Final   • MCHC 04/03/2021 32.8  31.5 - 35.7 g/dL Final   • RDW 04/03/2021 14.1  12.3 - 15.4 % Final   • RDW-SD 04/03/2021 43.3  37.0 - 54.0 fl Final   • MPV 04/03/2021 8.9  6.0 - 12.0 fL Final   • Platelets 04/03/2021 206  140 - 450 10*3/mm3 Final        No results found.    · Hospital Course:   60 y.o. female was admitted to [facility] to services of Tarun Cam II, MD with Knee osteoarthritis [M17.10]  Total knee replacement status [Z96.659] on 4/2/2021 and underwent [place] TOTAL KNEE ARTHROPLASTY [50120] (TOTAL KNEE ARTHROPLASTY). Post-operatively the patient transferred to the floor where the patient underwent mobilization therapy. Opioids were titrated to achieve appropriate pain management to allow for  "participation in mobilization exercises. Vital signs and laboratory values are now within safe parameters for discharge. The dressings and/or incision is intact without signs or symptoms of active infection. Operative extremity neurovascular status remains intact as compared to the preoperative exam. Appropriate education re: incision care, activity levels, medications, and follow up visits was completed and all questions were answered. The patient is now deemed stable for discharge.    HOME: The patient progressed well with physical therapy. There were cleared for discharge to home. The patietn was sent home in good condition}.       R \"Osito\" Dorina FARR MD  Orthopaedic Surgery  [facility]  [phone]                                               "

## 2021-04-05 ENCOUNTER — TRANSITIONAL CARE MANAGEMENT TELEPHONE ENCOUNTER (OUTPATIENT)
Dept: CALL CENTER | Facility: HOSPITAL | Age: 61
End: 2021-04-05

## 2021-04-05 NOTE — PROGRESS NOTES
Case Management Discharge Note      Final Note: home with [facility]    Provided Post Acute Provider List?: Yes  Post Acute Provider List: Home Health  Delivered To: Patient  Method of Delivery: In person    Selected Continued Care - Discharged on 4/3/2021 Admission date: 4/2/2021 - Discharge disposition: Home or Self Care             Durable Medical Equipment     Service Provider Selected Services Address Phone Fax Patient Preferred    [facility] - [place]  Durable Medical Equipment [address], [place] [id number] [phone] --                   Home Medical Care     Service Provider Selected Services Address Phone Fax Patient Preferred    [facility] [place]  Home Health Services [address] [address] [phone] --       Internal Comment last updated by Anjali Fiore, RN 4/3/2021 1150    Pt home address:  [address] [place] [id number]                               Final Discharge Disposition Code: 06 - home with home health care

## 2021-04-05 NOTE — OUTREACH NOTE
Call Center TCM Note      Responses   [facility] patient discharged from?  [facility]   Does the patient have one of the following disease processes/diagnoses(primary or secondary)?  Total Joint Replacement   Joint surgery performed?  Knee   TCM attempt successful?  Yes   Call start time  1048   Call end time  1052   Discharge diagnosis  TKA    Does the patient have all medications related to this admission filled (includes all antibiotics, pain medications, etc.)  Yes   Is the patient taking all medications as directed (includes completed medication regime)?  Yes   Is the patient able to teach back alternate methods of pain control?  Ice, Knee-elevation/no pillow under knee, Correct alignment, Short, frequent activity   Comments regarding appointments  Hospital follow up with Surgeon Dr. Cam 4/28/21 @ 3:30pm   Does the patient have a follow up appointment with their surgeon?  Yes   Has the patient kept scheduled appointments due by today?  N/A   Psychosocial issues?  No   Has the patient fallen since discharge?  No   Did the patient receive a copy of their discharge instructions?  Yes   Nursing interventions  Reviewed instructions with patient   What is the patient's perception of their functional status since discharge?  Improving   Is the patient able to teach back signs and symptoms of infection?  Temp >100.4 for 24h or longer, Incisional drainage, Blisters around incision, Increased swelling or redness around incision (not associated with surgical edema), Severe discomfort or pain, Changes in mobility, Shortness of breath or chest pain   Is the patient able to teach back how to prevent infection?  Check incision daily, Wash hands before and after touching incision, Keep incision covered if drainage, Keep incision covered if pets in house, Shower only as directed by surgeon, Eat well-balanced diet, No tub baths, hot tub or swimming   Is the patient able to teach back signs and symptoms of DVT?  Redness in  "calf, Area hot to touch, Shortness of breath or chest pain, Swelling in calf, Severe pain in calf   If the patient is a current smoker, are they able to teach back resources for cessation?  Not a smoker   Is the patient/caregiver able to teach back the hierarchy of who to call/visit for symptoms/problems? PCP, Specialist, Home health nurse, Urgent Care, ED, 911  Yes   TCM call completed?  Yes   Wrap up additional comments  Patient states she is doing okay. She contacted MD office due to Tramadol not effective for pain relief. Pt. waiting on notification from Pharmacy on new prescription for pain control. Patient using ice machine to knee which pt. reports \"is her best friend\" is providing relief.  Patient report she has received physical therapy since D/C.  No questions or concerns voiced.           Lisa Robles RN    4/5/2021, 10:59 EDT      "

## 2021-04-15 ENCOUNTER — TRANSCRIBE ORDERS (OUTPATIENT)
Dept: PHYSICAL THERAPY | Facility: CLINIC | Age: 61
End: 2021-04-15

## 2021-04-19 ENCOUNTER — TREATMENT (OUTPATIENT)
Dept: PHYSICAL THERAPY | Facility: CLINIC | Age: 61
End: 2021-04-19

## 2021-04-19 DIAGNOSIS — Z96.651 STATUS POST TOTAL RIGHT KNEE REPLACEMENT: Primary | ICD-10-CM

## 2021-04-19 PROCEDURE — G0283 ELEC STIM OTHER THAN WOUND: HCPCS | Performed by: PHYSICAL THERAPIST

## 2021-04-19 PROCEDURE — 97161 PT EVAL LOW COMPLEX 20 MIN: CPT | Performed by: PHYSICAL THERAPIST

## 2021-04-19 PROCEDURE — 97110 THERAPEUTIC EXERCISES: CPT | Performed by: PHYSICAL THERAPIST

## 2021-04-19 NOTE — PROGRESS NOTES
Physical Therapy Initial Evaluation and Plan of Care    Patient: More Staton   : 1960  Diagnosis/ICD-10 Code:  Status post total right knee replacement [Z96.651]  Referring practitioner: Tarun Cam*  Date of Initial Visit: 2021  Today's Date: 2021  Patient seen for 1 sessions           Subjective Questionnaire: Oxford knee: 29/48 x 100 = 60% disability      Subjective Evaluation    History of Present Illness  Date of surgery: 2021  Mechanism of injury: Arthritis/DJD in R Knee.     Subjective comment: 61 y/o female s/p R TKR - [facility] w F and then referral to OP-PT for continuation of care. C/o pain R knee  and ankle with walking; swelling; decreased movement of knee; using walker for ambualtion. doing exerices per [facility].  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 Quality of life: good    Pain  Current pain ratin  At best pain ratin  At worst pain ratin  Location: R knee and ankle.  Quality: discomfort, dull ache, sharp and radiating  Relieving factors: ice and rest  Aggravating factors: ambulation, squatting, sleeping, standing and movement  Progression: improved    Social Support  Lives in: one-story house (2 steps)  Lives with: spouse (daughter helps patient.)    Hand dominance: right    Patient Goals  Patient goals for therapy: decreased pain, decreased edema, increased motion and return to work  Patient goal: walk normal           Objective          Tenderness     Additional Tenderness Details  Diffuse tenderness soft tissue R knee     Neurological Testing     Sensation     Knee   Left Knee   Intact: light touch    Right Knee   Intact: light touch     Additional Neurological Details  Incisional numbness R knee    Active Range of Motion   Left Knee   Flexion: 135 degrees   Extension: 7 degrees     Right Knee   Flexion: 98 degrees with pain  Extension: 20 degrees with pain    Passive Range of Motion      Right Knee   Flexion: 101 degrees   Extension: 14 degrees     Patellar Mobility   Left Knee Patellar tendons within functional limits include the medial, lateral, superior and inferior.     Right Knee Hypomobile in the medial, lateral, superior and inferior patellar tendon(s).     Strength/Myotome Testing     Left Knee   Flexion: 5  Extension: 5  Quadriceps contraction: good    Right Knee   Flexion: 3+  Extension: 3+  Quadriceps contraction: poor    Swelling     Left Knee Girth Measurement (cm)   Joint line: 45 cm    Right Knee Girth Measurement (cm)   Joint line: 48 cm    Ambulation   Weight-Bearing Status   Weight-Bearing Status (Right): weight-bearing as tolerated   Assistive device used: two-wheeled walker    Ambulation: Level Surfaces   Ambulation with assistive device: independent    Observational Gait   Gait: antalgic and asymmetric   Increased left stance time. Decreased walking speed, stride length and right stance time.   Base of support: increased      Access Code: [email or web address]  URL: [email or web address]  Date: 04/19/2021  Prepared by: Ken Juarez    Exercises  Supine Knee Extension w/ towel - 2 x daily - 7 x weekly - 1 sets - 2 reps - 2 min hold      Assessment & Plan     Assessment  Impairments: abnormal gait, abnormal muscle tone, abnormal or restricted ROM, activity intolerance, impaired physical strength, lacks appropriate home exercise program, pain with function, safety issue and weight-bearing intolerance  Assessment details: Pt presents to PT with symptoms consistent with R TKR.  She exhibits pain; decreased ROM; impaired gait; weakness; swelling. Pt would benefit from skilled PT interventions to address the deficits noted and has the potential to improve in response to therapy.             Barriers to therapy: obesity; pain; limited L knee extension.  Prognosis: good  Functional Limitations: lifting, walking, uncomfortable because of pain and standing  Goals  Plan Goals: SHORT TERM  GOALS: Time for Goal Achievement:  6 visits    1.  Patient to be compliant with her Initial HEP.   2.  Pt able to ascend/descend steps and transfer with less knee pain < 5/10  3.  Pt can tolerate 10 min warm up on Nustep and full revolutions on bike.  4.  Pt. to exhibit increased LE endurance/strength to 4/5 to allow for increased ease with sit-stand and standing/walking > 30 minutes.    LONG TERM GOALS: Time for Goal Achievement: 12 visits  1.  Pt to have significant improvement on perceived disability score/outcome measure.  2.  Patient able to ascend/descend steps and prolonged standing with pain < 2/10.  3.  Pt to exhibit full knee AROM to allow for kneeling, bending squatting as is necessary for ADL's, IADL's and household activities.   4.  Pt to exhibit LE endurance/strength to 5/5 to allow for walking, steps and transfers to occur safely.  5.  Pt to demonstrate increased stability of the knee to balance on foam as needed to traverse uneven terrain .          Plan  Therapy options: will be seen for skilled physical therapy services  Planned modality interventions: cryotherapy, electrical stimulation/German stimulation, ultrasound and thermotherapy (hydrocollator packs)  Planned therapy interventions: manual therapy, neuromuscular re-education, soft tissue mobilization, strengthening, stretching, therapeutic activities, home exercise program, gait training, functional ROM exercises, flexibility, joint mobilization and balance/weight-bearing training  Frequency: 2x week  Duration in visits: 12  Treatment plan discussed with: patient        History # of Personal Factors and/or Comorbidities: LOW (0)  Examination of Body System(s): # of elements: MODERATE (3)  Clinical Presentation: EVOLVING  Clinical Decision Making: MODERATE      Timed:         Manual Therapy:         mins  57793;     Therapeutic Exercise:    15     mins  65374;     Neuromuscular Mili:        mins  77383;    Therapeutic Activity:          mins   32002;     Gait Training:           mins  40311;     Ultrasound:          mins  68120;    Ionto                                   mins   04804  Self Care                            mins   96458  Aquatic                               mins 10490      Un-Timed:  Electrical Stimulation:    15     mins  18019 ( );  Dry Needling          mins self-pay  Traction          mins 23240  Low Eval      30    Mins  53699  Mod Eval          Mins  01331  High Eval                            Mins  38242  Re-Eval                               mins  22443        Timed Treatment:   15   mins   Total Treatment:     60   mins    PT SIGNATURE: Michi Juarez, OMARI   DATE TREATMENT INITIATED: 4/19/2021    Initial Certification  Certification Period: 7/18/2021  I certify that the therapy services are furnished while this patient is under my care.  The services outlined above are required by this patient, and will be reviewed every 90 days.     PHYSICIAN: Tarun Cam II, MD      DATE:     Please sign and return via fax to [phone].. Thank you, [facility].

## 2021-04-21 ENCOUNTER — TREATMENT (OUTPATIENT)
Dept: PHYSICAL THERAPY | Facility: CLINIC | Age: 61
End: 2021-04-21

## 2021-04-21 DIAGNOSIS — Z96.651 STATUS POST TOTAL RIGHT KNEE REPLACEMENT: Primary | ICD-10-CM

## 2021-04-21 PROCEDURE — 97110 THERAPEUTIC EXERCISES: CPT | Performed by: PHYSICAL THERAPIST

## 2021-04-21 PROCEDURE — G0283 ELEC STIM OTHER THAN WOUND: HCPCS | Performed by: PHYSICAL THERAPIST

## 2021-04-21 NOTE — PROGRESS NOTES
3 minPhysical Therapy Daily Progress Note    VISIT#: 2    Subjective   More Staton reports: increased pain with weather change.      Objective     See Exercise, Manual, and Modality Logs for complete treatment.     Patient Education:    Assessment/Plan - increased pain today with poor mobility; reviewed HEP. Stressed importance of regaining knee extension. Patient has floor cycle machine - advised to begin using x 10- 15 min daily for mobility.       Progress per Plan of Care            Timed:         Manual Therapy:     5    mins  73964;     Therapeutic Exercise:    40     mins  80371;     Neuromuscular Mili:        mins  18291;    Therapeutic Activity:          mins  59835;     Gait Training:           mins  66258;     Ultrasound:          mins  68754;    Ionto                                   mins   22414  Self Care                            mins   18293  Canalith Repos                   mins  4209  Aquatic                               mins 33750    Un-Timed:  Electrical Stimulation:    15     mins  35111 ( );  Dry Needling         mins self-pay  Traction          mins 43766  Low Eval          Mins  66979  Mod Eval          Mins  78532  High Eval                            Mins  92841  Re-Eval                               mins  29545    Timed Treatment:  45   mins   Total Treatment:      60  mins    Michi Juarez PT

## 2021-04-27 ENCOUNTER — TREATMENT (OUTPATIENT)
Dept: PHYSICAL THERAPY | Facility: CLINIC | Age: 61
End: 2021-04-27

## 2021-04-27 DIAGNOSIS — Z96.651 STATUS POST TOTAL RIGHT KNEE REPLACEMENT: Primary | ICD-10-CM

## 2021-04-27 PROCEDURE — 97110 THERAPEUTIC EXERCISES: CPT | Performed by: PHYSICAL THERAPIST

## 2021-04-27 PROCEDURE — G0283 ELEC STIM OTHER THAN WOUND: HCPCS | Performed by: PHYSICAL THERAPIST

## 2021-04-27 NOTE — PROGRESS NOTES
3 minPhysical Therapy Daily Progress Note    VISIT#: 3    Subjective   More Brionna reports: To see MD tomorrow; knee very restricted this morning  - tight and painful; took tramadol this morning. Unable to use floor cycle due to pain and device scooting on floor. Overall, pain level decreased and is walking better,    Pain level: least 2/10 most 5/10 - improving most of time.  Date of surgery: 4/2/2021    Objective   Observation: mild erythema medial knee.   PROM: 14 to 100 degrees  AROM: 18 to 98 degrees  Palpation: very tender lateral aspect of knee with flexion  Swelling: R 47.5 cm; L 45.0 cm  Poor quad contraction w/ inhibition    See Exercise, Manual, and Modality Logs for complete treatment.     Patient Education: reviewed use of floor cycle: suggested blocking with wall and adjusting chair height. Trial of Scifit x 4 min: unable to complete full cycle.     Assessment/Plan - increased pain today with poor mobility; reviewed HEP. Stressed importance of regaining knee extension. Gait training with cane to assist with transition from Walker. Mild redness medial knee with slight increase in pain today.      Progress per Plan of Care - please assess redness and mobility.             Timed:         Manual Therapy:     5    mins  88504;     Therapeutic Exercise:    40     mins  88621;     Neuromuscular Mili:        mins  44569;    Therapeutic Activity:          mins  31944;     Gait Training:           mins  60280;     Ultrasound:          mins  23489;    Ionto                                   mins   43866  Self Care                            mins   73876  Canalith Repos                   mins  4209  Aquatic                               mins 41529    Un-Timed:  Electrical Stimulation:    15     mins  72168 ( );  Dry Needling         mins self-pay  Traction          mins 43023  Low Eval          Mins  41975  Mod Eval          Mins  66209  High Eval                            Mins  26932  Re-Eval                                mins  64723    Timed Treatment:  45   mins   Total Treatment:      60  mins    Michi Juarez, PT

## 2021-04-29 ENCOUNTER — TREATMENT (OUTPATIENT)
Dept: PHYSICAL THERAPY | Facility: CLINIC | Age: 61
End: 2021-04-29

## 2021-04-29 DIAGNOSIS — Z96.651 STATUS POST TOTAL RIGHT KNEE REPLACEMENT: Primary | ICD-10-CM

## 2021-04-29 PROCEDURE — 97110 THERAPEUTIC EXERCISES: CPT | Performed by: PHYSICAL THERAPIST

## 2021-04-29 NOTE — PROGRESS NOTES
3 minPhysical Therapy Daily Progress Note    VISIT#: 4    Subjective   More Staton reports: MD pleased with knee: does not appear infected and advised to continue PT.     Pain level: least 2/10 most 5/10 - improving most of time.  Date of surgery: 4/2/2021    Objective   Observation: mild erythema medial knee.   PROM: 14 to 100 degrees  AROM: 18 to 98 degrees  Palpation: very tender lateral aspect of knee with flexion  Swelling: R 47.5 cm; L 45.0 cm  Poor quad contraction w/ inhibition    See Exercise, Manual, and Modality Logs for complete treatment.     Patient Education: reviewed use of floor cycle: suggested blocking with wall and adjusting chair height. Trial of Scifit x 4 min: unable to complete full cycle.     Assessment/Plan - introduced reverse gait to facilitate knee extension; weaned off of IFC; pain less; improved mobility with cane.    Progress per Plan of Care           Timed:         Manual Therapy:        mins  98994;     Therapeutic Exercise:    45     mins  72063;     Neuromuscular Mili:        mins  96146;    Therapeutic Activity:          mins  59055;     Gait Training:           mins  77963;     Ultrasound:          mins  83956;    Ionto                                   mins   33715  Self Care                            mins   71712  Canalith Repos                   mins  4209  Aquatic                               mins 72822    Un-Timed:  Electrical Stimulation:        mins  49749 ( );  Dry Needling         mins self-pay  Traction          mins 90982  Low Eval          Mins  41342  Mod Eval          Mins  12397  High Eval                            Mins  96939  Re-Eval                               mins  35558    Timed Treatment:  45   mins   Total Treatment:      45  mins    Michi Juarez PT

## 2021-05-03 ENCOUNTER — TREATMENT (OUTPATIENT)
Dept: PHYSICAL THERAPY | Facility: CLINIC | Age: 61
End: 2021-05-03

## 2021-05-03 DIAGNOSIS — Z96.651 STATUS POST TOTAL RIGHT KNEE REPLACEMENT: Primary | ICD-10-CM

## 2021-05-03 PROCEDURE — 97110 THERAPEUTIC EXERCISES: CPT | Performed by: PHYSICAL THERAPIST

## 2021-05-03 PROCEDURE — 97140 MANUAL THERAPY 1/> REGIONS: CPT | Performed by: PHYSICAL THERAPIST

## 2021-05-03 NOTE — PROGRESS NOTES
3 minPhysical Therapy Daily Progress Note    VISIT#: 5    Subjective   More Staton reports: pain up and down in knee. Increase in pain with rain.    Pain level: least 2/10 most 5/10 - improving most of time.  Date of surgery: 4/2/2021    Objective   Observation: mild erythema medial knee.   PROM: 14 to 100 degrees  AROM: 18 to 98 degrees  Palpation: very tender lateral aspect of knee with flexion  Swelling: R 47.5 cm; L 45.0 cm  Poor quad contraction w/ inhibition    See Exercise, Manual, and Modality Logs for complete treatment.     Patient Education:     Assessment/Plan - used PT to facilitate knee flexion on sliding board during heel slides with end-range stretch: was experiencing catching sensation prior to assist. Also, focused on patellar mobs to encourage mobility.       Progress per Plan of Care           Timed:         Manual Therapy:    10    mins  87398;     Therapeutic Exercise:    45     mins  95671;     Neuromuscular Mili:        mins  52057;    Therapeutic Activity:          mins  03492;     Gait Training:           mins  58819;     Ultrasound:          mins  39681;    Ionto                                   mins   19399  Self Care                            mins   72231  Canalith Repos                   mins  4209  Aquatic                               mins 33757    Un-Timed:  Electrical Stimulation:        mins  85164 ( );  Dry Needling         mins self-pay  Traction          mins 40801  Low Eval          Mins  17405  Mod Eval          Mins  22464  High Eval                            Mins  75161  Re-Eval                               mins  04266    Timed Treatment:  55   mins   Total Treatment:      55  mins    Michi Juarez PT

## 2021-05-05 ENCOUNTER — TREATMENT (OUTPATIENT)
Dept: PHYSICAL THERAPY | Facility: CLINIC | Age: 61
End: 2021-05-05

## 2021-05-05 DIAGNOSIS — Z96.651 STATUS POST TOTAL RIGHT KNEE REPLACEMENT: Primary | ICD-10-CM

## 2021-05-05 PROCEDURE — 97110 THERAPEUTIC EXERCISES: CPT | Performed by: PHYSICAL THERAPIST

## 2021-05-05 NOTE — PROGRESS NOTES
3 minPhysical Therapy Daily Progress Note    VISIT#: 6    Subjective   More Staton reports: stiff today - may be rain; using floor cycle regularly.     Pain level: least 2/10 most 5/10 - improving most of time.  Date of surgery: 4/2/2021    Objective   Observation: mild erythema medial knee.   PROM: 14 to 100 degrees  AROM: 18 to 98 degrees  Palpation: very tender lateral aspect of knee with flexion  Swelling: R 47.5 cm; L 45.0 cm  Poor quad contraction w/ inhibition    See Exercise, Manual, and Modality Logs for complete treatment.     Patient Education:     Assessment/Plan - increased stiffness in knee but loosened up with stretching and exercise.    Progress per Plan of Care           Timed:         Manual Therapy:    3    mins  94294;     Therapeutic Exercise:    43     mins  20057;     Neuromuscular Mili:        mins  55489;    Therapeutic Activity:          mins  20559;     Gait Training:           mins  72444;     Ultrasound:          mins  65606;    Ionto                                   mins   61692  Self Care                            mins   75263  Canalith Repos                   mins  4209  Aquatic                               mins 61260    Un-Timed:  Electrical Stimulation:        mins  68865 ( );  Dry Needling         mins self-pay  Traction          mins 65974  Low Eval          Mins  32001  Mod Eval          Mins  48958  High Eval                            Mins  65280  Re-Eval                               mins  35162    Timed Treatment:  46   mins   Total Treatment:     46  mins    Michi Juarez PT

## 2021-05-13 ENCOUNTER — TREATMENT (OUTPATIENT)
Dept: PHYSICAL THERAPY | Facility: CLINIC | Age: 61
End: 2021-05-13

## 2021-05-13 DIAGNOSIS — Z96.651 STATUS POST TOTAL RIGHT KNEE REPLACEMENT: Primary | ICD-10-CM

## 2021-05-13 PROCEDURE — 97110 THERAPEUTIC EXERCISES: CPT | Performed by: PHYSICAL THERAPIST

## 2021-05-13 NOTE — PROGRESS NOTES
3 minPhysical Therapy Daily Progress Note    VISIT#: 7    Subjective   More Staton reports: doing floor cycling and HEP consistently. Feeling stronger in knee and walking better.    Pain level: mild pain today 1/10  Date of surgery: 4/2/2021    Objective   Observation: mild erythema medial knee.   PROM:   AROM:   See Exercise, Manual, and Modality Logs for complete treatment.     Patient Education: heel slide still painful - advised to use ball as in clinic to facilitate movement.     Assessment/Plan - Able to transition to RCB - improved gait and less pain today. Advanced exercises in clinic.    Progress per Plan of Care           Timed:         Manual Therapy:      mins  70255;     Therapeutic Exercise:    45     mins  87693;     Neuromuscular Mili:        mins  69033;    Therapeutic Activity:          mins  95691;     Gait Training:           mins  18243;     Ultrasound:          mins  89342;    Ionto                                   mins   58326  Self Care                            mins   64158  Canalith Repos                   mins  4209  Aquatic                               mins 78732    Un-Timed:  Electrical Stimulation:        mins  01216 ( );  Dry Needling         mins self-pay  Traction          mins 59189  Low Eval          Mins  99070  Mod Eval          Mins  63327  High Eval                            Mins  86793  Re-Eval                               mins  93534    Timed Treatment:  45   mins   Total Treatment:     45  mins    Michi Juarez, PT

## 2021-05-17 ENCOUNTER — TREATMENT (OUTPATIENT)
Dept: PHYSICAL THERAPY | Facility: CLINIC | Age: 61
End: 2021-05-17

## 2021-05-17 DIAGNOSIS — Z96.651 STATUS POST TOTAL RIGHT KNEE REPLACEMENT: Primary | ICD-10-CM

## 2021-05-17 PROCEDURE — 97110 THERAPEUTIC EXERCISES: CPT | Performed by: PHYSICAL THERAPIST

## 2021-05-19 ENCOUNTER — TREATMENT (OUTPATIENT)
Dept: PHYSICAL THERAPY | Facility: CLINIC | Age: 61
End: 2021-05-19

## 2021-05-19 DIAGNOSIS — Z96.651 STATUS POST TOTAL RIGHT KNEE REPLACEMENT: Primary | ICD-10-CM

## 2021-05-19 PROCEDURE — 97110 THERAPEUTIC EXERCISES: CPT | Performed by: PHYSICAL THERAPIST

## 2021-05-19 NOTE — PROGRESS NOTES
Physical Therapy Daily Progress Note    VISIT#: 9    Subjective   More Staton reports: she saw an x-ray on 4/2/21 that said she could have a fracture at the medial tibial plateau. 50% better - walking in home without cane sometimes. Plans to return to work in July 5th.    Pain level: mild pain today 1/10  Date of surgery: 4/2/2021  Next MD appt: 7/27/21    Objective   X- ray 4/2/21  Impression:  Findings of a periprosthetic fracture at the medial tibial plateau.  Cemented total knee arthroplasty.  Immediate postoperative appearance.  Observation: mild erythema medial knee.   PROM:   AROM:   See Exercise, Manual, and Modality Logs for complete treatment.     Patient Education:      Assessment/Plan - advised to discuss with MD on next f/u but re-assured her that she is progressing and gait is improving. Knee ROM improving, but requires verbal cues to correct gait.    Progress per Plan of Care - re-assess next visit.           Timed:         Manual Therapy:      mins  39505;     Therapeutic Exercise:    45     mins  95615;     Neuromuscular Mili:        mins  69773;    Therapeutic Activity:          mins  17731;     Gait Training:           mins  39077;     Ultrasound:          mins  70948;    Ionto                                   mins   65423  Self Care                            mins   24797  Canalith Repos                   mins  4209  Aquatic                               mins 49052    Un-Timed:  Electrical Stimulation:        mins  63962 ( );  Dry Needling         mins self-pay  Traction          mins 08015  Low Eval          Mins  30236  Mod Eval          Mins  62546  High Eval                            Mins  28422  Re-Eval                               mins  14608    Timed Treatment:  45   mins   Total Treatment:     45  mins    Michi Juarez PT

## 2021-05-24 ENCOUNTER — TREATMENT (OUTPATIENT)
Dept: PHYSICAL THERAPY | Facility: CLINIC | Age: 61
End: 2021-05-24

## 2021-05-24 DIAGNOSIS — Z96.651 STATUS POST TOTAL RIGHT KNEE REPLACEMENT: Primary | ICD-10-CM

## 2021-05-24 PROCEDURE — 97110 THERAPEUTIC EXERCISES: CPT | Performed by: PHYSICAL THERAPIST

## 2021-05-24 PROCEDURE — 97140 MANUAL THERAPY 1/> REGIONS: CPT | Performed by: PHYSICAL THERAPIST

## 2021-05-24 NOTE — PROGRESS NOTES
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 Stiffness still a problem with knee.     Surgery: 21 - ~7 weeks post op    Subjective Questionnaire:  Oxford knee: 29/48 x 100 = 60% disability at exam; 35% disability currently    Clinical Progress: improved  Home Program Compliance: Yes  Treatment has included: therapeutic exercise, manual therapy, therapeutic activity, gait training, electrical stimulation and cryotherapy    Subjective   Objective          Tenderness     Right Knee   Tenderness in the medial retinaculum.     Additional Tenderness Details  Diffuse tenderness soft tissue R knee     Neurological Testing     Sensation     Knee   Left Knee   Intact: light touch    Right Knee   Intact: light touch     Additional Neurological Details  Incisional numbness R knee    Active Range of Motion   Left Knee   Flexion: 135 degrees   Extension: 7 degrees     Right Knee   Flexion: 98 degrees with pain  Extension: 20 degrees with pain    Passive Range of Motion     Right Knee   Flexion: 101 degrees   Extension: 14 degrees     Patellar Mobility   Left Knee Patellar tendons within functional limits include the medial, lateral, superior and inferior.     Right Knee Hypomobile in the medial, lateral, superior and inferior patellar tendon(s).     Strength/Myotome Testing     Left Knee   Flexion: 5  Extension: 5  Quadriceps contraction: good    Right Knee   Flexion: 4-  Extension: 4-  Quadriceps contraction: fair    Swelling     Left Knee Girth Measurement (cm)   Joint line: 45  cm    Right Knee Girth Measurement (cm)   Joint line: 48 cm    Ambulation   Weight-Bearing Status   Weight-Bearing Status (Right): weight-bearing as tolerated   Assistive device used: single point cane    Ambulation: Level Surfaces   Ambulation with assistive device: independent    Observational Gait   Gait: antalgic and asymmetric   Increased left stance time. Decreased walking speed, stride length and right stance time.   Base of support: increased    Additional Observational Gait Details  Decreased knee flexion and extension during gait cycle      Assessment & Plan     Assessment  Assessment details: More has shown improvement but still lacks appropriate ROM both passively and actively; exhibits impaired gait mechanics (transitined to cane); and weakness; pain ranges from 0 to 4/10.    Recommend continuing PT:will add 6 visits to POC - if patient does not continue to respond to conservative interventions, will suggest return to MD for re-assess.      Prognosis: fair      Progress toward previous goals: Partially Met      Recommendations: Continue with recommendations  add 6 visits to POC.  Timeframe: 3 months  Prognosis to achieve goals: fair    PT Signature: Michi Juarez, PT    Rx: manual performed Carloz Cool PT, DPT    Based upon review of the patient's progress and continued therapy plan, it is my medical opinion that More Staton should continue physical therapy treatment at [facility]  [address] [place] [address] [place] [address] [phone].    Signature: __________________________________  Tarun Cam II, MD    Timed:         Manual Therapy:     10   mins  58464;     Therapeutic Exercise:    40     mins  45338;     Neuromuscular Mili:        mins  89781;    Therapeutic Activity:         mins  16302;     Gait Training:           mins  95957;     Ultrasound:          mins  94045;    Ionto                                   mins    10304  Self Care                           mins   91462  Aquatic                               mins 64429      Un-Timed:  Electrical Stimulation:         mins  74670 ( );  Dry Needling          mins self-pay  Traction         mins 81845  Low Eval          Mins  93833  Mod Eval          Mins  07276  High Eval                           Mins  78333  Re-Eval                               mins  02827      Timed Treatment:   50   mins   Total Treatment:     50  mins

## 2021-05-26 ENCOUNTER — TREATMENT (OUTPATIENT)
Dept: PHYSICAL THERAPY | Facility: CLINIC | Age: 61
End: 2021-05-26

## 2021-05-26 DIAGNOSIS — Z96.651 STATUS POST TOTAL RIGHT KNEE REPLACEMENT: Primary | ICD-10-CM

## 2021-05-26 PROCEDURE — 97110 THERAPEUTIC EXERCISES: CPT | Performed by: PHYSICAL THERAPIST

## 2021-05-26 PROCEDURE — 97140 MANUAL THERAPY 1/> REGIONS: CPT | Performed by: PHYSICAL THERAPIST

## 2021-05-26 NOTE — PROGRESS NOTES
Physical Therapy Daily Progress Note    VISIT#: 11    Subjective   More Staton reports: chief complaint is stiffness: dong cycling at home 2-3 x daily x 10 min. Overall, less catching sensation with ball (AAROM) exercise.    Pain level: mild pain today 2/10  Date of surgery: 4/2/2021  Next MD appt: 7/27/21    Objective     See Exercise, Manual, and Modality Logs for complete treatment.     Patient Education:    NuStep after manual x 10 min for ROM.  Assessment/Plan - focused on more manual therapy to increase ROM and decrease soft tissue adhesions.     Progress per Plan of Care         Timed:         Manual Therapy: 10     mins  43506;     Therapeutic Exercise:   40     mins  45533;     Neuromuscular Mili:        mins  58749;    Therapeutic Activity:          mins  30275;     Gait Training:           mins  66884;     Ultrasound:          mins  23281;    Ionto                                   mins   06661  Self Care                            mins   68141  Canalith Repos                   mins  4209  Aquatic                               mins 87299    Un-Timed:  Electrical Stimulation:        mins  00587 ( );  Dry Needling         mins self-pay  Traction          mins 94814  Low Eval          Mins  69346  Mod Eval          Mins  15733  High Eval                            Mins  63361  Re-Eval                               mins  58445    Timed Treatment:  50   mins   Total Treatment:     50  mins    Michi Juarez PT

## 2021-06-02 ENCOUNTER — TREATMENT (OUTPATIENT)
Dept: PHYSICAL THERAPY | Facility: CLINIC | Age: 61
End: 2021-06-02

## 2021-06-02 PROCEDURE — 97110 THERAPEUTIC EXERCISES: CPT | Performed by: PHYSICAL THERAPIST

## 2021-06-02 PROCEDURE — 97140 MANUAL THERAPY 1/> REGIONS: CPT | Performed by: PHYSICAL THERAPIST

## 2021-06-02 NOTE — PROGRESS NOTES
Physical Therapy Daily Progress Note    VISIT#: 12    Subjective   More Staton reports: As on last visit: c/c is stiffness in knee with walking.     Pain level: mild pain today 2/10  Date of surgery: 4/2/2021 - ~8 weeks.  Next MD appt: 7/27/21    Objective     See Exercise, Manual, and Modality Logs for complete treatment.     Patient Education:      Assessment/Plan - hypomobile patella and surrounding soft tissue - knee mobility improving but slowly; tight quad, but unable to stretch in prone - modified on step with good stretch and added to HEP.    Progress per Plan of Care         Timed:         Manual Therapy: 8     mins  43113;     Therapeutic Exercise:   38     mins  85086;     Neuromuscular Mili:        mins  99421;    Therapeutic Activity:          mins  98514;     Gait Training:           mins  64564;     Ultrasound:          mins  77744;    Ionto                                   mins   95007  Self Care                            mins   04463  Canalith Repos                   mins  4209  Aquatic                               mins 77121    Un-Timed:  Electrical Stimulation:        mins  25197 ( );  Dry Needling         mins self-pay  Traction          mins 93384  Low Eval          Mins  12215  Mod Eval          Mins  46846  High Eval                            Mins  74936  Re-Eval                               mins  44985    Timed Treatment:  46   mins   Total Treatment:     46  mins    Michi Juarez PT

## 2021-06-09 ENCOUNTER — TREATMENT (OUTPATIENT)
Dept: PHYSICAL THERAPY | Facility: CLINIC | Age: 61
End: 2021-06-09

## 2021-06-09 DIAGNOSIS — Z96.651 STATUS POST TOTAL RIGHT KNEE REPLACEMENT: Primary | ICD-10-CM

## 2021-06-09 PROCEDURE — 97116 GAIT TRAINING THERAPY: CPT | Performed by: PHYSICAL THERAPIST

## 2021-06-09 PROCEDURE — 97110 THERAPEUTIC EXERCISES: CPT | Performed by: PHYSICAL THERAPIST

## 2021-06-09 PROCEDURE — 97140 MANUAL THERAPY 1/> REGIONS: CPT | Performed by: PHYSICAL THERAPIST

## 2021-06-09 NOTE — PROGRESS NOTES
Physical Therapy Daily Progress Note    VISIT#: 13    Katerine Staton reports: knee very stiff this week, at times. Plans to contact MA for MD to discuss the stiffness in knee and fracture on xray    Pain level: 1-2/10  Date of surgery: 4/2/2021 - ~8 weeks.  Next MD appt: 7/27/21    Objective     See Exercise, Manual, and Modality Logs for complete treatment.     Patient Education:      Assessment/Plan - Focused on correcting faulty gait mechnaics - able to bend knee duriing gait cycle with VC's: exaggerated gait pattern and slowling brien. Knee mobility still impaired but improves with therapy.    Progress per Plan of Care         Timed:         Manual Therapy: 8     mins  44704;     Therapeutic Exercise:   30    mins  90465;     Neuromuscular Mili:        mins  86305;    Therapeutic Activity:          mins  69715;     Gait Training:      15     mins  86733;     Ultrasound:          mins  37344;    Ionto                                   mins   40982  Self Care                            mins   78627  Canalith Repos                   mins  4209  Aquatic                               mins 04724    Un-Timed:  Electrical Stimulation:        mins  94730 ( );  Dry Needling         mins self-pay  Traction          mins 62416  Low Eval          Mins  77524  Mod Eval          Mins  78043  High Eval                            Mins  33527  Re-Eval                               mins  98647    Timed Treatment: 53   mins   Total Treatment:     53 mins    Michi Juarez PT

## 2021-06-14 ENCOUNTER — TREATMENT (OUTPATIENT)
Dept: PHYSICAL THERAPY | Facility: CLINIC | Age: 61
End: 2021-06-14

## 2021-06-14 DIAGNOSIS — Z96.651 STATUS POST TOTAL RIGHT KNEE REPLACEMENT: Primary | ICD-10-CM

## 2021-06-14 PROCEDURE — 97110 THERAPEUTIC EXERCISES: CPT | Performed by: PHYSICAL THERAPIST

## 2021-06-14 NOTE — PROGRESS NOTES
Physical Therapy Daily Progress Note    VISIT#: 14    Subjective     More Staton reports: knee still stiff, but slowly better. Waiting on return call from MD.    Pain level: 1-2/10  Date of surgery: 4/2/2021 - ~8 weeks.  Next MD appt: 7/27/21    Objective       See Exercise, Manual, and Modality Logs for complete treatment.     Patient Education:      Assessment/Plan   - Initiated static knee flexion stretch on NK table to facilitate flexion. Still has gait abnormalities with decreased knee ROM during swing phase.     Progress per Plan of Care         Timed:         Manual Therapy:      mins  56877;     Therapeutic Exercise:   45    mins  55007;     Neuromuscular Mili:        mins  96904;    Therapeutic Activity:          mins  14179;     Gait Training:           mins  80805;     Ultrasound:          mins  23855;    Ionto                                   mins   79460  Self Care                            mins   51120  Canalith Repos                   mins  4209  Aquatic                               mins 43168    Un-Timed:  Electrical Stimulation:        mins  28188 ( );  Dry Needling         mins self-pay  Traction          mins 60926  Low Eval          Mins  90582  Mod Eval          Mins  83313  High Eval                            Mins  60869  Re-Eval                               mins  16056    Timed Treatment: 45   mins   Total Treatment:     45 mins    Michi Juarez PT

## 2021-06-18 ENCOUNTER — TREATMENT (OUTPATIENT)
Dept: PHYSICAL THERAPY | Facility: CLINIC | Age: 61
End: 2021-06-18

## 2021-06-18 DIAGNOSIS — Z96.651 STATUS POST TOTAL RIGHT KNEE REPLACEMENT: Primary | ICD-10-CM

## 2021-06-18 PROCEDURE — 97110 THERAPEUTIC EXERCISES: CPT | Performed by: PHYSICAL THERAPIST

## 2021-06-18 NOTE — PROGRESS NOTES
Physical Therapy Daily Progress Note    VISIT#: 15    Subjective   More Staton reports: in last 2 days knee is bending better with walking; MD office returned call to patient and med assistant said to call Monday if she needs to be seen by MD.    Pain level: 0-1/10  Date of surgery: 4/2/2021 - ~8 weeks.  Next MD appt: 7/27/21      Objective     See Exercise, Manual, and Modality Logs for complete treatment.     Patient Education:  Reviewed HEP  Access Code: [email or web address]  URL: [email or web address]  Date: 06/18/2021  Prepared by: Ken Juarez    Exercises  Seated or supine Leg Press with Resistance - 1 x daily - 7 x weekly - 3 sets - 10 reps (BLACK TB)      AROM: 18 to 98 in sitting    PROM: 14 to 105     Gait: improved knee flexion with gait cycle; still lacks extension equal to other LE with stance phase.      Assessment/Plan - subjective improvement with gait and ROM; minimal objective improvement.        Progress per Plan of Care         Timed:         Manual Therapy:   5   mins  25525;     Therapeutic Exercise:   40   mins  36163;     Neuromuscular Mili:        mins  77143;    Therapeutic Activity:          mins  19186;     Gait Training:           mins  93256;     Ultrasound:          mins  31917;    Ionto                                   mins   82936  Self Care                            mins   54309  Canalith Repos                   mins  4209  Aquatic                               mins 73395    Un-Timed:  Electrical Stimulation:        mins  57067 ( );  Dry Needling         mins self-pay  Traction          mins 65768  Low Eval          Mins  92197  Mod Eval          Mins  00509  High Eval                            Mins  39370  Re-Eval                               mins  29758    Timed Treatment: 45   mins   Total Treatment:     45 mins    Michi Juarez, PT

## 2021-06-21 ENCOUNTER — TREATMENT (OUTPATIENT)
Dept: PHYSICAL THERAPY | Facility: CLINIC | Age: 61
End: 2021-06-21

## 2021-06-21 DIAGNOSIS — Z96.651 STATUS POST TOTAL RIGHT KNEE REPLACEMENT: Primary | ICD-10-CM

## 2021-06-21 PROCEDURE — 97110 THERAPEUTIC EXERCISES: CPT | Performed by: PHYSICAL THERAPIST

## 2021-06-21 PROCEDURE — 97530 THERAPEUTIC ACTIVITIES: CPT | Performed by: PHYSICAL THERAPIST

## 2021-06-21 NOTE — PROGRESS NOTES
Physical Therapy Daily Progress Note    VISIT#: 16    Subjective   More Staton reports:Spoke to MD office: scheduled to be seen on Wed: states she is better, but still having trouble with bending knee when walking and it feels odd - has to concentrate on moving knee and LE with ambulation.    Pain level: 0-1/10  Date of surgery: 4/2/2021 - ~11 weeks.  Next MD appt: 6/23//21      Objective     See Exercise, Manual, and Modality Logs for complete treatment.     Patient Education:  Updated HEP  Access Code: [email or web address]  URL: [email or web address]  Date: 06/21/2021  Prepared by: Ken Juarez    Exercises  Seated Hamstring Stretch with Strap - 1 x daily - 7 x weekly - 1 sets - 3 reps - 20 sec hold  Heel Walking - 1 x daily - 7 x weekly - 1 sets - 1 reps: to facilitate knee extension.      AROM: 18 to 98 in sitting    PROM: 14 to 105     Gait: still impaired with gait: lacks full flexion and extension during gait cycle: extension > flexion.     Assessment/Plan - Moer is progressing slowly: she still lacks full knee extension and flexion; exhibits impaired gait mechanics secondary to ROM issues and quad weakness.     Focused on improving knee ROM and neuromuscular control of LE to facilitate knee ROM during gait cycle, improving ambulation and safety in community. Verbal cues to correct gait mechanics.      Progress per Plan of Care         Timed:         Manual Therapy:      mins  39132;     Therapeutic Exercise: 35     mins  55793;     Neuromuscular Mili:        mins  36261;    Therapeutic Activity:    10      mins  25470;     Gait Training:           mins  45721;     Ultrasound:          mins  88972;    Ionto                                   mins   38812  Self Care                            mins   23293  Canalith Repos                   mins  4209  Aquatic                               mins 73248    Un-Timed:  Electrical Stimulation:        mins  40534 ( );  Dry Needling         mins  self-pay  Traction          mins 09086  Low Eval          Mins  49730  Mod Eval          Mins  30006  High Eval                            Mins  43946  Re-Eval                               mins  37664    Timed Treatment: 45   mins   Total Treatment:     45 mins    Michi Juarez PT

## 2021-06-25 ENCOUNTER — TREATMENT (OUTPATIENT)
Dept: PHYSICAL THERAPY | Facility: CLINIC | Age: 61
End: 2021-06-25

## 2021-06-25 PROCEDURE — 97112 NEUROMUSCULAR REEDUCATION: CPT | Performed by: PHYSICAL THERAPIST

## 2021-06-25 PROCEDURE — 97110 THERAPEUTIC EXERCISES: CPT | Performed by: PHYSICAL THERAPIST

## 2021-06-25 NOTE — PROGRESS NOTES
Physical Therapy Daily Progress Note    VISIT#: 17    Subjective   More Staton reports:Spoke to MD and x-rays taken in office: states she is doing well and needs to continue her program, focusing on getting knee straight.    Pain level: 0-1/10  Date of surgery: 4/2/2021 - ~12 weeks.  Next MD appt: 6/23//21      Objective     See Exercise, Manual, and Modality Logs for complete treatment.     Patient Education:      AROM: 18 to 98 in sitting    PROM: 14 to 105     Gait: still impaired with gait: lacks full flexion and extension during gait cycle: extension > flexion.     Assessment/Plan - focused on knee extension and normalizing gait mechanics, improved neuromuscular control (balance and coordination); verbal cues for equal weight bearing on both LE's.       Progress per Plan of Care         Timed:         Manual Therapy:      mins  29240;     Therapeutic Exercise: 35     mins  60635;     Neuromuscular Mili:   10     mins  29607;    Therapeutic Activity:         mins  24959;     Gait Training:           mins  54596;     Ultrasound:          mins  65846;    Ionto                                   mins   83660  Self Care                            mins   23939  Canalith Repos                   mins  4209  Aquatic                               mins 56537    Un-Timed:  Electrical Stimulation:        mins  28023 ( );  Dry Needling         mins self-pay  Traction          mins 39102  Low Eval          Mins  71511  Mod Eval          Mins  10736  High Eval                            Mins  57641  Re-Eval                               mins  78674    Timed Treatment: 45   mins   Total Treatment:     45 mins    Michi Juarez PT

## 2021-06-28 ENCOUNTER — TREATMENT (OUTPATIENT)
Dept: PHYSICAL THERAPY | Facility: CLINIC | Age: 61
End: 2021-06-28

## 2021-06-28 DIAGNOSIS — Z96.651 STATUS POST TOTAL RIGHT KNEE REPLACEMENT: Primary | ICD-10-CM

## 2021-06-28 PROCEDURE — 97110 THERAPEUTIC EXERCISES: CPT | Performed by: PHYSICAL THERAPIST

## 2021-06-28 PROCEDURE — 97112 NEUROMUSCULAR REEDUCATION: CPT | Performed by: PHYSICAL THERAPIST

## 2021-06-28 NOTE — PROGRESS NOTES
Re-Assessment / Re-Certification        Patient: More Staton   : 1960  Diagnosis/ICD-10 Code:  Status post total right knee replacement [Z96.651]  Referring practitioner: Tarun Cam*  Date of Initial Visit: Type: THERAPY  Noted: 2021  Today's Date: 2021  Patient seen for 18 sessions      Subjective:   More Staton reports:  She is doing HEP regularly and is to return to work in late July. Working on ROM.     Subjective Questionnaire: Oxford knee: 29/48 x 100 = 60% disability at exam; 35% disability currently  Clinical Progress: improved  Home Program Compliance: Yes  Treatment has included: therapeutic exercise, neuromuscular re-education, manual therapy, therapeutic activity, gait training, electrical stimulation, moist heat and cryotherapy    Subjective - see above  Objective   AROM: 18 to 98 in sitting     PROM: 14 to 105      Gait: still impaired with gait: lacks full flexion and extension during gait cycle: extension > flexion.       Access Code: [email or web address]  URL: [email or web address]  Date: 2021  Prepared by: Ken Juarez    Exercises  Prone Quadriceps Stretch with Strap - 1 x daily - 7 x weekly - 1 sets - 3 reps - 30 hold  Prone Knee Extension Hang - 1 x daily - 7 x weekly - 1 sets - 1 reps - 3 min hold      Assessment/Plan - patient progressing slower than typical TKR, but is consistent w/ HEP and continuing to improve,    Progress toward previous goals: Partially Met        Recommendations: Continue with recommendations continue x 8 visits to achieve knee extension and flexion goals; improve gait.   Timeframe: 3 months  Prognosis to achieve goals: fair    PT Signature: Michi Juarez PT      Based upon review of the patient's progress and continued therapy plan, it is my medical opinion that More Staton should continue physical therapy treatment at [facility]  [address] [place] [address] [place]  [address] [phone].    Signature: __________________________________  Tarun Cam II, MD    Timed:         Manual Therapy:         mins  00600;     Therapeutic Exercise:    35     mins  10995;     Neuromuscular Mili:   10     mins  77616;    Therapeutic Activity:          mins  63098;     Gait Training:          mins  26737;     Ultrasound:         mins  30056;    Ionto                                   mins   91784  Self Care                            mins   97182  Aquatic                               mins 61043      Un-Timed:  Electrical Stimulation:         mins  27300 ( );  Dry Needling          mins self-pay  Traction          mins 71757  Low Eval          Mins  09983  Mod Eval          Mins  88733  High Eval                            Mins  85061  Re-Eval                               mins  72058      Timed Treatment:  45    mins   Total Treatment:    45   mins

## 2021-07-29 ENCOUNTER — TREATMENT (OUTPATIENT)
Dept: PHYSICAL THERAPY | Facility: CLINIC | Age: 61
End: 2021-07-29

## 2021-07-29 DIAGNOSIS — Z96.651 STATUS POST TOTAL RIGHT KNEE REPLACEMENT: Primary | ICD-10-CM

## 2021-07-29 PROCEDURE — 97112 NEUROMUSCULAR REEDUCATION: CPT | Performed by: PHYSICAL THERAPIST

## 2021-07-29 PROCEDURE — 97110 THERAPEUTIC EXERCISES: CPT | Performed by: PHYSICAL THERAPIST

## 2021-07-29 NOTE — PROGRESS NOTES
Physical Therapy Daily Progress Note     Diagnosis Plan   1. Status post total right knee replacement         VISIT#: 19    Subjective   More Staton reports: still having stiffness and difficulty walking; pain level 2/10 - 8/10 (worse after leaving work).    Date of surgery: 4/2/2021 - ~16 weeks.  Next MD appt: 8/21//21    Objective     AROM: 18 to 103 in sitting  PROM: 12 to105   Gait: mechanics improved with less stiffness noted in gait pattern and increased knee flexion and extension during gait cycle.    See Exercise, Manual, and Modality Logs for complete treatment.   Patient Education:    Assessment/Plan slight improvement with gait and ROM; improved neuromuscular control but still exhibits deficits that affect function.       Progress per Plan of Care - re-assess            Timed:         Manual Therapy:         mins  29624;     Therapeutic Exercise:   35     mins  33866;     Neuromuscular Mili:   12     mins  79928;    Therapeutic Activity:          mins  54674;     Gait Training:          mins  28733;     Ultrasound:          mins  11152;    Ionto                                   mins   55333  Self Care                            mins   42514  Canalith Repos                   mins  4209  Aquatic                               mins 69851    Un-Timed:  Electrical Stimulation:         mins  53989 ( );  Dry Needling          mins self-pay  Traction          mins 23348  Low Eval          Mins  46282  Mod Eval          Mins  94223  High Eval                            Mins  76016  Re-Eval                               mins  68982    Timed Treatment:   47  mins   Total Treatment:     47   mins    Michi Juarez PT PT, DPT, [id number]

## 2021-08-05 ENCOUNTER — TREATMENT (OUTPATIENT)
Dept: PHYSICAL THERAPY | Facility: CLINIC | Age: 61
End: 2021-08-05

## 2021-08-05 DIAGNOSIS — Z96.651 STATUS POST TOTAL RIGHT KNEE REPLACEMENT: Primary | ICD-10-CM

## 2021-08-05 PROCEDURE — 97110 THERAPEUTIC EXERCISES: CPT | Performed by: PHYSICAL THERAPIST

## 2021-08-05 NOTE — PROGRESS NOTES
Re-Assessment / Re-Certification        Patient: More Staton   : 1960  Diagnosis/ICD-10 Code:  Status post total right knee replacement [Z96.651]  Referring practitioner: Tarun Cam*  Date of Initial Visit: Type: THERAPY  Noted: 2021  Today's Date: 2021  Patient seen for 20 sessions      Subjective:   More Staton reports: still having stiffness and difficulty walking but reports slow improvement; knee feels stiff and does not move well at certain times.    pain level 0/10 - 5/10 (worse after leaving work).      Subjective Questionnaire: Oxford knee: 33%  Clinical Progress: improved  Home Program Compliance: Yes  Treatment has included: therapeutic exercise, neuromuscular re-education, manual therapy, therapeutic activity, electrical stimulation and cryotherapy    Subjective - see above    Objective   AROM: 14 to 100 in sitting     PROM: 10 to 105      Gait: still impaired with gait: lacks full flexion and extension during gait cycle: extension > flexion.     MMT: B knees      Assessment/Plan - patient is progressing slower than typical TKR, but is consistent w/ HEP and continuing to improve; her ROM is limited; gait impaired but has shown slow progress. Her pain level has reduced with functional activities and at rest. Strength restored. She is out of insurance visits and would have to be transitioned to self-pay.      Progress toward previous goals: Partially Met      Recommendations: Continue with recommendations please advise with regard to PT  Timeframe: 3 months  Prognosis to achieve goals: fair    PT Signature: Michi Juarez, PT      Based upon review of the patient's progress and continued therapy plan, it is my medical opinion that More Staton should continue physical therapy treatment at [facility]  [address] [place] [address] [place] [address] [phone].    Signature: __________________________________  Dorina  Tarun Blackman II, MD    Timed:         Manual Therapy:         mins  04133;     Therapeutic Exercise:     40    mins  79299;     Neuromuscular Mili:        mins  36122;    Therapeutic Activity:         mins  95728;     Gait Training:           mins  76324;     Ultrasound:          mins  99155;    Ionto                                   mins   56554  Self Care                            mins   97116  Aquatic                               mins 87775      Un-Timed:  Electrical Stimulation:         mins  40630 ( );  Dry Needling          mins self-pay  Traction          mins 39468  Low Eval          Mins  61906  Mod Eval         Mins  64084  High Eval                            Mins  79677  Re-Eval                               mins  04614      Timed Treatment:   40  mins   Total Treatment:     40   mins

## 2021-10-26 ENCOUNTER — PATIENT MESSAGE (OUTPATIENT)
Dept: FAMILY MEDICINE CLINIC | Facility: CLINIC | Age: 61
End: 2021-10-26

## 2021-10-26 DIAGNOSIS — U07.1 COVID-19 VIRUS INFECTION: Primary | ICD-10-CM

## 2021-11-17 ENCOUNTER — TRANSCRIBE ORDERS (OUTPATIENT)
Dept: CASE MANAGEMENT | Facility: CLINIC | Age: 61
End: 2021-11-17

## 2021-11-17 DIAGNOSIS — Z12.31 SCREENING MAMMOGRAM FOR BREAST CANCER: Primary | ICD-10-CM

## 2021-11-23 ENCOUNTER — TRANSCRIBE ORDERS (OUTPATIENT)
Dept: ADMINISTRATIVE | Facility: HOSPITAL | Age: 61
End: 2021-11-23

## 2021-11-23 DIAGNOSIS — M85.80 OTHER SPECIFIED DISORDERS OF BONE DENSITY AND STRUCTURE, UNSPECIFIED SITE: Primary | ICD-10-CM

## 2021-11-30 ENCOUNTER — HOSPITAL ENCOUNTER (OUTPATIENT)
Dept: GENERAL RADIOLOGY | Facility: HOSPITAL | Age: 61
Discharge: HOME | End: 2021-11-30

## 2021-11-30 ENCOUNTER — OFFICE VISIT (OUTPATIENT)
Dept: FAMILY MEDICINE CLINIC | Facility: CLINIC | Age: 61
End: 2021-11-30

## 2021-11-30 ENCOUNTER — HOSPITAL ENCOUNTER (OUTPATIENT)
Dept: MAMMOGRAPHY | Facility: HOSPITAL | Age: 61
Discharge: HOME | End: 2021-11-30

## 2021-11-30 VITALS
OXYGEN SATURATION: 99 % | HEIGHT: 67 IN | HEART RATE: 58 BPM | DIASTOLIC BLOOD PRESSURE: 80 MMHG | RESPIRATION RATE: 18 BRPM | SYSTOLIC BLOOD PRESSURE: 152 MMHG | WEIGHT: 258 LBS | TEMPERATURE: 97.1 F | BODY MASS INDEX: 40.49 KG/M2

## 2021-11-30 DIAGNOSIS — M51.37 DDD (DEGENERATIVE DISC DISEASE), LUMBOSACRAL: Primary | ICD-10-CM

## 2021-11-30 DIAGNOSIS — Z12.31 SCREENING MAMMOGRAM FOR BREAST CANCER: ICD-10-CM

## 2021-11-30 DIAGNOSIS — M17.12 PRIMARY OSTEOARTHRITIS OF LEFT KNEE: ICD-10-CM

## 2021-11-30 DIAGNOSIS — M51.37 DDD (DEGENERATIVE DISC DISEASE), LUMBOSACRAL: ICD-10-CM

## 2021-11-30 PROCEDURE — 72100 X-RAY EXAM L-S SPINE 2/3 VWS: CPT

## 2021-11-30 PROCEDURE — 77063 BREAST TOMOSYNTHESIS BI: CPT

## 2021-11-30 PROCEDURE — 77067 SCR MAMMO BI INCL CAD: CPT

## 2021-11-30 PROCEDURE — 73560 X-RAY EXAM OF KNEE 1 OR 2: CPT

## 2021-11-30 PROCEDURE — 99213 OFFICE O/P EST LOW 20 MIN: CPT | Performed by: INTERNAL MEDICINE

## 2021-11-30 RX ORDER — GABAPENTIN 300 MG/1
300 CAPSULE ORAL NIGHTLY
Qty: 30 CAPSULE | Refills: 3 | Status: SHIPPED | OUTPATIENT
Start: 2021-11-30 | End: 2022-03-24

## 2021-11-30 RX ORDER — FUROSEMIDE 20 MG/1
10 TABLET ORAL DAILY PRN
Qty: 20 TABLET | Refills: 1 | Status: SHIPPED | OUTPATIENT
Start: 2021-11-30 | End: 2022-05-04

## 2021-11-30 RX ORDER — NABUMETONE 750 MG/1
750 TABLET, FILM COATED ORAL 2 TIMES DAILY
Qty: 60 TABLET | Refills: 3 | Status: SHIPPED | OUTPATIENT
Start: 2021-11-30 | End: 2022-05-27

## 2021-11-30 NOTE — PROGRESS NOTES
Rooming Tab(CC,VS,Pt Hx,Fall Screen)  Chief Complaint   Patient presents with   • Leg Swelling       Subjective      Left lower extremity swelling  More presents today for left lower extremity swelling. She had a right knee arthroplasty this past April, she states her knee is not healing. She reports pain that is constant. She denies use of a cane while working. She states she feels numbness onto her toes. She also makes note of a past history of plantar fasciitis. She makes note of hip pain due to the way she is walking. She states Dr. Cam has not done any injections into his knee, nor an x-ray. It has been a while since she has had an x-ray of her lumbar spine. More states she is not qualified to do a job that is mostly sitting. She makes note of a history of carpal tunnel. She states she has not experienced any relief with Celebrex, Tylenol arthritis, and meloxicam. She states Tramadol gave her a headache. More states she is not able to have a full night sleep due to the pain. She has tried physical therapy in the past for her knee.    Preventative care  More is due for her mammogram.       I have reviewed and updated her medications, medical history and problem list during today's office visit.     Patient Care Team:  Kerry Mayes MD as PCP - General    Problem List Tab  Medications Tab  Synopsis Tab  Chart Review Tab  Care Everywhere Tab  Immunizations Tab  Patient History Tab    Social History     Tobacco Use   • Smoking status: Former Smoker     Packs/day: 1.00     Years: 15.00     Pack years: 15.00     Types: Cigarettes     Start date:      Quit date:      Years since quittin.5   • Smokeless tobacco: Never Used   • Tobacco comment: Off and on use amount varies   Substance Use Topics   • Alcohol use: No       Review of Systems  Answers for HPI/ROS submitted by the patient on 2021  What is the primary reason for your visit?: Physical    A review of systems was performed,  "and the positive findings are in the HPI.     Objective     Rooming Tab(CC,VS,Pt Hx,Fall Screen)  /80 (BP Location: Left arm, Patient Position: Sitting, Cuff Size: Adult)   Pulse 58   Temp 97.1 °F (36.2 °C) (Temporal)   Resp 18   Ht 170.2 cm (67.01\")   Wt 117 kg (258 lb)   SpO2 99%   BMI 40.40 kg/m²     Body mass index is 40.4 kg/m².    Physical Exam  Vitals and nursing note reviewed.   Constitutional:       Appearance: Normal appearance. She is well-developed. She is obese.   HENT:      Head: Normocephalic and atraumatic.      Right Ear: Tympanic membrane normal.      Left Ear: Tympanic membrane normal.      Nose: No rhinorrhea.      Mouth/Throat:      Pharynx: No posterior oropharyngeal erythema.   Eyes:      Pupils: Pupils are equal, round, and reactive to light.   Cardiovascular:      Rate and Rhythm: Normal rate and regular rhythm.      Pulses: Normal pulses.      Heart sounds: Normal heart sounds. No murmur heard.      Pulmonary:      Effort: Pulmonary effort is normal.      Breath sounds: Normal breath sounds.   Abdominal:      General: Bowel sounds are normal. There is no distension.      Palpations: Abdomen is soft.   Musculoskeletal:         General: Tenderness present.      Cervical back: Normal range of motion and neck supple.      Right lower leg: Edema present.      Comments: Right knee TKR well healed, tender lumbar with right SI   Skin:     Capillary Refill: Capillary refill takes less than 2 seconds.   Neurological:      Mental Status: She is alert and oriented to person, place, and time.   Psychiatric:         Mood and Affect: Mood normal.         Behavior: Behavior normal.          Statin Choice Calculator  Data Reviewed:    XR Spine Lumbar 2 or 3 View    Result Date: 11/30/2021  Impression: No fracture or other acute abnormality of the lumbar spine.  Degenerative changes as discussed mostly involving the facet joints, particularly the right-sided L4-5 and L5-S1 facet joint  " Electronically Signed By-Favio Martinez On:11/30/2021 3:51 PM This report was finalized on [id number] by  Favio Martinez, .    XR Knee 1 or 2 View Left    Result Date: 11/30/2021  Impression: Advanced osteoarthritis of the patellofemoral joint.  Mild changes elsewhere in the knee.  Chondrocalcinosis of both menisci  Electronically Signed By-Favio Martinez On:11/30/2021 3:55 PM This report was finalized on [id number] by  Favio Martinez, .    Mammo Screening Digital Tomosynthesis Bilateral With CAD    Result Date: 11/30/2021  Impression: No mammographic evidence of malignancy is demonstrated within either breast. No significant change compared to prior exams.  RECOMMENDATION: Recommend patient continue with annual bilateral screening mammography and continued clinical breast exams.  BI-RADS ASSESSMENT: BI-RADS 1. Negative.  The patient's information is entered into a computerized reminder system with a targeted due date for the next mammogram.  Note:  It has been reported that there is approximately a 15% false negative in mammography.  Therefore, management of a palpable abnormality should not be deferred because of a negative mammogram.   Electronically Signed By-Josh Lin MD On:11/30/2021 2:02 PM This report was finalized on [id number] by  Josh Lin MD.      The data below has been reviewed by Kerry Mayes MD on 11/30/2021.          Assessment/Plan   Order Review Tab  Health Maintenance Tab  Patient Plan/Order Tab  Diagnoses and all orders for this visit:    1. DDD (degenerative disc disease), lumbosacral (Primary)  -     XR Spine Lumbar 2 or 3 View; Future  -  We will try Relafen twice a day with food and gabapentin at night to decrease inflammation.   -  Apply heating pad    2. Primary osteoarthritis of left knee  -     XR Knee 1 or 2 View Left; Future  -  Advised the patient to keep her quads strong and work on daily exercises. She is wearing her ortho shoes everyday, she states she does not go  barefoot.  -  Advised the patient we can begin steroid injections if needed.   -  Advised patient to only take Lasix the days she is up and working.  -  Wear compression socks on days you are standing.    Other orders  -     nabumetone (Relafen) 750 MG tablet; Take 1 tablet by mouth 2 (Two) Times a Day.  Dispense: 60 tablet; Refill: 3  -     gabapentin (NEURONTIN) 300 MG capsule; Take 1 capsule by mouth Every Night.  Dispense: 30 capsule; Refill: 3  -     furosemide (Lasix) 20 MG tablet; Take 0.5 tablets by mouth Daily As Needed (swelling).  Dispense: 20 tablet; Refill: 1        Wrapup Tab  Return if symptoms worsen or fail to improve.       They were informed of the diagnosis and treatment plan and directed to f/u for any further problems or concerns.          Transcribed from ambient dictation for Kerry Mayes MD by Lucie Holland.  11/30/21   15:43 EST    Patient verbalized consent to the visit recording.  I have personally performed the services described in this document as transcribed by the above individual, and it is both accurate and complete.  Kerry Mayes MD  11/30/2021  18:15 EST

## 2021-12-02 ENCOUNTER — PATIENT MESSAGE (OUTPATIENT)
Dept: FAMILY MEDICINE CLINIC | Facility: CLINIC | Age: 61
End: 2021-12-02

## 2021-12-02 DIAGNOSIS — M54.16 LUMBAR RADICULOPATHY: Primary | ICD-10-CM

## 2021-12-02 NOTE — TELEPHONE ENCOUNTER
From: More Staton  To: Kerry Mayes MD  Sent: 12/2/2021 9:16 AM EST  Subject: X rays    Just curious how my x rays looked , if I needed to do anything else ? Thanks , More Staton

## 2021-12-03 ENCOUNTER — PATIENT MESSAGE (OUTPATIENT)
Dept: FAMILY MEDICINE CLINIC | Facility: CLINIC | Age: 61
End: 2021-12-03

## 2021-12-03 DIAGNOSIS — F40.240 CLAUSTROPHOBIA: Primary | ICD-10-CM

## 2021-12-03 RX ORDER — DIAZEPAM 5 MG/1
5 TABLET ORAL
Qty: 2 TABLET | Refills: 0 | Status: SHIPPED | OUTPATIENT
Start: 2021-12-03 | End: 2021-12-03

## 2021-12-03 NOTE — TELEPHONE ENCOUNTER
From: More Staton  To: Kerry Mayes MD  Sent: 12/3/2021 3:55 AM EST  Subject: Mri    Please send order for mri to [facility] and prescription for Valium and I will get this done thanks for all you do More Staton

## 2021-12-06 ENCOUNTER — TELEPHONE (OUTPATIENT)
Dept: FAMILY MEDICINE CLINIC | Facility: CLINIC | Age: 61
End: 2021-12-06

## 2021-12-06 DIAGNOSIS — M54.16 LUMBAR RADICULOPATHY: Primary | ICD-10-CM

## 2021-12-06 NOTE — TELEPHONE ENCOUNTER
Caller: More Staton    Relationship: Self    Best call back number: [phone]    What is the medical concern/diagnosis: BACK/KNEE PAIN    What specialty or service is being requested: PAIN MANAGEMENT    What is the provider, practice or medical service name: DR MARTI WITH [facility]    What is the office location: [facility]

## 2021-12-09 NOTE — PROGRESS NOTES
CHIEF COMPLAINT  Lower back and knee pain.       Subjective   More Staton is a 61 y.o. female who was referred by Kerry Singleton MD  to our pain management clinic for consultation, evaluation and treatment of lower back and knee pain.  She had lower back pain on and off, but radicular pain started several months after R knee surgery. Low back pain started April 2021 without any significant injury has been getting worse since then.  She has tried Celebrex, Tylenol arthritis, meloxicam without much relief.  Tramadol gave her headache. Schedule for MRI Lumbar spine on 12/21/2021.     Lower back pain is 4/10 on VAS, at maximum is 9/10. Pain is aching, stabbing, numbness in nature. Pain is referred right posterior lateral thigh, lateral leg to lateral foot - numbness and tingling in 4th and 5th toes on right. The pain is constant. The pain is improved by nothing. The pain is worse with lifting, walking, standing.    PHQ-9- 4  SOAPP- 4    PMH:   Osteopenia, history of pancreatitis, right TKA-4/2021 by Dr. Cam, hysterectomy, bladder suspension, history of plantar fasciitis    Current Medications:   Gabapentin 300 mg nightly  Nabumetone       Past Medications:    Past Modalities:  TENS:       no          Physical Therapy Within The Last 6 Months     PT- 4/2021- 8/2021   Psychotherapy     no  Massage Therapy      no    Patient Complains Of:  Uro-Fecal Incontinence no  Weight Gain/Loss  no  Fever/Chills   no  Weakness   no      PEG Assessment   What number best describes your pain on average in the past week?6  What number best describes how, during the past week, pain has interfered with your enjoyment of life?6  What number best describes how, during the past week, pain has interfered with your general activity?  6        Current Outpatient Medications:   •  cetirizine (zyrTEC) 10 MG tablet, TAKE ONE TABLET BY MOUTH DAILY, Disp: 90 tablet, Rfl: 3  •  furosemide (Lasix) 20 MG tablet, Take 0.5 tablets by  mouth Daily As Needed (swelling)., Disp: 20 tablet, Rfl: 1  •  gabapentin (NEURONTIN) 300 MG capsule, Take 1 capsule by mouth Every Night., Disp: 30 capsule, Rfl: 3  •  levothyroxine (SYNTHROID, LEVOTHROID) 112 MCG tablet, Take 112 mcg by mouth Daily. Take dos, Disp: , Rfl:   •  nabumetone (Relafen) 750 MG tablet, Take 1 tablet by mouth 2 (Two) Times a Day., Disp: 60 tablet, Rfl: 3  •  aspirin  MG tablet, Take 1 tablet by mouth Daily., Disp: 30 tablet, Rfl: 0  •  B Complex Vitamins (VITAMIN B COMPLEX) capsule capsule, Take 1 capsule by mouth Daily., Disp: , Rfl:   •  Calcium-Magnesium (FARZANA-MAG) 500-250 MG tablet, Take 1 tablet by mouth Daily., Disp: , Rfl:   •  Cholecalciferol (SM VITAMIN D3) 4000 units capsule, Take 1 capsule by mouth Daily., Disp: , Rfl:   •  ondansetron ODT (Zofran ODT) 4 MG disintegrating tablet, Place 1 tablet on the tongue Every 8 (Eight) Hours As Needed for Nausea or Vomiting., Disp: 20 tablet, Rfl: 0  •  promethazine-dextromethorphan (PROMETHAZINE-DM) 6.25-15 MG/5ML syrup, Take 5 mL by mouth At Night As Needed for Cough., Disp: 90 mL, Rfl: 0    The following portions of the patient's history were reviewed and updated as appropriate: allergies, current medications, past family history, past medical history, past social history, past surgical history, and problem list.      REVIEW OF PERTINENT MEDICAL DATA    Past Medical History:   Diagnosis Date   • Allergic 03/2014   • Anemia     My whole life   • Arthritis    • Colon polyp 2019   • Concussion 1995   • Hypothyroidism 2015   • Leg pain, right    • Low back pain    • Osteopenia 2018   • Pancreatitis    • PONV (postoperative nausea and vomiting)     severe, ended up coming to ER post surgery   • Seasonal allergies    • Thyroid disease      Past Surgical History:   Procedure Laterality Date   • BLADDER SUSPENSION     • CHOLECYSTECTOMY     • COLONOSCOPY     • ENDOMETRIAL ABLATION     • EYE SURGERY     • HYSTERECTOMY     • JOINT REPLACEMENT  " 2021   • OOPHORECTOMY     • TONSILLECTOMY     • TOTAL KNEE ARTHROPLASTY Right 2021    Procedure: TOTAL KNEE ARTHROPLASTY;  Surgeon: Tarun Cam II, MD;  Location: [facility] OR;  Service: Orthopedics;  Laterality: Right;   • TUBAL ABDOMINAL LIGATION       Family History   Problem Relation Age of Onset   • Hyperlipidemia Mother    • Vision loss Mother    • Alcohol abuse Mother    • Stroke Daughter    • Cancer Paternal Aunt    • Arthritis Paternal Aunt    • COPD Paternal Uncle    • Arthritis Paternal Aunt    • Early death Paternal Aunt    • Cancer Paternal Aunt    • COPD Paternal Uncle    • Learning disabilities Daughter    • Stroke Daughter    • Arthritis Maternal Grandmother    • Cancer Paternal Grandmother    • COPD Father      Social History     Socioeconomic History   • Marital status:    Tobacco Use   • Smoking status: Former Smoker     Packs/day: 1.00     Years: 15.00     Pack years: 15.00     Types: Cigarettes     Start date:      Quit date:      Years since quittin.6   • Smokeless tobacco: Never Used   • Tobacco comment: Off and on use amount varies   Vaping Use   • Vaping Use: Never used   Substance and Sexual Activity   • Alcohol use: No   • Drug use: No   • Sexual activity: Not Currently     Partners: Male     Birth control/protection: None     Comment: Hysterectomy         Review of Systems   Musculoskeletal: Positive for arthralgias, back pain and gait problem.         Vitals:    21 1429   BP: 140/46   Pulse: 76   Resp: 16   SpO2: 99%   Weight: 113 kg (250 lb)   Height: 170.2 cm (67\")   PainSc:   6         Objective   Physical Exam  Musculoskeletal:         General: Tenderness present.        Legs:    Neurological:      Deep Tendon Reflexes:      Reflex Scores:       Patellar reflexes are 2+ on the right side and 2+ on the left side.       Achilles reflexes are 2+ on the right side and 2+ on the left side.     Comments: Motor strength 5/5 b/l LE  Sensory " intact b/l LE             Imaging Reviewed:  X-ray lumbar spine-11/30/2021  -Large amount of bilateral lower lumbar spine facet degenerative changes greatest at right L4-5 and L5-S1.  Mild disc space narrowing at multiple levels.    Left knee x-ray-11/30/2021  Advanced osteoarthritis of the patellofemoral joint.  Joint space narrowing.    Assessment:    1. DDD (degenerative disc disease), lumbar    2. Lumbar spondylosis    3. Sacroiliac joint dysfunction         Plan:   1.  Defer UDS for now.  2. We discussed trying a course of formal physical therapy.  Physical therapy can help strengthen and stretch the muscles around the joints. Continue to be as active as possible. Start physical therapy as it will help generalized pain and follow up with HEP.   3.  Patient has 2 issues.  Her axial lower back pain on the right side is originating from the facet joints and SI joint arthritis.  Her radicular pain on the right side is most likely originating from lumbar stenosis  Indicated by positive SLR and pain pattern in L5, S1 distributions.  4. Patient has pain in the lower back with referred pain in the leg, patient has failed conservative therapy including PT and pharmacological management for more than 6 weeks and pain interferes with activities of daily living.  SLR is positive on right side and patient has radicular pain in L5, S1 dermatomes on right side.  Discussed lumbar ALLIE L5-S1. Discussed the possibility of infection, bleeding, nerve damage, post dural puncture headache, increased pain, paraplegia. Patient understands and agrees.  Will review MRI there will be done on 12/21/2021 before proceeding with injection on 1/4/2021.  5.  Continue gabapentin and NSAIDs as prescribed currently.    RTC for injection and then 2 week follow up.     Kalpesh Small DO  Pain Management   [facility]         INSPECT REPORT    As part of the patient's treatment plan, I may be prescribing controlled substances. The patient has been  made aware of appropriate use of such medications, including potential risk of somnolence, limited ability to drive and/or work safely, and the potential for dependence or overdose. It has also been made clear that these medications are for use by this patient only, without concomitant use of alcohol or other substances unless prescribed.     Patient has completed prescribing agreement detailing terms of continued prescribing of controlled substances, including monitoring INSPECT reports, urine drug screening, and pill counts if necessary. The patient is aware that inappropriate use will results in cessation of prescribing such medications.    INSPECT report has been reviewed and scanned into the patient's chart.      EMR Dragon/Transcription Disclaimer:   Much of this encounter note is an electronic transcription/translation of spoken language to printed text. The electronic translation of spoken language may permit erroneous, or at times, nonsensical words or phrases to be inadvertently transcribed; Although I have reviewed the note for such errors, some may still exist.

## 2021-12-13 ENCOUNTER — OFFICE VISIT (OUTPATIENT)
Dept: PAIN MEDICINE | Facility: CLINIC | Age: 61
End: 2021-12-13

## 2021-12-13 VITALS
RESPIRATION RATE: 16 BRPM | BODY MASS INDEX: 39.24 KG/M2 | DIASTOLIC BLOOD PRESSURE: 46 MMHG | HEIGHT: 67 IN | OXYGEN SATURATION: 99 % | SYSTOLIC BLOOD PRESSURE: 140 MMHG | WEIGHT: 250 LBS | HEART RATE: 76 BPM

## 2021-12-13 DIAGNOSIS — M53.3 SACROILIAC JOINT DYSFUNCTION: ICD-10-CM

## 2021-12-13 DIAGNOSIS — M47.816 LUMBAR SPONDYLOSIS: ICD-10-CM

## 2021-12-13 DIAGNOSIS — M51.36 DDD (DEGENERATIVE DISC DISEASE), LUMBAR: Primary | ICD-10-CM

## 2021-12-13 PROCEDURE — 99204 OFFICE O/P NEW MOD 45 MIN: CPT | Performed by: STUDENT IN AN ORGANIZED HEALTH CARE EDUCATION/TRAINING PROGRAM

## 2021-12-15 ENCOUNTER — TELEPHONE (OUTPATIENT)
Dept: FAMILY MEDICINE CLINIC | Facility: CLINIC | Age: 61
End: 2021-12-15

## 2021-12-15 NOTE — TELEPHONE ENCOUNTER
Caller: More Staton    Relationship to patient: Self    Best call back number: [phone]    Chief complaint: LEFT KNEE PAIN, WOULD LIKE INJECTION    Type of visit:IN OFFICE PROCEDURE    Requested date: AS SOON AS POSSIBLE    Additional notes: PATIENT WAS SEEN ON 11/30/2021 AND DISCUSSED GETTING CORTISONE INJECTION. PLEASE CALL TO SCHEDULE

## 2021-12-21 ENCOUNTER — HOSPITAL ENCOUNTER (OUTPATIENT)
Dept: MRI IMAGING | Facility: HOSPITAL | Age: 61
Discharge: HOME | End: 2021-12-21
Admitting: INTERNAL MEDICINE

## 2021-12-21 DIAGNOSIS — M54.16 LUMBAR RADICULOPATHY: ICD-10-CM

## 2021-12-21 PROCEDURE — 72148 MRI LUMBAR SPINE W/O DYE: CPT

## 2021-12-29 ENCOUNTER — HOSPITAL ENCOUNTER (OUTPATIENT)
Dept: BONE DENSITY | Facility: HOSPITAL | Age: 61
Discharge: HOME | End: 2021-12-29
Admitting: NURSE PRACTITIONER

## 2021-12-29 DIAGNOSIS — M85.80 OTHER SPECIFIED DISORDERS OF BONE DENSITY AND STRUCTURE, UNSPECIFIED SITE: ICD-10-CM

## 2021-12-29 PROCEDURE — 77080 DXA BONE DENSITY AXIAL: CPT

## 2021-12-30 ENCOUNTER — PROCEDURE VISIT (OUTPATIENT)
Dept: FAMILY MEDICINE CLINIC | Facility: CLINIC | Age: 61
End: 2021-12-30

## 2021-12-30 VITALS
BODY MASS INDEX: 39.24 KG/M2 | WEIGHT: 250 LBS | DIASTOLIC BLOOD PRESSURE: 90 MMHG | OXYGEN SATURATION: 99 % | HEIGHT: 67 IN | TEMPERATURE: 96.9 F | RESPIRATION RATE: 16 BRPM | SYSTOLIC BLOOD PRESSURE: 138 MMHG | HEART RATE: 58 BPM

## 2021-12-30 DIAGNOSIS — E03.9 ACQUIRED HYPOTHYROIDISM: ICD-10-CM

## 2021-12-30 DIAGNOSIS — M17.12 PRIMARY OSTEOARTHRITIS OF LEFT KNEE: Primary | ICD-10-CM

## 2021-12-30 PROCEDURE — 20610 DRAIN/INJ JOINT/BURSA W/O US: CPT | Performed by: INTERNAL MEDICINE

## 2021-12-30 PROCEDURE — 99214 OFFICE O/P EST MOD 30 MIN: CPT | Performed by: INTERNAL MEDICINE

## 2021-12-30 RX ORDER — TRIAMCINOLONE ACETONIDE 40 MG/ML
40 INJECTION, SUSPENSION INTRA-ARTICULAR; INTRAMUSCULAR ONCE
Status: COMPLETED | OUTPATIENT
Start: 2021-12-30 | End: 2021-12-30

## 2021-12-30 RX ADMIN — TRIAMCINOLONE ACETONIDE 40 MG: 40 INJECTION, SUSPENSION INTRA-ARTICULAR; INTRAMUSCULAR at 16:48

## 2022-01-04 ENCOUNTER — HOSPITAL ENCOUNTER (OUTPATIENT)
Dept: PAIN MEDICINE | Facility: HOSPITAL | Age: 62
Discharge: HOME | End: 2022-01-04

## 2022-01-04 ENCOUNTER — APPOINTMENT (OUTPATIENT)
Dept: PAIN MEDICINE | Facility: HOSPITAL | Age: 62
End: 2022-01-04

## 2022-01-04 VITALS
OXYGEN SATURATION: 99 % | TEMPERATURE: 97.3 F | WEIGHT: 250 LBS | RESPIRATION RATE: 16 BRPM | SYSTOLIC BLOOD PRESSURE: 144 MMHG | DIASTOLIC BLOOD PRESSURE: 77 MMHG | HEART RATE: 61 BPM | HEIGHT: 67 IN | BODY MASS INDEX: 39.24 KG/M2

## 2022-01-04 DIAGNOSIS — R52 PAIN: ICD-10-CM

## 2022-01-04 DIAGNOSIS — M51.36 DDD (DEGENERATIVE DISC DISEASE), LUMBAR: Primary | ICD-10-CM

## 2022-01-04 PROCEDURE — 62323 NJX INTERLAMINAR LMBR/SAC: CPT | Performed by: STUDENT IN AN ORGANIZED HEALTH CARE EDUCATION/TRAINING PROGRAM

## 2022-01-04 PROCEDURE — 0 IOPAMIDOL 41 % SOLUTION: Performed by: STUDENT IN AN ORGANIZED HEALTH CARE EDUCATION/TRAINING PROGRAM

## 2022-01-04 PROCEDURE — 77003 FLUOROGUIDE FOR SPINE INJECT: CPT

## 2022-01-04 PROCEDURE — 25010000002 METHYLPREDNISOLONE PER 80 MG: Performed by: STUDENT IN AN ORGANIZED HEALTH CARE EDUCATION/TRAINING PROGRAM

## 2022-01-04 RX ORDER — METHYLPREDNISOLONE ACETATE 80 MG/ML
80 INJECTION, SUSPENSION INTRA-ARTICULAR; INTRALESIONAL; INTRAMUSCULAR; SOFT TISSUE ONCE
Status: COMPLETED | OUTPATIENT
Start: 2022-01-04 | End: 2022-01-04

## 2022-01-04 RX ADMIN — METHYLPREDNISOLONE ACETATE 80 MG: 80 INJECTION, SUSPENSION INTRA-ARTICULAR; INTRALESIONAL; INTRAMUSCULAR; SOFT TISSUE at 14:16

## 2022-01-04 RX ADMIN — IOPAMIDOL 3 ML: 408 INJECTION, SOLUTION INTRATHECAL at 14:15

## 2022-01-04 NOTE — PROCEDURES
PREOPERATIVE DIAGNOSIS:    1. Lumbar DDD    POSTOPERATIVE DIAGNOSIS: Same    PROCEDURE:  Lumbar epidural steroid injection L5-S1    PROCEDURE NOTE:  After obtaining written informed consent patient was taken to the procedure room. Pre-procedure blood pressure and pulse were stable and recorded in patients clinic chart.     The patient was placed in the prone position. The lower back was prepped with antiseptic solution and draped in the usual sterile fashion.  The skin over the L5-S1 space was identified under fluoroscopic guidance and infiltrated with 1% lidocaine for local anesthesia via 25 gauge needle.  A 20-gauge tuohy needle was used to access the epidural space using loss of resistance to air technique. Following negative aspiration, 2 cc of the omnipaque dye was injected.  There was good spread of the dye from L3-L5 area. A mixture containing  3 ml of saline with 80 mg of depo-medrol was injected. There was no evidence of CSF, paresthesia or vascular spread. The needle was removed. Skin was cleaned and band aid was applied.    Following the procedure the patient's vital signs were stable. The patient was discharged home in good condition after being given discharge instructions.    COMPLICATIONS: None    Kalpesh Small DO  Pain Management   [facility]

## 2022-01-04 NOTE — H&P
H and P reviewed from previous visit and no changes to patient's clinical presentation. Will proceed with procedure as planned. Patient denies history of DM and being on blood thinners.    Kalpesh Small DO  Pain Management   [facility]

## 2022-01-04 NOTE — DISCHARGE INSTRUCTIONS
EPIDURAL STEROID INJECTION          An epidural steroid injection is a shot of steroid medicine and numbing medicine that is given into the space between the spinal cord and the bones of the back (epidural space).  The injection helps relieve pain by an irritated or swollen nerve root.    TELL YOUR HEALTH CARE PROVIDER ABOUT:  • Any allergies you have  • All medicines you are taking including any over the counter medicines  • Any blood disorders you have  • Any surgeries you have had  • Any medical conditions you have  • Whether you are pregnant or may be pregnant    WHAT ARE THE RISK?  Generally, this is a safe procedure. However,problems may occur, including  • Headache  • Bleeding  • Infection  • Allergic Reaction  • Nerve Damage    WHAT CAN I EXPECT AFTER THE PROCEDURE?    INJECTION SITE  • Remove the Band-Aid/s after 24 hours  • Check your injection site every day for signs of infection.  Check for:             Redness             Bleeding (small amt is normal)             Warmth             Pus or bad odor  • Some numbness may be experienced for several hours following the procedure.  • Avoid using heat on the injection site for 24 hours. You may use ice intermittently if needed by placing a         towel between your skin and the ice bag and using the ice for 20 minutes 2-3 times a day.  • Do not take baths, swim or use a hot tub for 24 hours.    ACTIVITY  • No strenuous activity for 24 hours then return to normal activity as tolerated.  • If your leg is numb, no driving until full sensation and strength has returned.    GENERAL INSTRUCTIONS:  • The injection site may feel numb, use ice with caution if numbness is present and no heat for 24 hours or until numbness is gone.   • If you have numbness or weakness in your arm or leg, use those areas with caution until normal sensation returns.  • It is not uncommon to notice an increase in discomfort or a change in the location of discomfort for 3-4 days after  the procedure.  If discomfort is noticed at the injection site, ice may be            applied to that area for 20 min 2-3 times a day.  • Take the pain medicine your physician has prescribed or over the counter pain relievers as long as you do not have any contraindications.  • If you are a diabetic, monitor your blood sugar closely.  The steroids used in your procedure may increase your blood sugar level up to 36 hours after the injection.  If your blood sugar is greater than 250, call the physician that helps you monitor your blood sugar.  • Keep all follow-up visits as scheduled by your health care provider. This is important.    CONTACT OUR OFFICE IF:  • You have any of these signs of infection            -Redness, swelling, or warmth around your injection site.            -Fluid or blood coming from your injection site (small amt of blood is normal)            -Pus or a bad odor from your injection site            -A fever  • You develop a severe headache or a stiff neck  • You lose control of your bladder or bowel movements      PAIN MANAGEMENT CENTER HOURS   • Monday-Friday 7:30 am. - 4:00 pm.  For any problem related to your procedure we can be reached at [phone]  • If you experience an emergency with your procedure, call [phone] or go to the emergency room.

## 2022-01-12 NOTE — PROGRESS NOTES
Subjective   More Staton is a 61 y.o. female is here for follow up for lower back pain. Patient was last seen on 1/4/2022 for LESI L5-S1 with 100% pain relief.  She has mild lower back pain and some numbness right foot, but she is able to work without much pain.     On last visit:     Lower back pain is 0/10 on VAS, at maximum 1/10.  Pain is aching, numbness in nature.  Pain is referred to R foot numbness.  Pain is constant.  Improved by nothing.  Worse with lifting, walking, standing.    Previous Injection:   1/4/2022 - LESI L5-S1 - 100% pain relief.     Hx: Referred by Kerry Singleton MD for lower back and knee pain.  She had lower back pain on and off, but radicular pain started several months after R knee surgery. Low back pain started April 2021 without any significant injury has been getting worse since then.  She has tried Celebrex, Tylenol arthritis, meloxicam without much relief.  Tramadol gave her headache.       PHQ-9- 4  SOAPP- 4     PMH:   Osteopenia, history of pancreatitis, right TKA-4/2021 by Dr. Cam, hysterectomy, bladder suspension, history of plantar fasciitis     Current Medications:   Gabapentin 300 mg nightly  Nabumetone         Past Medications:     Past Modalities:  TENS:                                                                          no                                                  Physical Therapy Within The Last 6 Months              PT- 4/2021- 8/2021   Psychotherapy                                                            no  Massage Therapy                                                       no     Patient Complains Of:  Uro-Fecal Incontinence          no  Weight Gain/Loss                   no  Fever/Chills                             no  Weakness                               no              Current Outpatient Medications:   •  cetirizine (zyrTEC) 10 MG tablet, TAKE ONE TABLET BY MOUTH DAILY, Disp: 90 tablet, Rfl: 3  •  furosemide (Lasix) 20 MG  tablet, Take 0.5 tablets by mouth Daily As Needed (swelling)., Disp: 20 tablet, Rfl: 1  •  gabapentin (NEURONTIN) 300 MG capsule, Take 1 capsule by mouth Every Night., Disp: 30 capsule, Rfl: 3  •  levothyroxine (SYNTHROID, LEVOTHROID) 112 MCG tablet, Take 112 mcg by mouth Daily. Take dos, Disp: , Rfl:   •  nabumetone (Relafen) 750 MG tablet, Take 1 tablet by mouth 2 (Two) Times a Day., Disp: 60 tablet, Rfl: 3    Current Facility-Administered Medications:   •  triamcinolone acetonide (KENALOG-40) injection 40 mg, 40 mg, Intra-articular, Once, Kerry Mayes MD    The following portions of the patient's history were reviewed and updated as appropriate: allergies, current medications, past family history, past medical history, past social history, past surgical history, and problem list.      REVIEW OF PERTINENT MEDICAL DATA    Past Medical History:   Diagnosis Date   • Allergic 03/2014   • Anemia     My whole life   • Arthritis    • Colon polyp 2019   • Concussion 1995   • Hypothyroidism 2015   • Leg pain, right    • Low back pain    • Osteopenia 2018   • Pancreatitis    • PONV (postoperative nausea and vomiting)     severe, ended up coming to ER post surgery   • Seasonal allergies    • Thyroid disease      Past Surgical History:   Procedure Laterality Date   • BLADDER SUSPENSION     • CHOLECYSTECTOMY     • COLONOSCOPY     • ENDOMETRIAL ABLATION     • EYE SURGERY     • HYSTERECTOMY     • JOINT REPLACEMENT  04/2021   • OOPHORECTOMY     • TONSILLECTOMY     • TOTAL KNEE ARTHROPLASTY Right 4/2/2021    Procedure: TOTAL KNEE ARTHROPLASTY;  Surgeon: Tarun Cam II, MD;  Location: [facility] MAIN OR;  Service: Orthopedics;  Laterality: Right;   • TUBAL ABDOMINAL LIGATION       Family History   Problem Relation Age of Onset   • Hyperlipidemia Mother    • Vision loss Mother    • Alcohol abuse Mother    • Stroke Daughter    • Cancer Paternal Aunt    • Arthritis Paternal Aunt    • COPD Paternal Uncle    •  "Arthritis Paternal Aunt    • Early death Paternal Aunt    • Cancer Paternal Aunt    • COPD Paternal Uncle    • Learning disabilities Daughter    • Stroke Daughter    • Arthritis Maternal Grandmother    • Cancer Paternal Grandmother    • COPD Father      Social History     Socioeconomic History   • Marital status:    Tobacco Use   • Smoking status: Former Smoker     Packs/day: 1.00     Years: 15.00     Pack years: 15.00     Types: Cigarettes     Start date:      Quit date:      Years since quittin.6   • Smokeless tobacco: Never Used   • Tobacco comment: Off and on use amount varies   Vaping Use   • Vaping Use: Never used   Substance and Sexual Activity   • Alcohol use: No   • Drug use: No   • Sexual activity: Not Currently     Partners: Male     Birth control/protection: None     Comment: Hysterectomy         Review of Systems      Vitals:    22 1338   BP: 166/52   Pulse: 67   Resp: 16   SpO2: 97%   Weight: 113 kg (250 lb)   Height: 170.2 cm (67\")   PainSc: 0-No pain         Objective   Physical Exam  Musculoskeletal:         General: Tenderness present.        Legs:    Neurological:      Deep Tendon Reflexes:      Reflex Scores:       Patellar reflexes are 2+ on the right side and 2+ on the left side.       Achilles reflexes are 2+ on the right side and 2+ on the left side.     Comments: Motor strength 5/5 b/l LE  Sensory intact b/l LE             Imaging Reviewed:  MRI lumbar spine-2021  L1-L4-diffuse disc osteophyte complex.  Mild indentation of thecal sac and mild bilateral neural foraminal narrowing.  B4-E7-zudpavjeb facet hypertrophy with intervertebral disc desiccation.  - Mild multilevel lumbar spondylosis.    X-ray lumbar spine-2021  -Large amount of bilateral lower lumbar spine facet degenerative changes greatest at right L4-5 and L5-S1.  Mild disc space narrowing at multiple levels.    Left knee x-ray-2021  Advanced osteoarthritis of the patellofemoral joint.  " Joint space narrowing.      Assessment:    1. DDD (degenerative disc disease), lumbar    2. Lumbar spondylosis         Plan:     1.  Defer UDS for now.  2.  Excellent relief from LESI L5-S1. Will repeat as needed in future.   3.  Continue gabapentin and NSAIDs as prescribed currently.  4. Continue to follow up with Dr. Cam for R knee pain.        RTC as needed.      Kalpesh Small DO  Pain Management   [facility]                 INSPECT REPORT    As part of the patient's treatment plan, I may be prescribing controlled substances. The patient has been made aware of appropriate use of such medications, including potential risk of somnolence, limited ability to drive and/or work safely, and the potential for dependence or overdose. It has also been made clear that these medications are for use by this patient only, without concomitant use of alcohol or other substances unless prescribed.     Patient has completed prescribing agreement detailing terms of continued prescribing of controlled substances, including monitoring INSPECT reports, urine drug screening, and pill counts if necessary. The patient is aware that inappropriate use will results in cessation of prescribing such medications.    INSPECT report has been reviewed and scanned into the patient's chart.

## 2022-01-17 ENCOUNTER — OFFICE VISIT (OUTPATIENT)
Dept: PAIN MEDICINE | Facility: CLINIC | Age: 62
End: 2022-01-17

## 2022-01-17 VITALS
HEART RATE: 67 BPM | HEIGHT: 67 IN | WEIGHT: 250 LBS | OXYGEN SATURATION: 97 % | BODY MASS INDEX: 39.24 KG/M2 | DIASTOLIC BLOOD PRESSURE: 52 MMHG | RESPIRATION RATE: 16 BRPM | SYSTOLIC BLOOD PRESSURE: 166 MMHG

## 2022-01-17 DIAGNOSIS — M47.816 LUMBAR SPONDYLOSIS: ICD-10-CM

## 2022-01-17 DIAGNOSIS — M51.36 DDD (DEGENERATIVE DISC DISEASE), LUMBAR: Primary | ICD-10-CM

## 2022-01-17 PROCEDURE — 99213 OFFICE O/P EST LOW 20 MIN: CPT | Performed by: STUDENT IN AN ORGANIZED HEALTH CARE EDUCATION/TRAINING PROGRAM

## 2022-03-24 RX ORDER — GABAPENTIN 300 MG/1
CAPSULE ORAL
Qty: 30 CAPSULE | Refills: 3 | Status: SHIPPED | OUTPATIENT
Start: 2022-03-24 | End: 2022-08-08

## 2022-05-04 ENCOUNTER — OFFICE VISIT (OUTPATIENT)
Dept: ORTHOPEDIC SURGERY | Facility: CLINIC | Age: 62
End: 2022-05-04

## 2022-05-04 VITALS — WEIGHT: 250 LBS | HEIGHT: 67 IN | BODY MASS INDEX: 39.24 KG/M2

## 2022-05-04 DIAGNOSIS — Z96.651 HX OF TOTAL KNEE ARTHROPLASTY, RIGHT: Primary | ICD-10-CM

## 2022-05-04 PROCEDURE — 99204 OFFICE O/P NEW MOD 45 MIN: CPT | Performed by: ORTHOPAEDIC SURGERY

## 2022-05-04 NOTE — PROGRESS NOTES
"Chief Complaint  Establish Care of the Right Knee    Subjective    History of Present Illness      More Staton is a 61 y.o. female who presents to [facility] for a second opinion on her right knee and her right foot pain.  History of Present Illness     The patient presents to the clinic today in regards to she is very unhappy with her index surgeon, Dr. Osito Cam. She underwent a right total knee arthroplasty performed by Dr. Cam 13 months ago on 04/02/2021. She has continued to have pain and discomfort on the medial aspect of her right knee. Her pain is exacerbated with work and going up and down stairs. The patient was further evaluated by Dr. Cam, and expresses she was very unhappy how she was treated because she states he just \"gave me the run around and then suggested a revision surgery.\"    The patient states that the knee has been bothersome, but her mobility has somewhat improved with physical therapy. She works in a retail section of a grocery store, and by the end of the day she is ambulating with a significant antalgic gait.  In fact, because of her altered gait patterns caused by the knee replacement surgery, she states that she has now developed plantar fasciitis, and has exacerbated her limp. She has looked for nonoperative management of the plantar fasciitis, and continues to be frustrated because of the lack of mobility caused by a combination of the knee arthroplasty related pain and the plantar fasciitis. The patient is concerned about the fact that she is unable to bend her knee beyond 90 to 95 degrees. Occasionally, with physical therapy, she can go up to 100 degrees of flexion, but it is rare.    Pain Location:  RIGHT knee  Radiation: none  Quality: dull, aching, stabbing  Intensity/Severity: moderate severe  Duration: approximately 13  months  Progression of symptoms: yes, progressive worsening  Onset quality: gradual   Timing: constant  Aggravating " "Factors: deep flexion  Alleviating Factors: NSAIDs  Previous Episodes: yes  Associated Symptoms: pain, decreased ROM  ADLs Affected: yes  Previous Treatment: physical/occupational therapy and prior surgery       Objective   Vital Signs:   Ht 170.2 cm (67\")   Wt 113 kg (250 lb)   BMI 39.16 kg/m²     Physical Exam  Physical Exam  Vitals signs and nursing note reviewed.   Constitutional:       Appearance: Normal appearance.   Pulmonary:      Effort: Pulmonary effort is normal.   Skin:     General: Skin is warm and dry.      Capillary Refill: Capillary refill takes less than 2 seconds.   Neurological:      General: No focal deficit present.      Mental Status: He is alert and oriented to person, place, and time. Mental status is at baseline.   Psychiatric:         Mood and Affect: Mood normal.         Behavior: Behavior normal.         Thought Content: Thought content normal.         Judgment: Judgment normal.     Ortho Exam   Right foot-plantar fasciitis. The patient has exquisite pain and tenderness over the calcaneal aspect of the plantar fascia at its origin. Dorsiflexion of the toe causes a lot of pain and discomfort. Patient has pain and discomfort along the medial ray of the plantar fascia. Longitudinal arches of the foot are poorly developed. The hindfoot has a mild amount of valgus. Avtar's test is positive. Patient has a very tender and painful heel strike during the gait phase.  There are no signs of a stress fracture of the calcaneus. Achilles tendon mechanism is well preserved.     Right knee.The patient is status post total knee arthroplasty postoperative 13 month(s). Incision is clean. Calf is soft and nontender. Homans sign is negative. There is no clicking, popping or catching. Anterior and posterior drawer signs are negative.  There is no instability of the components. Appropriate amounts of swelling and bruising are noted. Dorsalis pedis and posterior tibial artery pulses are palpable. Common " peroneal nerve function is well preserved. Range of motion is from 0-100 degrees of flexion. Gait is cautious but otherwise fairly normal. There is no evidence of a deep seated joint infection.        Result Review :   The following data was reviewed by: Ethan Wall MD on 05/04/2022:  Radiologic studies - see below for interpretation  RIGHT knee xrays  weightbearing/standing ap/lateral views were performed at [facility], [place] on 05/04/22. Images were independently viewed and interpreted by myself, my impression as follows:    right Knee X-Ray  Indication: Evaluation of implant position and cause of pain after total knee arthroplasty.  AP, Lateral views  Findings: The implant position is noted.  There is lucency around the lateral aspect of the tibial stem component as well as some lucency on the medial base plate around the proximal tibia.  This could be reflective of a valgus overload causing selective loosening of the tibial component leading to pain.  The femoral component appears to be well-placed.  no bony lesion  Soft tissues within normal limits  within normal limits joint spaces  Hardware appropriately positioned yes      no prior studies available for comparison.        X-RAY was ordered and reviewed by Ethan Wall MD        Procedures           Assessment   Assessment and Plan    Diagnoses and all orders for this visit:    1. Hx of total knee arthroplasty, right (Primary)  -     XR Knee 3 View Right  -     Ambulatory Referral to Physical Therapy Evaluate and treat          Follow Up   · Compression/brace follow-up and plan is application of a supportive brace compression brace to the knee to prevent it from buckling and giving out..  · I will be glad to get her full set of blood work for inflammatory markers including a CBC with differential, sedimentation rate and a C-reactive protein.  · Tablet meloxicam 7.5 mg tab 1 p.o. nightly for pain swelling or discomfort.  · Local physical therapy given to the  patient to improve the range of motion beyond 100 degrees.  · Use a supportive brace at night to minimize the symptoms of plantar fasciitis.  · A triple phase bone scan will definitely be indicated for this patient to make sure that the tibial component is not working itself loose requiring secondary surgical intervention in the form of revision surgery.  · At this point I would not recommend surgery for the patient because she is dealing with plantar fasciitis and other issues with mobility.  · The patient's BMI is 39.16 and her body weight is 250 pounds and we had an extensive discussion with the patient about losing body weight with reducing portion sizes, consistent carbohydrate consumption, active exercises, dietary consultation with a registered dietitian and other modalities to help reduce her body weight which would ultimately affect in a positive since her plantar fasciitis and the knee arthritis related pain especially related to the implant.  · Rest, ice, compression, and elevation (RICE) therapy  · Stretching and strengthening exercises of the quads is already stretching and strengthening exercises of the quads and hamstrings.  And that is the end of the dictation on Brionna Aguero  · OTC Alternate Ibuprofen and Tylenol as needed  · Follow up in 6 week(s)  • Patient was given instructions and counseling regarding her condition or for health maintenance advice. Please see specific information pulled into the AVS if appropriate.   • Application of a supportive compression brace to the knee to prevent it from buckling and giving out.  • I will be glad to get her a full set of blood work for inflammatory markers including a CBC with differential, sedimentation rate, and C-reactive protein.  • Tablet meloxicam 7.5 mg tab 1 PO QHS for pain, swelling, and discomfort.  • A note for physical therapy was provided for the patient to improve the range of motion beyond 100 degrees.  • Use a supportive brace at  night to minimize the symptoms of plantar fasciitis.  • A triple phase bone scan will be indicated for this patient to make sure that the tibial component is not working itself loose requiring second surgical intervention in the form of revision surgery.  • At this point, I would not recommend surgery for the patient because she is dealing with plantar fasciitis and other issues with mobility.  • The patient's BMI is 39.16. Her body weight is 250 pounds and we had an extensive discussion with the patient about losing body weight with reducing portion sizes, consistent carbohydrate consumption, active exercises, dietary consultation with a registered dietitian, and other modalities to help reduce her body weight which would ultimately affect in a positive sense of her plantar fasciitis and the knee arthritis related pain, especially related to the implant.  • Stretching and strengthening exercises of the quads and hamstrings.    Ethan Wall MD   Date of Encounter: 5/4/2022       EMR Dragon/Transcription disclaimer:  Much of this encounter note is an electronic transcription/translation of spoken language to printed text. The electronic translation of spoken language may permit erroneous, or at times, nonsensical words or phrases to be inadvertently transcribed; Although I have reviewed the note for such errors, some may still exist.     Transcribed from ambient dictation for Ethan Wall MD by Autumn Rivera.  05/12/22   13:12 EDT    Patient verbalized consent to the visit recording.  I have personally performed the services described in this document as transcribed by the above individual, and it is both accurate and complete.  Ethan Wall MD  5/12/2022  13:17 EDT

## 2022-05-10 ENCOUNTER — TELEPHONE (OUTPATIENT)
Dept: ORTHOPEDIC SURGERY | Facility: CLINIC | Age: 62
End: 2022-05-10

## 2022-05-10 NOTE — TELEPHONE ENCOUNTER
Call placed to  Jose Rose PT, s/w Susan, to ask if we need a new order for patient to have water therapy. States if in as Eval patient can let PT know when she is doing the Eval and they will get her set up.     Call placed to patient advised as per Susan, patient expressed understanding that thanked us for calling.

## 2022-05-10 NOTE — TELEPHONE ENCOUNTER
Caller: PATIENT    Relationship to patient: SELF     Best call back number: [phone]    Patient is needing: PATIENT WAS CALLING TO SEE IF DR. STANTON COULD PUT IN AN ORDER FOR HER PHYSICAL THERAPY TO BE WATER THERAPY. PATIENT STATES LAST YEAR WHEN SHE DID PHYSICAL THERAPY THE THERAPIST MENTIONED IF SHE EVER DID MORE PHYSICAL THERAPY THAT SHOULD SHE REQUEST WATER THERAPY SO PATIENT IS NOW CALLING TO REQUEST THAT. PATIENT STATES THE THERAPY IS FOR HER RIGHT KNEE. THANK YOU!

## 2022-05-16 ENCOUNTER — DOCUMENTATION (OUTPATIENT)
Dept: PHYSICAL THERAPY | Facility: CLINIC | Age: 62
End: 2022-05-16

## 2022-05-16 NOTE — PROGRESS NOTES
Discharge Summary  Discharge Summary from Physical Therapy Report       Patient: More Staton   : 1960  Diagnosis/ICD-10 Code:  Status post total right knee replacement [Z96.651]  Referring practitioner: Tarun Cam*  Date of Initial Visit: 2021      Dates  PT visit: 21 - 21  Number of Visits: 20 vists    Discharge Status of Patient: See last progress note for status sent on 21    Goals: Partially Met    Discharge Plan: Continue with current home exercise program as instructed  Future need for rehabilitation activities  Patient to return to referring/providing physician      Date of Discharge 22        Michi Juarez, PT  Physical Therapist

## 2022-05-27 RX ORDER — NABUMETONE 750 MG/1
TABLET, FILM COATED ORAL
Qty: 60 TABLET | Refills: 3 | Status: SHIPPED | OUTPATIENT
Start: 2022-05-27 | End: 2023-02-16

## 2022-08-08 RX ORDER — GABAPENTIN 300 MG/1
CAPSULE ORAL
Qty: 30 CAPSULE | Refills: 3 | Status: SHIPPED | OUTPATIENT
Start: 2022-08-08 | End: 2023-03-21

## 2022-08-16 ENCOUNTER — OFFICE VISIT (OUTPATIENT)
Dept: FAMILY MEDICINE CLINIC | Facility: CLINIC | Age: 62
End: 2022-08-16

## 2022-08-16 VITALS
SYSTOLIC BLOOD PRESSURE: 162 MMHG | OXYGEN SATURATION: 97 % | WEIGHT: 232 LBS | DIASTOLIC BLOOD PRESSURE: 74 MMHG | HEIGHT: 67 IN | RESPIRATION RATE: 15 BRPM | HEART RATE: 60 BPM | BODY MASS INDEX: 36.41 KG/M2 | TEMPERATURE: 97.1 F

## 2022-08-16 DIAGNOSIS — S39.012A STRAIN OF LUMBAR REGION, INITIAL ENCOUNTER: Primary | ICD-10-CM

## 2022-08-16 DIAGNOSIS — I10 HYPERTENSION, UNSPECIFIED TYPE: ICD-10-CM

## 2022-08-16 PROCEDURE — 99213 OFFICE O/P EST LOW 20 MIN: CPT | Performed by: NURSE PRACTITIONER

## 2022-08-16 RX ORDER — PREDNISONE 20 MG/1
TABLET ORAL
Qty: 18 TABLET | Refills: 0 | Status: SHIPPED | OUTPATIENT
Start: 2022-08-16 | End: 2023-03-21

## 2022-08-16 RX ORDER — BACLOFEN 10 MG/1
10 TABLET ORAL 3 TIMES DAILY
Qty: 30 TABLET | Refills: 1 | Status: SHIPPED | OUTPATIENT
Start: 2022-08-16 | End: 2023-03-21

## 2022-08-16 NOTE — PROGRESS NOTES
"Chief Complaint  Muscle Pain (In back)  Subjective        More Staton presents to [facility]  Pt comes in today with c/o lower back pain. Started about 2 weeks ago after doing some yard work and gardening. Has been consistent since then. Feels like muscular strain. 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 Not getting better as she has been active. Radiates down left leg. No numbness or tingling in leg. Sitting makes pain worse. Heat has helped.     Back Pain  This is a recurrent problem. The current episode started 1 to 4 weeks ago. The problem has been gradually worsening since onset. The pain is present in the sacro-iliac. The quality of the pain is described as aching. The pain is at a severity of 7/10. The pain is the same all the time. The symptoms are aggravated by position, sitting and twisting. Stiffness is present all day. Pertinent negatives include no abdominal pain, bladder incontinence, bowel incontinence, chest pain, dysuria, fever, headaches, leg pain, numbness, paresis, paresthesias, pelvic pain, perianal numbness, tingling, weakness or weight loss. Risk factors include obesity and poor posture.     Review of Systems   Constitutional: Negative for fever and unexpected weight loss.   Cardiovascular: Negative for chest pain.   Gastrointestinal: Negative for abdominal pain and bowel incontinence.   Genitourinary: Negative for dysuria, pelvic pain and urinary incontinence.   Musculoskeletal: Positive for back pain.   Neurological: Negative for tingling, weakness, numbness and paresthesias.     Objective     Vital Signs:   /74   Pulse 60   Temp 97.1 °F (36.2 °C)   Resp 15   Ht 170.2 cm (67\")   Wt 105 kg (232 lb)   SpO2 97%   BMI 36.34 kg/m²       BP Readings from Last 3 Encounters:   08/16/22 162/74   01/17/22 166/52   01/04/22 144/77       Wt Readings from Last 3 Encounters:   08/16/22 105 kg (232 lb)   05/04/22 113 kg (250 lb)   01/17/22 113 kg (250 lb) "     Physical Exam  Constitutional:       Appearance: She is well-developed.   Eyes:      Pupils: Pupils are equal, round, and reactive to light.   Cardiovascular:      Rate and Rhythm: Normal rate and regular rhythm.   Pulmonary:      Effort: Pulmonary effort is normal.      Breath sounds: Normal breath sounds.   Musculoskeletal:      Lumbar back: Tenderness (paraspinal ) present. No bony tenderness. Decreased range of motion. Negative right straight leg raise test and negative left straight leg raise test.      Comments: Muscular pain    Neurological:      Mental Status: She is alert and oriented to person, place, and time.        Result Review :                 Assessment and Plan    Diagnoses and all orders for this visit:    1. Strain of lumbar region, initial encounter (Primary)  -     predniSONE (DELTASONE) 20 MG tablet; 3 po x 3 days, 2 po x 3 days, 1 po x 3 days  Dispense: 18 tablet; Refill: 0  -     baclofen (LIORESAL) 10 MG tablet; Take 1 tablet by mouth 3 (Three) Times a Day.  Dispense: 30 tablet; Refill: 1    2. Hypertension, unspecified type  Assessment & Plan:  Hypertension is worsening.  Dietary sodium restriction.  Weight loss.  Regular aerobic exercise.  Ambulatory blood pressure monitoring.  pt refuses medication at this time. she believes she has white coat syndrome. advised pt to monitor at home and follow up in 2 weeks for BP check   Blood pressure will be reassessed in 2 weeks.    prednisone taper  Baclofen prn  Ice/moist heat  Stretches  Discussed BP medication, but pt refuses. See note above.   During this office visit, we discussed the pertinent aspects of the visit and treatment recommendations. Pt verbalizes understanding. Follow up was discussed. Patient was given the opportunity to ask questions and discuss other concerns.   Discussed importance of regular exercise and recommended starting or continuing a regular exercise program for good health. The patient was also encouraged to lose  weight for better health.         Follow Up   Return if symptoms worsen or fail to improve.  Patient was given instructions and counseling regarding her condition or for health maintenance advice. Please see specific information pulled into the AVS if appropriate.       Answers for HPI/ROS submitted by the patient on 8/16/2022  What is the primary reason for your visit?: Back Pain

## 2022-08-16 NOTE — ASSESSMENT & PLAN NOTE
Hypertension is worsening.  Dietary sodium restriction.  Weight loss.  Regular aerobic exercise.  Ambulatory blood pressure monitoring.  pt refuses medication at this time. she believes she has white coat syndrome. advised pt to monitor at home and follow up in 2 weeks for BP check   Blood pressure will be reassessed in 2 weeks.

## 2022-10-21 ENCOUNTER — OFFICE (AMBULATORY)
Dept: URBAN - METROPOLITAN AREA PATHOLOGY 4 | Facility: PATHOLOGY | Age: 62
End: 2022-10-21

## 2022-10-21 ENCOUNTER — OFFICE (AMBULATORY)
Dept: URBAN - METROPOLITAN AREA PATHOLOGY 4 | Facility: PATHOLOGY | Age: 62
End: 2022-10-21
Payer: COMMERCIAL

## 2022-10-21 ENCOUNTER — ON CAMPUS - OUTPATIENT (AMBULATORY)
Dept: URBAN - METROPOLITAN AREA HOSPITAL 2 | Facility: HOSPITAL | Age: 62
End: 2022-10-21

## 2022-10-21 VITALS
SYSTOLIC BLOOD PRESSURE: 148 MMHG | SYSTOLIC BLOOD PRESSURE: 146 MMHG | DIASTOLIC BLOOD PRESSURE: 72 MMHG | SYSTOLIC BLOOD PRESSURE: 133 MMHG | TEMPERATURE: 97.8 F | HEART RATE: 62 BPM | OXYGEN SATURATION: 99 % | SYSTOLIC BLOOD PRESSURE: 142 MMHG | OXYGEN SATURATION: 100 % | DIASTOLIC BLOOD PRESSURE: 85 MMHG | RESPIRATION RATE: 18 BRPM | SYSTOLIC BLOOD PRESSURE: 127 MMHG | HEART RATE: 67 BPM | SYSTOLIC BLOOD PRESSURE: 173 MMHG | OXYGEN SATURATION: 98 % | SYSTOLIC BLOOD PRESSURE: 158 MMHG | HEIGHT: 67 IN | HEART RATE: 68 BPM | HEART RATE: 60 BPM | DIASTOLIC BLOOD PRESSURE: 66 MMHG | DIASTOLIC BLOOD PRESSURE: 61 MMHG | SYSTOLIC BLOOD PRESSURE: 122 MMHG | DIASTOLIC BLOOD PRESSURE: 74 MMHG | HEART RATE: 65 BPM | HEART RATE: 63 BPM | DIASTOLIC BLOOD PRESSURE: 63 MMHG | RESPIRATION RATE: 16 BRPM | OXYGEN SATURATION: 97 % | DIASTOLIC BLOOD PRESSURE: 64 MMHG | SYSTOLIC BLOOD PRESSURE: 136 MMHG | DIASTOLIC BLOOD PRESSURE: 58 MMHG | HEART RATE: 53 BPM | SYSTOLIC BLOOD PRESSURE: 135 MMHG | WEIGHT: 213 LBS

## 2022-10-21 DIAGNOSIS — K57.30 DIVERTICULOSIS OF LARGE INTESTINE WITHOUT PERFORATION OR ABS: ICD-10-CM

## 2022-10-21 DIAGNOSIS — D12.3 BENIGN NEOPLASM OF TRANSVERSE COLON: ICD-10-CM

## 2022-10-21 DIAGNOSIS — D12.0 BENIGN NEOPLASM OF CECUM: ICD-10-CM

## 2022-10-21 DIAGNOSIS — D12.2 BENIGN NEOPLASM OF ASCENDING COLON: ICD-10-CM

## 2022-10-21 DIAGNOSIS — Z86.010 PERSONAL HISTORY OF COLONIC POLYPS: ICD-10-CM

## 2022-10-21 PROBLEM — K63.5 POLYP OF COLON: Status: ACTIVE | Noted: 2022-10-21

## 2022-10-21 LAB
GI HISTOLOGY: A. UNSPECIFIED: (no result)
GI HISTOLOGY: B. UNSPECIFIED: (no result)
GI HISTOLOGY: C. ILEOCECAL VALVE: (no result)
GI HISTOLOGY: D. UNSPECIFIED: (no result)
GI HISTOLOGY: E. UNSPECIFIED: (no result)
GI HISTOLOGY: PDF REPORT: (no result)

## 2022-10-21 PROCEDURE — 88305 TISSUE EXAM BY PATHOLOGIST: CPT | Mod: 26 | Performed by: INTERNAL MEDICINE

## 2022-10-21 PROCEDURE — 45385 COLONOSCOPY W/LESION REMOVAL: CPT | Mod: 33 | Performed by: INTERNAL MEDICINE

## 2022-10-21 PROCEDURE — 45381 COLONOSCOPY SUBMUCOUS NJX: CPT | Performed by: INTERNAL MEDICINE

## 2022-10-27 ENCOUNTER — TRANSCRIBE ORDERS (OUTPATIENT)
Dept: ADMINISTRATIVE | Facility: HOSPITAL | Age: 62
End: 2022-10-27

## 2022-10-27 DIAGNOSIS — Z12.31 SCREENING MAMMOGRAM, ENCOUNTER FOR: Primary | ICD-10-CM
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
## 2022-10-31 ENCOUNTER — OFFICE VISIT (OUTPATIENT)
Dept: PAIN MEDICINE | Facility: CLINIC | Age: 62
End: 2022-10-31

## 2022-10-31 VITALS
DIASTOLIC BLOOD PRESSURE: 55 MMHG | SYSTOLIC BLOOD PRESSURE: 121 MMHG | RESPIRATION RATE: 16 BRPM | OXYGEN SATURATION: 99 % | HEART RATE: 63 BPM

## 2022-10-31 DIAGNOSIS — M25.561 RECURRENT PAIN OF RIGHT KNEE: ICD-10-CM

## 2022-10-31 DIAGNOSIS — M53.3 SACROILIAC JOINT DYSFUNCTION: ICD-10-CM

## 2022-10-31 DIAGNOSIS — M47.816 LUMBAR SPONDYLOSIS: ICD-10-CM

## 2022-10-31 DIAGNOSIS — M51.36 DDD (DEGENERATIVE DISC DISEASE), LUMBAR: Primary | ICD-10-CM

## 2022-10-31 PROCEDURE — 99214 OFFICE O/P EST MOD 30 MIN: CPT | Performed by: STUDENT IN AN ORGANIZED HEALTH CARE EDUCATION/TRAINING PROGRAM

## 2022-11-15 ENCOUNTER — HOSPITAL ENCOUNTER (OUTPATIENT)
Dept: PAIN MEDICINE | Facility: HOSPITAL | Age: 62
Discharge: HOME | End: 2022-11-15

## 2022-11-15 VITALS
OXYGEN SATURATION: 100 % | BODY MASS INDEX: 32.18 KG/M2 | DIASTOLIC BLOOD PRESSURE: 75 MMHG | RESPIRATION RATE: 16 BRPM | HEIGHT: 67 IN | SYSTOLIC BLOOD PRESSURE: 140 MMHG | TEMPERATURE: 97.1 F | HEART RATE: 48 BPM | WEIGHT: 205 LBS

## 2022-11-15 DIAGNOSIS — R52 PAIN: ICD-10-CM

## 2022-11-15 DIAGNOSIS — M51.36 DDD (DEGENERATIVE DISC DISEASE), LUMBAR: ICD-10-CM

## 2022-11-15 PROCEDURE — 62323 NJX INTERLAMINAR LMBR/SAC: CPT | Performed by: STUDENT IN AN ORGANIZED HEALTH CARE EDUCATION/TRAINING PROGRAM

## 2022-11-15 PROCEDURE — 77003 FLUOROGUIDE FOR SPINE INJECT: CPT

## 2022-11-15 PROCEDURE — 0 IOPAMIDOL 41 % SOLUTION: Performed by: STUDENT IN AN ORGANIZED HEALTH CARE EDUCATION/TRAINING PROGRAM

## 2022-11-15 PROCEDURE — 25010000002 METHYLPREDNISOLONE PER 80 MG: Performed by: STUDENT IN AN ORGANIZED HEALTH CARE EDUCATION/TRAINING PROGRAM

## 2022-11-15 RX ORDER — METHYLPREDNISOLONE ACETATE 80 MG/ML
80 INJECTION, SUSPENSION INTRA-ARTICULAR; INTRALESIONAL; INTRAMUSCULAR; SOFT TISSUE ONCE
Status: COMPLETED | OUTPATIENT
Start: 2022-11-15 | End: 2022-11-15

## 2022-11-15 RX ADMIN — IOPAMIDOL 3 ML: 408 INJECTION, SOLUTION INTRATHECAL at 14:54

## 2022-11-15 RX ADMIN — METHYLPREDNISOLONE ACETATE 80 MG: 80 INJECTION, SUSPENSION INTRA-ARTICULAR; INTRALESIONAL; INTRAMUSCULAR; SOFT TISSUE at 14:55

## 2022-11-15 NOTE — H&P
H and P reviewed from previous visit and no changes to patient's clinical presentation. Will proceed with procedure as planned. Patient denies history of DM and being on blood thinners.    Kalpesh Small DO  Pain Management   [facility]

## 2022-11-15 NOTE — DISCHARGE INSTRUCTIONS

## 2022-11-15 NOTE — PROCEDURES
PREOPERATIVE DIAGNOSIS:    1. Lumbar DDD    POSTOPERATIVE DIAGNOSIS: Same    PROCEDURE:  Lumbar epidural steroid injection L5-S1    PROCEDURE NOTE:  After obtaining written informed consent patient was taken to the procedure room. Pre-procedure blood pressure and pulse were stable and recorded in patients clinic chart.     The patient was placed in the prone position. The lower back was prepped with antiseptic solution and draped in the usual sterile fashion.  The skin over the L5-S1 space was identified under fluoroscopic guidance and infiltrated with 1% lidocaine for local anesthesia via 25 gauge needle.  A 20-gauge tuohy needle was used to access the epidural space using loss of resistance to air technique. Following negative aspiration, 2 cc of the omnipaque dye was injected.  There was good spread of the dye from L3-L5 area. A mixture containing  3 ml of saline with 80 mg of depo-medrol was injected. There was no evidence of CSF, paresthesia or vascular spread. The needle was removed. Skin was cleaned and band aid was applied.    Following the procedure the patient's vital signs were stable. The patient was discharged home in good condition after being given discharge instructions.    COMPLICATIONS: None    Kalpesh Small DO  Pain Management   [facility]

## 2022-11-16 ENCOUNTER — TELEPHONE (OUTPATIENT)
Dept: PAIN MEDICINE | Facility: HOSPITAL | Age: 62
End: 2022-11-16

## 2022-12-02 ENCOUNTER — HOSPITAL ENCOUNTER (OUTPATIENT)
Dept: MAMMOGRAPHY | Facility: HOSPITAL | Age: 62
Discharge: HOME | End: 2022-12-02
Admitting: INTERNAL MEDICINE

## 2022-12-02 DIAGNOSIS — Z12.31 SCREENING MAMMOGRAM, ENCOUNTER FOR: ICD-10-CM

## 2022-12-02 PROCEDURE — 77067 SCR MAMMO BI INCL CAD: CPT

## 2022-12-02 PROCEDURE — 77063 BREAST TOMOSYNTHESIS BI: CPT

## 2022-12-20 ENCOUNTER — HOSPITAL ENCOUNTER (OUTPATIENT)
Dept: PAIN MEDICINE | Facility: HOSPITAL | Age: 62
Discharge: HOME | End: 2022-12-20

## 2022-12-20 VITALS
HEART RATE: 57 BPM | SYSTOLIC BLOOD PRESSURE: 126 MMHG | WEIGHT: 197 LBS | DIASTOLIC BLOOD PRESSURE: 87 MMHG | RESPIRATION RATE: 16 BRPM | HEIGHT: 67 IN | BODY MASS INDEX: 30.92 KG/M2 | OXYGEN SATURATION: 100 % | TEMPERATURE: 97.7 F

## 2022-12-20 DIAGNOSIS — M25.561 RECURRENT PAIN OF RIGHT KNEE: ICD-10-CM

## 2022-12-20 DIAGNOSIS — R52 PAIN: ICD-10-CM

## 2022-12-20 PROCEDURE — 25010000002 METHYLPREDNISOLONE PER 40 MG: Performed by: STUDENT IN AN ORGANIZED HEALTH CARE EDUCATION/TRAINING PROGRAM

## 2022-12-20 PROCEDURE — 77003 FLUOROGUIDE FOR SPINE INJECT: CPT

## 2022-12-20 PROCEDURE — 64454 NJX AA&/STRD GNCLR NRV BRNCH: CPT | Performed by: STUDENT IN AN ORGANIZED HEALTH CARE EDUCATION/TRAINING PROGRAM

## 2022-12-20 RX ORDER — METHYLPREDNISOLONE ACETATE 40 MG/ML
40 INJECTION, SUSPENSION INTRA-ARTICULAR; INTRALESIONAL; INTRAMUSCULAR; SOFT TISSUE ONCE
Status: COMPLETED | OUTPATIENT
Start: 2022-12-20 | End: 2022-12-20

## 2022-12-20 RX ORDER — BUPIVACAINE HYDROCHLORIDE 2.5 MG/ML
10 INJECTION, SOLUTION EPIDURAL; INFILTRATION; INTRACAUDAL ONCE
Status: COMPLETED | OUTPATIENT
Start: 2022-12-20 | End: 2022-12-20

## 2022-12-20 RX ORDER — LIDOCAINE HYDROCHLORIDE 10 MG/ML
5 INJECTION, SOLUTION EPIDURAL; INFILTRATION; INTRACAUDAL; PERINEURAL ONCE
Status: COMPLETED | OUTPATIENT
Start: 2022-12-20 | End: 2022-12-20

## 2022-12-20 RX ADMIN — LIDOCAINE HYDROCHLORIDE 5 ML: 10 INJECTION, SOLUTION EPIDURAL; INFILTRATION; INTRACAUDAL; PERINEURAL at 13:21

## 2022-12-20 RX ADMIN — METHYLPREDNISOLONE ACETATE 40 MG: 40 INJECTION, SUSPENSION INTRA-ARTICULAR; INTRALESIONAL; INTRAMUSCULAR; INTRASYNOVIAL; SOFT TISSUE at 13:26

## 2022-12-20 RX ADMIN — BUPIVACAINE HYDROCHLORIDE 5 ML: 2.5 INJECTION, SOLUTION EPIDURAL; INFILTRATION; INTRACAUDAL; PERINEURAL at 13:25

## 2022-12-20 NOTE — PROCEDURES
Preoperative Diagnosis: Osteoarthritis Knee- Right    Postoperative Diagnosis: Same    Procedure:  Genicular Nerve Block- SL, SM, IM    Procedure Details: The patient's chart was reviewed.  After obtaining written informed consent, the patient was placed in a supine position with the right knee exposed.  The skin overlying the right knee was cleaned with chloraprep times two. A 25 g needle was used to anesthetize the skin with 1% lidocaine on the superior medial, superior lateral part of the femur. A 22 g spinal needle was introduced under fluoro and guided into supero lateral and supero medial part of femur. The needle was advanced in the lateral view to the two third of femur. Another needle was placed in the infero medial part of the knee along the tibia under fluoro. After the needle confirmation in lateral view 3 cc of 0.25% marcaine was injected in each needle for a total of 9 cc of 0.25% marcaine with 40 mg of depo medrol. The needles were removed and bandaid applied.     Following the procedure the patient's vital signs were stable. The patient was discharged home in good condition after being given discharge instructions.    COMPLICATIONS None    100% pain relief post procedure.    Kalpesh Small DO  Pain Management   [facility]

## 2022-12-20 NOTE — DISCHARGE INSTRUCTIONS
Genicular Nerve Block    Genicular nerve block is an injection of numbing medicine (regional anesthetic) near several of the sensory nerves of the knee joint. This provides pain relief during and after a medical procedure, and to treat chronic pain of the knee.    Tell a health care provider about:  Any allergies you have.  All medicines you are taking, including vitamins, herbs, eye drops, creams, and over-the-counter medicines.  Any problems you or family members have had with anesthetic medicines.  Any blood disorders you have.  Any surgeries you have.  Any medical conditions you have.  Whether you are pregnant or may be pregnant.    What are the risks?  Generally, this is a safe procedure. However, problems may occur, including:  Infection.  Bleeding.  Allergic reactions to medicines.  Damage to surrounding structures or organs, such as temporary or permanent nerve damage.    What can I expect after the procedure?        1. You may feel numbness at the injection site for several hours.        2. Once the numbing medication has worn off, you may feel sore and stiff for a couple of days.    Injection site care  You will have 3 bandaids, remove them 24 hours after your procedure, or as told by your health care provider.  Check your injection site every day for signs of infection. Check for:  Redness, swelling, or pain.  Warmth.  Fluid or blood.  Pus or a bad smell.  3.  Do not apply heat to the injection site for 24 hours. You may use ice intermittently if needed by placing a               towel between your skin and the ice bag and using the ice for 20 minutes 2-3 times a day.      4.  Keep the knee clean and dry for 24 hours.    General instructions  Take over-the-counter and prescription medicines only as told by your health care provider.  Do not take showers or baths, swim, or use a hot tub for 24 hours.  Keep all follow-up visits as told by your health care provider. This is important.    Contact a  health care provider if:  You have a fever.  You continue to have numbness, weakness, or tingling after 36 hours.  You have redness, swelling, or pain around your injection site.      This information is not intended to replace advice given to you by your health care provider. Make sure you discuss any questions you have with your health care provider.

## 2022-12-20 NOTE — H&P
H and P reviewed from previous visit and no changes to patient's clinical presentation. Will proceed with procedure as planned. Patient denies history of DM and being on blood thinners.    Kalpesh Small DO  Pain Management   [facility]

## 2022-12-21 ENCOUNTER — TELEPHONE (OUTPATIENT)
Dept: PAIN MEDICINE | Facility: HOSPITAL | Age: 62
End: 2022-12-21
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
## 2023-01-12 ENCOUNTER — OFFICE VISIT (OUTPATIENT)
Dept: PAIN MEDICINE | Facility: CLINIC | Age: 63
End: 2023-01-12
Payer: COMMERCIAL

## 2023-01-12 VITALS
HEART RATE: 59 BPM | SYSTOLIC BLOOD PRESSURE: 154 MMHG | DIASTOLIC BLOOD PRESSURE: 57 MMHG | RESPIRATION RATE: 16 BRPM | OXYGEN SATURATION: 98 %

## 2023-01-12 DIAGNOSIS — M47.816 LUMBAR SPONDYLOSIS: ICD-10-CM

## 2023-01-12 DIAGNOSIS — M51.36 DDD (DEGENERATIVE DISC DISEASE), LUMBAR: ICD-10-CM

## 2023-01-12 DIAGNOSIS — M47.816 FACET ARTHRITIS OF LUMBAR REGION: Primary | ICD-10-CM

## 2023-01-12 PROCEDURE — 99214 OFFICE O/P EST MOD 30 MIN: CPT | Performed by: STUDENT IN AN ORGANIZED HEALTH CARE EDUCATION/TRAINING PROGRAM

## 2023-01-26 ENCOUNTER — HOSPITAL ENCOUNTER (OUTPATIENT)
Dept: PAIN MEDICINE | Facility: HOSPITAL | Age: 63
Discharge: HOME | End: 2023-01-26
Payer: COMMERCIAL

## 2023-01-26 VITALS
HEART RATE: 54 BPM | OXYGEN SATURATION: 99 % | BODY MASS INDEX: 29.51 KG/M2 | RESPIRATION RATE: 16 BRPM | WEIGHT: 188 LBS | DIASTOLIC BLOOD PRESSURE: 61 MMHG | TEMPERATURE: 97.3 F | SYSTOLIC BLOOD PRESSURE: 128 MMHG | HEIGHT: 67 IN

## 2023-01-26 DIAGNOSIS — M47.816 LUMBAR SPONDYLOSIS: Primary | ICD-10-CM

## 2023-01-26 DIAGNOSIS — R52 PAIN: ICD-10-CM

## 2023-01-26 PROCEDURE — 64494 INJ PARAVERT F JNT L/S 2 LEV: CPT | Performed by: STUDENT IN AN ORGANIZED HEALTH CARE EDUCATION/TRAINING PROGRAM

## 2023-01-26 PROCEDURE — 64495 INJ PARAVERT F JNT L/S 3 LEV: CPT | Performed by: STUDENT IN AN ORGANIZED HEALTH CARE EDUCATION/TRAINING PROGRAM

## 2023-01-26 PROCEDURE — 64493 INJ PARAVERT F JNT L/S 1 LEV: CPT | Performed by: STUDENT IN AN ORGANIZED HEALTH CARE EDUCATION/TRAINING PROGRAM

## 2023-01-26 PROCEDURE — 77003 FLUOROGUIDE FOR SPINE INJECT: CPT

## 2023-01-26 PROCEDURE — 25010000002 METHYLPREDNISOLONE PER 80 MG: Performed by: STUDENT IN AN ORGANIZED HEALTH CARE EDUCATION/TRAINING PROGRAM

## 2023-01-26 RX ORDER — METHYLPREDNISOLONE ACETATE 80 MG/ML
80 INJECTION, SUSPENSION INTRA-ARTICULAR; INTRALESIONAL; INTRAMUSCULAR; SOFT TISSUE ONCE
Status: COMPLETED | OUTPATIENT
Start: 2023-01-26 | End: 2023-01-26

## 2023-01-26 RX ORDER — LIDOCAINE HYDROCHLORIDE 10 MG/ML
5 INJECTION, SOLUTION EPIDURAL; INFILTRATION; INTRACAUDAL; PERINEURAL ONCE
Status: COMPLETED | OUTPATIENT
Start: 2023-01-26 | End: 2023-01-26

## 2023-01-26 RX ORDER — BUPIVACAINE HYDROCHLORIDE 2.5 MG/ML
10 INJECTION, SOLUTION EPIDURAL; INFILTRATION; INTRACAUDAL ONCE
Status: COMPLETED | OUTPATIENT
Start: 2023-01-26 | End: 2023-01-26

## 2023-01-26 RX ADMIN — METHYLPREDNISOLONE ACETATE 80 MG: 80 INJECTION, SUSPENSION INTRA-ARTICULAR; INTRALESIONAL; INTRAMUSCULAR; SOFT TISSUE at 10:59

## 2023-01-26 RX ADMIN — BUPIVACAINE HYDROCHLORIDE 10 ML: 2.5 INJECTION, SOLUTION EPIDURAL; INFILTRATION; INTRACAUDAL; PERINEURAL at 10:59

## 2023-01-26 RX ADMIN — LIDOCAINE HYDROCHLORIDE 5 ML: 10 INJECTION, SOLUTION EPIDURAL; INFILTRATION; INTRACAUDAL; PERINEURAL at 10:54

## 2023-01-26 NOTE — PROCEDURES
PROCEDURE:  Bilateral L 4, 5 and SA MBB and S 1 LBB    INDICATION: Patient complains of pain in the lower back, bilateral.  Pain increases on extension of the spine, facet tenderness present.       PROCEDURE DETAILS:   After obtaining written informed consent patient was taken to the procedure room. Pre-procedure blood pressure and pulse were stable and recorded in patients clinic chart. The patient was placed in a prone position and the lower back was prepped with chloraprep and draped in the usual sterile fashion.  The skin was infiltrated with 1% lidocaine at the junction of transverse process with the vertebral body at the left L 4 level. A 22-gauge, 3.5-inch needle was inserted into the lumbar medial branch under radiographic guidance. Both AP and lateral views were obtained.   Following placement of the needle and negative aspiration, 1.2 cc mixture of bupivacaine 0.25% with depo-medrol was injected  The identical procedure was performed on left L5 and SA medial branch and S 1 lateral branch at the rim of the S 1 foramen. Identical procedure was repeated on right side at L4, L5, Sa, S1.  A total of 9 cc of 0.25% bupivacaine with 80 mg of Depo-medrol was injected. During the procedure there was no evidence of CSF, paresthesias or heme.    After the procedure the needles were removed. Skin was cleaned and a sterile dressing was applied.    Following the procedure the patient's vital signs were stable. The patient was discharged home in good condition after being given discharge instructions.    COMPLICATIONS None    Kalpesh Small DO  Pain Management   [facility]      29-Aug-2017

## 2023-01-26 NOTE — H&P
H and P reviewed from previous visit and no changes to patient's clinical presentation. Will proceed with procedure as planned. Patient denies history of DM and being on blood thinners.    Kalpesh Small DO  Pain Management   [facility]

## 2023-01-27 ENCOUNTER — TELEPHONE (OUTPATIENT)
Dept: PAIN MEDICINE | Facility: HOSPITAL | Age: 63
End: 2023-01-27
Payer: COMMERCIAL
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
## 2023-02-16 ENCOUNTER — OFFICE VISIT (OUTPATIENT)
Dept: PAIN MEDICINE | Facility: CLINIC | Age: 63
End: 2023-02-16
Payer: COMMERCIAL

## 2023-02-16 VITALS
HEART RATE: 64 BPM | RESPIRATION RATE: 16 BRPM | OXYGEN SATURATION: 99 % | SYSTOLIC BLOOD PRESSURE: 137 MMHG | DIASTOLIC BLOOD PRESSURE: 53 MMHG

## 2023-02-16 DIAGNOSIS — M47.816 FACET ARTHRITIS OF LUMBAR REGION: ICD-10-CM

## 2023-02-16 DIAGNOSIS — M62.838 MUSCLE SPASMS OF LOWER EXTREMITY, UNSPECIFIED LATERALITY: ICD-10-CM

## 2023-02-16 DIAGNOSIS — M51.36 DDD (DEGENERATIVE DISC DISEASE), LUMBAR: ICD-10-CM

## 2023-02-16 DIAGNOSIS — M79.18 MYOFASCIAL PAIN SYNDROME: Primary | ICD-10-CM

## 2023-02-16 PROCEDURE — 99214 OFFICE O/P EST MOD 30 MIN: CPT | Performed by: STUDENT IN AN ORGANIZED HEALTH CARE EDUCATION/TRAINING PROGRAM

## 2023-02-16 RX ORDER — NABUMETONE 750 MG/1
TABLET, FILM COATED ORAL
Qty: 60 TABLET | Refills: 3 | Status: SHIPPED | OUTPATIENT
Start: 2023-02-16

## 2023-02-16 RX ORDER — TIZANIDINE 4 MG/1
4 TABLET ORAL 2 TIMES DAILY PRN
Qty: 30 TABLET | Refills: 1 | Status: SHIPPED | OUTPATIENT
Start: 2023-02-16 | End: 2023-04-17

## 2023-03-01 ENCOUNTER — TELEPHONE (OUTPATIENT)
Dept: ORTHOPEDIC SURGERY | Facility: CLINIC | Age: 63
End: 2023-03-01
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
## 2023-03-21 ENCOUNTER — OFFICE VISIT (OUTPATIENT)
Dept: FAMILY MEDICINE CLINIC | Facility: CLINIC | Age: 63
End: 2023-03-21
Payer: COMMERCIAL

## 2023-03-21 VITALS
BODY MASS INDEX: 28.88 KG/M2 | HEIGHT: 67 IN | SYSTOLIC BLOOD PRESSURE: 142 MMHG | WEIGHT: 184 LBS | HEART RATE: 56 BPM | DIASTOLIC BLOOD PRESSURE: 72 MMHG | OXYGEN SATURATION: 98 %

## 2023-03-21 DIAGNOSIS — M86.9 OSTEITIS PUBIS: ICD-10-CM

## 2023-03-21 DIAGNOSIS — M70.62 TROCHANTERIC BURSITIS OF LEFT HIP: Primary | ICD-10-CM

## 2023-03-21 RX ORDER — METHYLPREDNISOLONE ACETATE 80 MG/ML
80 INJECTION, SUSPENSION INTRA-ARTICULAR; INTRALESIONAL; INTRAMUSCULAR; SOFT TISSUE ONCE
Status: COMPLETED | OUTPATIENT
Start: 2023-03-21 | End: 2023-03-21

## 2023-03-21 RX ADMIN — METHYLPREDNISOLONE ACETATE 80 MG: 80 INJECTION, SUSPENSION INTRA-ARTICULAR; INTRALESIONAL; INTRAMUSCULAR; SOFT TISSUE at 15:03

## 2023-03-21 NOTE — PROGRESS NOTES
Rooming Tab(CC,VS,Pt Hx,Fall Screen)  Chief Complaint   Patient presents with   • Knee Pain   • Hip Pain       Subjective     I have reviewed and updated her medications, medical history and problem list during today's office visit.     The patient presents today for evaluation of left hip pain.    Left hip pain  The patient complains of a clicking sound in her left hip. Her left lower extremity is sore and it is worse when she stands up. She feels a sensation she describes as like an electric shock radiating down to her left knee when it pops and clicks. She denies pain radiating to her toes. She had a medial block in 2023 for back pain. She was having left hip pain almost daily in 2023 and she was informed it could be bursitis. The medial injection did not improve her left hip pain. She is not sleeping well due to this pain. She finds that the medication she was prescribed for arthritis improves her pain. She has discontinued gabapentin as she does not tolerate it well. She is taking Zanaflex instead of baclofen for her muscle relaxer. The patient's symptoms are less bothersome on days she works for 8 hours than when she is home on the weekend. She tries to find ways to stay active. She cannot go barefoot at home. She is still trying to find a pair of shoes that do not make her feet hurt.    Patient Care Team:  Kerry Mayes MD as PCP - General    Problem List Tab  Medications Tab  Synopsis Tab  Chart Review Tab  Care Everywhere Tab  Immunizations Tab  Patient History Tab    Social History     Tobacco Use   • Smoking status: Former     Packs/day: 1.00     Years: 15.00     Pack years: 15.00     Types: Cigarettes     Start date: 8/15/1974     Quit date: 5/15/1994     Years since quittin.8   • Smokeless tobacco: Never   • Tobacco comments:     Off and on use amount varies   Substance Use Topics   • Alcohol use: No       Review of Systems    Objective     Rooming Tab(CC,VS,Pt Hx,Fall Screen)  BP  "142/72   Pulse 56   Ht 170.2 cm (67\")   Wt 83.5 kg (184 lb)   SpO2 98%   BMI 28.82 kg/m²     Body mass index is 28.82 kg/m².    Physical Exam  Vitals and nursing note reviewed.   Constitutional:       Appearance: Normal appearance. She is well-developed.   HENT:      Head: Normocephalic and atraumatic.      Nose: No rhinorrhea.   Eyes:      Pupils: Pupils are equal, round, and reactive to light.   Cardiovascular:      Rate and Rhythm: Normal rate and regular rhythm.      Pulses: Normal pulses.      Heart sounds: Normal heart sounds. No murmur heard.  Pulmonary:      Effort: Pulmonary effort is normal.      Breath sounds: Normal breath sounds.   Musculoskeletal:         General: Tenderness present.      Cervical back: Normal range of motion and neck supple.      Comments: trocanteric point sensitive   Skin:     Capillary Refill: Capillary refill takes less than 2 seconds.   Neurological:      Mental Status: She is alert and oriented to person, place, and time.   Psychiatric:         Mood and Affect: Mood normal.         Behavior: Behavior normal.          Statin Choice Calculator  Data Reviewed:        Procedure Note:   Procedure performed:Left greater trochanter injection    The risks (including but not limited pain, bleeding and infection) and benefits of the procedure  were discussed with patient. Questions were sought and answered and informed consent was given for the procedure.     The procedure site was prepped and draped in standard bedside fashion for the procedure as indicated. The skin and deeper tissue was anesthetized with 1 cc's of Bupivacaine 0.25% without epinephrine. A 22 guage needle was utilized for the procedure and it was performed without complications.  The joint was injected with 1 cc's of depomedrol    The patient tolerated the procedure well and my repeat post-procedure exam was unremarkable for any problems related to the procedure    Instructions were given to contact us for any " problems related to the procedure.      The data below has been reviewed by Kerry Mayes MD on 03/21/2023.          Assessment & Plan   Order Review Tab  Health Maintenance Tab  Patient Plan/Order Tab  Diagnoses and all orders for this visit:    1. Trochanteric bursitis of left hip (Primary)  Comments:  toelrated injection- no barefoot- always with good shoes  Orders:  -     XR Hip With or Without Pelvis 2 - 3 View Left; Future  -     methylPREDNISolone acetate (DEPO-medrol) injection 80 mg        Wrapup Tab  Return if symptoms worsen or fail to improve.       They were informed of the diagnosis and treatment plan and directed to f/u for any further problems or concerns.      During this visit for their annual exam, we reviewed their personal history, social history and family history.  We went over their medications and all the recommended health maintenence items for their age group. They were given the opportunity to ask questions and discuss other concerns.          Answers for HPI/ROS submitted by the patient on 3/21/2023  Please describe your symptoms.: Pain in hip radiating down to knee  Have you had these symptoms before?: No  How long have you been having these symptoms?: Greater than 2 weeks  Please list any medications you are currently taking for this condition.: NSAID  Please describe any probable cause for these symptoms. : No idea  What is the primary reason for your visit?: Other    Transcribed from ambient dictation for Kerry Mayes MD by Manan Acuna.  03/21/23   16:03 EDT    Patient or patient representative verbalized consent to the visit recording.  I have personally performed the services described in this document as transcribed by the above individual, and it is both accurate and complete.  Kerry Mayes MD  3/28/2023  10:45 EDT

## 2023-03-30 ENCOUNTER — HOSPITAL ENCOUNTER (OUTPATIENT)
Dept: GENERAL RADIOLOGY | Facility: HOSPITAL | Age: 63
Discharge: HOME | End: 2023-03-30
Admitting: INTERNAL MEDICINE
Payer: COMMERCIAL

## 2023-03-30 DIAGNOSIS — M70.62 TROCHANTERIC BURSITIS OF LEFT HIP: ICD-10-CM

## 2023-03-30 PROCEDURE — 73502 X-RAY EXAM HIP UNI 2-3 VIEWS: CPT
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
## 2023-04-03 ENCOUNTER — OFFICE VISIT (OUTPATIENT)
Dept: PAIN MEDICINE | Facility: CLINIC | Age: 63
End: 2023-04-03
Payer: COMMERCIAL

## 2023-04-03 VITALS
RESPIRATION RATE: 16 BRPM | HEART RATE: 65 BPM | DIASTOLIC BLOOD PRESSURE: 46 MMHG | SYSTOLIC BLOOD PRESSURE: 138 MMHG | OXYGEN SATURATION: 98 %

## 2023-04-03 DIAGNOSIS — M70.62 TROCHANTERIC BURSITIS OF LEFT HIP: ICD-10-CM

## 2023-04-03 DIAGNOSIS — M47.816 FACET ARTHRITIS OF LUMBAR REGION: ICD-10-CM

## 2023-04-03 DIAGNOSIS — M86.9 OSTEITIS PUBIS: ICD-10-CM

## 2023-04-03 DIAGNOSIS — M25.561 RECURRENT PAIN OF RIGHT KNEE: Primary | ICD-10-CM

## 2023-04-03 DIAGNOSIS — M53.3 SACROILIAC JOINT DYSFUNCTION: ICD-10-CM

## 2023-04-03 PROCEDURE — 99214 OFFICE O/P EST MOD 30 MIN: CPT | Performed by: STUDENT IN AN ORGANIZED HEALTH CARE EDUCATION/TRAINING PROGRAM

## 2023-04-06 ENCOUNTER — HOSPITAL ENCOUNTER (OUTPATIENT)
Dept: PAIN MEDICINE | Facility: HOSPITAL | Age: 63
Discharge: HOME | End: 2023-04-06
Payer: COMMERCIAL

## 2023-04-06 VITALS
DIASTOLIC BLOOD PRESSURE: 60 MMHG | OXYGEN SATURATION: 98 % | SYSTOLIC BLOOD PRESSURE: 135 MMHG | TEMPERATURE: 97.8 F | HEART RATE: 49 BPM | WEIGHT: 176 LBS | BODY MASS INDEX: 27.62 KG/M2 | RESPIRATION RATE: 16 BRPM | HEIGHT: 67 IN

## 2023-04-06 DIAGNOSIS — M25.561 RECURRENT PAIN OF RIGHT KNEE: Primary | ICD-10-CM

## 2023-04-06 DIAGNOSIS — R52 PAIN: ICD-10-CM

## 2023-04-06 PROCEDURE — 25010000002 METHYLPREDNISOLONE PER 40 MG: Performed by: STUDENT IN AN ORGANIZED HEALTH CARE EDUCATION/TRAINING PROGRAM

## 2023-04-06 PROCEDURE — 25010000002 ROPIVACAINE PER 1 MG: Performed by: STUDENT IN AN ORGANIZED HEALTH CARE EDUCATION/TRAINING PROGRAM

## 2023-04-06 PROCEDURE — 77003 FLUOROGUIDE FOR SPINE INJECT: CPT

## 2023-04-06 PROCEDURE — 64454 NJX AA&/STRD GNCLR NRV BRNCH: CPT | Performed by: STUDENT IN AN ORGANIZED HEALTH CARE EDUCATION/TRAINING PROGRAM

## 2023-04-06 RX ORDER — LIDOCAINE HYDROCHLORIDE 10 MG/ML
5 INJECTION, SOLUTION EPIDURAL; INFILTRATION; INTRACAUDAL; PERINEURAL ONCE
Status: COMPLETED | OUTPATIENT
Start: 2023-04-06 | End: 2023-04-06

## 2023-04-06 RX ORDER — ROPIVACAINE HYDROCHLORIDE 2 MG/ML
10 INJECTION, SOLUTION EPIDURAL; INFILTRATION; PERINEURAL ONCE
Status: COMPLETED | OUTPATIENT
Start: 2023-04-06 | End: 2023-04-06

## 2023-04-06 RX ORDER — METHYLPREDNISOLONE ACETATE 40 MG/ML
40 INJECTION, SUSPENSION INTRA-ARTICULAR; INTRALESIONAL; INTRAMUSCULAR; SOFT TISSUE ONCE
Status: COMPLETED | OUTPATIENT
Start: 2023-04-06 | End: 2023-04-06

## 2023-04-06 RX ADMIN — METHYLPREDNISOLONE ACETATE 40 MG: 40 INJECTION, SUSPENSION INTRA-ARTICULAR; INTRALESIONAL; INTRAMUSCULAR; INTRASYNOVIAL; SOFT TISSUE at 11:01

## 2023-04-06 RX ADMIN — ROPIVACAINE HYDROCHLORIDE 9 ML: 2 INJECTION EPIDURAL; INFILTRATION; PERINEURAL at 11:01

## 2023-04-06 RX ADMIN — LIDOCAINE HYDROCHLORIDE 5 ML: 10 INJECTION, SOLUTION EPIDURAL; INFILTRATION; INTRACAUDAL; PERINEURAL at 10:58

## 2023-04-06 NOTE — H&P
H and P reviewed from previous visit and no changes to patient's clinical presentation. Will proceed with procedure as planned. Patient denies history of DM and being on blood thinners.    Kalpesh Small DO  Pain Management   [facility]

## 2023-04-06 NOTE — PROCEDURES
Preoperative Diagnosis: Osteoarthritis Knee- Right    Postoperative Diagnosis: Same    Procedure:  Genicular Nerve Block- SL, SM, IM    Procedure Details: The patient's chart was reviewed.  After obtaining written informed consent, the patient was placed in a supine position with the right knee exposed.  The skin overlying the right knee was cleaned with chloraprep times two. A 25 g needle was used to anesthetize the skin with 1% lidocaine on the superior medial, superior lateral part of the femur. A 22 g spinal needle was introduced under fluoro and guided into supero lateral and supero medial part of femur. The needle was advanced in the lateral view to the two third of femur. Another needle was placed in the infero medial part of the knee along the tibia under fluoro. After the needle confirmation in lateral view 3 cc of 0.2% ropivicaine was injected in each needle for a total of 9 cc of 0.2% ropivicaine with 40 mg of depo medrol. The needles were removed and bandaid applied.     Following the procedure the patient's vital signs were stable. The patient was discharged home in good condition after being given discharge instructions.    COMPLICATIONS None    Kalpesh Small DO  Pain Management   [facility]
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
## 2023-05-11 ENCOUNTER — OFFICE VISIT (OUTPATIENT)
Dept: PAIN MEDICINE | Facility: CLINIC | Age: 63
End: 2023-05-11
Payer: COMMERCIAL

## 2023-05-11 VITALS
HEART RATE: 52 BPM | DIASTOLIC BLOOD PRESSURE: 41 MMHG | SYSTOLIC BLOOD PRESSURE: 128 MMHG | RESPIRATION RATE: 16 BRPM | OXYGEN SATURATION: 99 %

## 2023-05-11 DIAGNOSIS — M70.62 TROCHANTERIC BURSITIS OF LEFT HIP: ICD-10-CM

## 2023-05-11 DIAGNOSIS — M25.561 RECURRENT PAIN OF RIGHT KNEE: ICD-10-CM

## 2023-05-11 DIAGNOSIS — M47.816 FACET ARTHRITIS OF LUMBAR REGION: Primary | ICD-10-CM

## 2023-05-11 RX ORDER — BACLOFEN 10 MG/1
10 TABLET ORAL NIGHTLY PRN
Qty: 30 TABLET | Refills: 2 | Status: SHIPPED | OUTPATIENT
Start: 2023-05-11 | End: 2023-08-09

## 2023-05-25 ENCOUNTER — HOSPITAL ENCOUNTER (OUTPATIENT)
Dept: PAIN MEDICINE | Facility: HOSPITAL | Age: 63
Discharge: HOME | End: 2023-05-25
Payer: COMMERCIAL

## 2023-05-25 VITALS
TEMPERATURE: 97.4 F | DIASTOLIC BLOOD PRESSURE: 70 MMHG | RESPIRATION RATE: 16 BRPM | HEIGHT: 67 IN | OXYGEN SATURATION: 98 % | WEIGHT: 172 LBS | BODY MASS INDEX: 27 KG/M2 | HEART RATE: 54 BPM | SYSTOLIC BLOOD PRESSURE: 113 MMHG

## 2023-05-25 DIAGNOSIS — M47.816 LUMBAR SPONDYLOSIS: Primary | ICD-10-CM

## 2023-05-25 DIAGNOSIS — R52 PAIN: ICD-10-CM

## 2023-05-25 PROCEDURE — 25010000002 METHYLPREDNISOLONE PER 80 MG: Performed by: STUDENT IN AN ORGANIZED HEALTH CARE EDUCATION/TRAINING PROGRAM

## 2023-05-25 PROCEDURE — 77003 FLUOROGUIDE FOR SPINE INJECT: CPT

## 2023-05-25 RX ORDER — METHYLPREDNISOLONE ACETATE 80 MG/ML
80 INJECTION, SUSPENSION INTRA-ARTICULAR; INTRALESIONAL; INTRAMUSCULAR; SOFT TISSUE ONCE
Status: COMPLETED | OUTPATIENT
Start: 2023-05-25 | End: 2023-05-25

## 2023-05-25 RX ORDER — LIDOCAINE HYDROCHLORIDE 10 MG/ML
5 INJECTION, SOLUTION EPIDURAL; INFILTRATION; INTRACAUDAL; PERINEURAL ONCE
Status: COMPLETED | OUTPATIENT
Start: 2023-05-25 | End: 2023-05-25

## 2023-05-25 RX ORDER — BUPIVACAINE HYDROCHLORIDE 2.5 MG/ML
10 INJECTION, SOLUTION EPIDURAL; INFILTRATION; INTRACAUDAL ONCE
Status: COMPLETED | OUTPATIENT
Start: 2023-05-25 | End: 2023-05-25

## 2023-05-25 RX ADMIN — BUPIVACAINE HYDROCHLORIDE 10 ML: 2.5 INJECTION, SOLUTION EPIDURAL; INFILTRATION; INTRACAUDAL; PERINEURAL at 08:47

## 2023-05-25 RX ADMIN — METHYLPREDNISOLONE ACETATE 80 MG: 80 INJECTION, SUSPENSION INTRA-ARTICULAR; INTRALESIONAL; INTRAMUSCULAR; SOFT TISSUE at 08:47

## 2023-05-25 RX ADMIN — LIDOCAINE HYDROCHLORIDE 5 ML: 10 INJECTION, SOLUTION EPIDURAL; INFILTRATION; INTRACAUDAL; PERINEURAL at 08:42

## 2023-05-25 NOTE — H&P
H and P reviewed from previous visit and no changes to patient's clinical presentation. Will proceed with procedure as planned. Patient denies history of DM and being on blood thinners.    Kalpesh Small DO  Pain Management   [facility]

## 2023-05-25 NOTE — PROCEDURES
PROCEDURE:  Bilateral L 4, 5 and SA MBB and S 1 LBB    INDICATION: Patient complains of pain in the lower back, bilateral.  Pain increases on extension of the spine, facet tenderness present.       PROCEDURE DETAILS:   After obtaining written informed consent patient was taken to the procedure room. Pre-procedure blood pressure and pulse were stable and recorded in patients clinic chart. The patient was placed in a prone position and the lower back was prepped with chloraprep and draped in the usual sterile fashion.  The skin was infiltrated with 1% lidocaine at the junction of transverse process with the vertebral body at the left L 4 level. A 22-gauge, 3.5-inch needle was inserted into the lumbar medial branch under radiographic guidance. Both AP and lateral views were obtained.   Following placement of the needle and negative aspiration, 1.2 cc mixture of bupivacaine 0.25% with depo-medrol was injected  The identical procedure was performed on left L5 and SA medial branch and S 1 lateral branch at the rim of the S 1 foramen. Identical procedure was repeated on right side at L4, L5, Sa, S1.  A total of 9 cc of 0.25% bupivacaine with 80 mg of Depo-medrol was injected. During the procedure there was no evidence of CSF, paresthesias or heme.    After the procedure the needles were removed. Skin was cleaned and a sterile dressing was applied.    Following the procedure the patient's vital signs were stable. The patient was discharged home in good condition after being given discharge instructions.    COMPLICATIONS None    Kalpesh Small DO  Pain Management   [facility]

## 2023-05-26 ENCOUNTER — TELEPHONE (OUTPATIENT)
Dept: PAIN MEDICINE | Facility: HOSPITAL | Age: 63
End: 2023-05-26
Payer: COMMERCIAL

## 2023-06-08 ENCOUNTER — LAB (OUTPATIENT)
Dept: FAMILY MEDICINE CLINIC | Facility: CLINIC | Age: 63
End: 2023-06-08
Payer: COMMERCIAL

## 2023-06-08 DIAGNOSIS — M70.62 TROCHANTERIC BURSITIS OF LEFT HIP: ICD-10-CM

## 2023-06-08 DIAGNOSIS — I10 HYPERTENSION, UNSPECIFIED TYPE: Primary | ICD-10-CM

## 2023-06-08 DIAGNOSIS — E55.9 VITAMIN D DEFICIENCY: ICD-10-CM

## 2023-06-08 DIAGNOSIS — R73.09 ELEVATED GLUCOSE: ICD-10-CM

## 2023-06-08 DIAGNOSIS — E53.8 B12 DEFICIENCY: ICD-10-CM

## 2023-06-08 DIAGNOSIS — Z00.00 PREVENTATIVE HEALTH CARE: ICD-10-CM

## 2023-06-08 DIAGNOSIS — E03.9 ACQUIRED HYPOTHYROIDISM: ICD-10-CM

## 2023-06-08 LAB
25(OH)D3 SERPL-MCNC: 56 NG/ML (ref 30–100)
ALBUMIN SERPL-MCNC: 4.6 G/DL (ref 3.5–5.2)
ALBUMIN/GLOB SERPL: 1.8 G/DL
ALP SERPL-CCNC: 59 U/L (ref 39–117)
ALT SERPL W P-5'-P-CCNC: 11 U/L (ref 1–33)
ANION GAP SERPL CALCULATED.3IONS-SCNC: 13.8 MMOL/L (ref 5–15)
AST SERPL-CCNC: 15 U/L (ref 1–32)
BASOPHILS # BLD AUTO: 0.07 10*3/MM3 (ref 0–0.2)
BASOPHILS NFR BLD AUTO: 1.2 % (ref 0–1.5)
BILIRUB SERPL-MCNC: 0.6 MG/DL (ref 0–1.2)
BUN SERPL-MCNC: 20 MG/DL (ref 8–23)
BUN/CREAT SERPL: 27.4 (ref 7–25)
CALCIUM SPEC-SCNC: 9.9 MG/DL (ref 8.6–10.5)
CHLORIDE SERPL-SCNC: 104 MMOL/L (ref 98–107)
CHOLEST SERPL-MCNC: 234 MG/DL (ref 0–200)
CO2 SERPL-SCNC: 25.2 MMOL/L (ref 22–29)
CREAT SERPL-MCNC: 0.73 MG/DL (ref 0.57–1)
DEPRECATED RDW RBC AUTO: 43.6 FL (ref 37–54)
EGFRCR SERPLBLD CKD-EPI 2021: 92.5 ML/MIN/1.73
EOSINOPHIL # BLD AUTO: 0.23 10*3/MM3 (ref 0–0.4)
EOSINOPHIL NFR BLD AUTO: 3.9 % (ref 0.3–6.2)
ERYTHROCYTE [DISTWIDTH] IN BLOOD BY AUTOMATED COUNT: 13.7 % (ref 12.3–15.4)
GLOBULIN UR ELPH-MCNC: 2.5 GM/DL
GLUCOSE SERPL-MCNC: 71 MG/DL (ref 65–99)
HBA1C MFR BLD: 4.7 % (ref 4.8–5.6)
HCT VFR BLD AUTO: 41 % (ref 34–46.6)
HDLC SERPL-MCNC: 70 MG/DL (ref 40–60)
HGB BLD-MCNC: 13.7 G/DL (ref 12–15.9)
IMM GRANULOCYTES # BLD AUTO: 0.01 10*3/MM3 (ref 0–0.05)
IMM GRANULOCYTES NFR BLD AUTO: 0.2 % (ref 0–0.5)
LDLC SERPL CALC-MCNC: 153 MG/DL (ref 0–100)
LDLC/HDLC SERPL: 2.15 {RATIO}
LYMPHOCYTES # BLD AUTO: 1.81 10*3/MM3 (ref 0.7–3.1)
LYMPHOCYTES NFR BLD AUTO: 30.4 % (ref 19.6–45.3)
MCH RBC QN AUTO: 29.6 PG (ref 26.6–33)
MCHC RBC AUTO-ENTMCNC: 33.4 G/DL (ref 31.5–35.7)
MCV RBC AUTO: 88.6 FL (ref 79–97)
MONOCYTES # BLD AUTO: 0.63 10*3/MM3 (ref 0.1–0.9)
MONOCYTES NFR BLD AUTO: 10.6 % (ref 5–12)
NEUTROPHILS NFR BLD AUTO: 3.2 10*3/MM3 (ref 1.7–7)
NEUTROPHILS NFR BLD AUTO: 53.7 % (ref 42.7–76)
NRBC BLD AUTO-RTO: 0 /100 WBC (ref 0–0.2)
PLATELET # BLD AUTO: 259 10*3/MM3 (ref 140–450)
PMV BLD AUTO: 11.8 FL (ref 6–12)
POTASSIUM SERPL-SCNC: 4.4 MMOL/L (ref 3.5–5.2)
PROT SERPL-MCNC: 7.1 G/DL (ref 6–8.5)
RBC # BLD AUTO: 4.63 10*6/MM3 (ref 3.77–5.28)
SODIUM SERPL-SCNC: 143 MMOL/L (ref 136–145)
TRIGL SERPL-MCNC: 66 MG/DL (ref 0–150)
TSH SERPL DL<=0.05 MIU/L-ACNC: 0.98 UIU/ML (ref 0.27–4.2)
VIT B12 BLD-MCNC: 379 PG/ML (ref 211–946)
VLDLC SERPL-MCNC: 11 MG/DL (ref 5–40)
WBC NRBC COR # BLD: 5.95 10*3/MM3 (ref 3.4–10.8)

## 2023-06-08 PROCEDURE — 82306 VITAMIN D 25 HYDROXY: CPT | Performed by: INTERNAL MEDICINE

## 2023-06-08 PROCEDURE — 84443 ASSAY THYROID STIM HORMONE: CPT | Performed by: INTERNAL MEDICINE

## 2023-06-08 PROCEDURE — 80053 COMPREHEN METABOLIC PANEL: CPT | Performed by: INTERNAL MEDICINE

## 2023-06-08 PROCEDURE — 80061 LIPID PANEL: CPT | Performed by: INTERNAL MEDICINE

## 2023-06-08 PROCEDURE — 83036 HEMOGLOBIN GLYCOSYLATED A1C: CPT | Performed by: INTERNAL MEDICINE

## 2023-06-08 PROCEDURE — 85025 COMPLETE CBC W/AUTO DIFF WBC: CPT | Performed by: INTERNAL MEDICINE

## 2023-06-08 PROCEDURE — 36415 COLL VENOUS BLD VENIPUNCTURE: CPT

## 2023-06-08 PROCEDURE — 82607 VITAMIN B-12: CPT | Performed by: INTERNAL MEDICINE
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
## 2023-06-15 ENCOUNTER — OFFICE VISIT (OUTPATIENT)
Dept: FAMILY MEDICINE CLINIC | Facility: CLINIC | Age: 63
End: 2023-06-15
Payer: COMMERCIAL

## 2023-06-15 ENCOUNTER — OFFICE VISIT (OUTPATIENT)
Dept: PAIN MEDICINE | Facility: CLINIC | Age: 63
End: 2023-06-15
Payer: COMMERCIAL

## 2023-06-15 VITALS
DIASTOLIC BLOOD PRESSURE: 70 MMHG | SYSTOLIC BLOOD PRESSURE: 160 MMHG | HEIGHT: 67 IN | HEART RATE: 75 BPM | BODY MASS INDEX: 26.74 KG/M2 | RESPIRATION RATE: 16 BRPM | WEIGHT: 170.4 LBS | OXYGEN SATURATION: 99 %

## 2023-06-15 VITALS
HEART RATE: 49 BPM | DIASTOLIC BLOOD PRESSURE: 57 MMHG | OXYGEN SATURATION: 100 % | RESPIRATION RATE: 16 BRPM | SYSTOLIC BLOOD PRESSURE: 133 MMHG

## 2023-06-15 DIAGNOSIS — E03.9 ACQUIRED HYPOTHYROIDISM: ICD-10-CM

## 2023-06-15 DIAGNOSIS — M47.816 FACET ARTHRITIS OF LUMBAR REGION: ICD-10-CM

## 2023-06-15 DIAGNOSIS — E55.9 VITAMIN D DEFICIENCY: ICD-10-CM

## 2023-06-15 DIAGNOSIS — Z00.00 PREVENTATIVE HEALTH CARE: Primary | ICD-10-CM

## 2023-06-15 DIAGNOSIS — M25.561 RECURRENT PAIN OF RIGHT KNEE: ICD-10-CM

## 2023-06-15 DIAGNOSIS — M70.62 TROCHANTERIC BURSITIS OF LEFT HIP: ICD-10-CM

## 2023-06-15 PROCEDURE — 99396 PREV VISIT EST AGE 40-64: CPT | Performed by: INTERNAL MEDICINE

## 2023-06-15 RX ORDER — NABUMETONE 750 MG/1
750 TABLET, FILM COATED ORAL 2 TIMES DAILY
Qty: 180 TABLET | Refills: 3 | Status: SHIPPED | OUTPATIENT
Start: 2023-06-15

## 2023-06-15 NOTE — PROGRESS NOTES
"Rooming Tab(CC,VS,Pt Hx,Fall Screen)  Chief Complaint   Patient presents with    Annual Exam     Here for physical and labs       Subjective     Diabetes  The patient's A1c was 4.7 percent. Her blood glucose was 71 mg/dL.    Hyperlipidemia  The patient's cholesterol is 230 mg/dL. Her HDL is 70 mg/dL. She does not like red meat. She can eat a little bit of it and then it feels as though it hangs in her throat, and she cannot swallow. She would rather have ground chicken.    Weight loss  The patient is continuing to lose weight. She has lost 45 pounds since this time last year. She states that she is not sure what her goal weight is. She states that she needs to spend more time towards building muscle. She does not think she can do planks because of her knee being so \"screwed up.\"    Carpal tunnel  The patient has carpal tunnel in both of her wrists. They want to do surgery, but she does not want to. She does drop things. She is not having a hard time with the pan when she is squeezing_.  She quit wearing the brace at night. It did not seem to be doing any good.  She has learned that her hand goes numb and it takes a while to hope to get it back. She is trying to trust an orthopedic surgeon at this point. She went to  in [place] and she talked to the one doctor and he informed her she a nerve test She knows she will be stuck home in 3 weeks. With her job, she cannot do it.  She is bored. She has a garden right now. If she has done it before she had to start sofi, it would be ideal.    Arthritis  The patient is still taking nabumetone for her arthritis. It does help. She tries to eat with the pills.    Hypertension  The patient checks her blood pressure at home. She has not had any issues. Her blood pressure at home is in the upper 130s, but it is sometimes lower.    Migraines  The patient has headaches once in a while, but nothing like she used to. Most of it is allergies. She does have migraines intermittently, " but nothing like she used to.    Hip pain  When she got the injections, it helped her lower back, but it did not help her hip. She has had 9 injections in her lower back. When this wears off, she has to see him when she leaves here. He will burn the nerves. He informed her that it lasts 6 months to a year. He did not see anything worth worrying about her hip. He told her that it will quit just all at once. Sometimes when she puts her foot down, it feels as though pain shoots from knee to hip and back. He has been doing this for 6 months. The first time he had injections, it helped. The second time he did it, it did not help. The third time he went to [facility] branch. This is the second medial branch. She is not sure if it is helping right now. It is not clicking like it was. It is not every day all day long. It is not so bad that she wants to take a medicine. She can sleep with it. She is on muscle relaxers that help her sleep for at least 4 hours. She takes it by 8:30 PM and she is usually about 4 hours. She is on 10 mg on the lower dose.    Overactive bladder  The Myrbetriq is helping with her bladder.    Health maintenance  The patient is trying to do her breast exams at home. She tries to use sunscreen. She does not spend a lot. Her allergy issues are no more than usual. She has Zyrtec, but she does not take it every night. She bruises easily. She is not taking any aspirin. Her nails are always like that. The collagen did not help at all. Her eyesight is not too bad yet. She will not get cataracts. She uses teeters. They tried to put her on bifocal because it made her sick and she could not take a step. She could not orient herself. She has dry eyes.      I have reviewed and updated her medications, medical history and problem list during today's office visit.     Patient Care Team:  Kerry Mayes MD as PCP - General    Problem List Tab  Medications Tab  Synopsis Tab  Chart Review Tab  Care Everywhere  "Tab  Immunizations Tab  Patient History Tab    Social History     Tobacco Use    Smoking status: Former     Packs/day: 1.00     Years: 15.00     Pack years: 15.00     Types: Cigarettes     Start date: 8/15/1974     Quit date: 5/15/1994     Years since quittin.1    Smokeless tobacco: Never    Tobacco comments:     Off and on use amount varies   Substance Use Topics    Alcohol use: No           Objective     Rooming Tab(CC,VS,Pt Hx,Fall Screen)  /70 (BP Location: Right arm, Patient Position: Sitting, Cuff Size: Adult)   Pulse 75   Resp 16   Ht 170.2 cm (67\")   Wt 77.3 kg (170 lb 6.4 oz)   SpO2 99%   BMI 26.69 kg/m²     Body mass index is 26.69 kg/m².    Physical Exam  Vitals and nursing note reviewed.   Constitutional:       Appearance: Normal appearance. She is well-developed.   HENT:      Head: Normocephalic and atraumatic.      Right Ear: Tympanic membrane normal.      Left Ear: Tympanic membrane normal.      Nose: No rhinorrhea.      Mouth/Throat:      Pharynx: No posterior oropharyngeal erythema.   Eyes:      Pupils: Pupils are equal, round, and reactive to light.   Cardiovascular:      Rate and Rhythm: Normal rate and regular rhythm.      Pulses: Normal pulses.      Heart sounds: Normal heart sounds. No murmur heard.  Pulmonary:      Effort: Pulmonary effort is normal.      Breath sounds: Normal breath sounds.   Chest:   Breasts:     Right: No inverted nipple or mass.      Left: No inverted nipple or mass.   Abdominal:      General: Bowel sounds are normal. There is no distension.      Palpations: Abdomen is soft.   Musculoskeletal:         General: Tenderness present.      Cervical back: Normal range of motion and neck supple.   Skin:     Capillary Refill: Capillary refill takes less than 2 seconds.   Neurological:      Mental Status: She is alert and oriented to person, place, and time.   Psychiatric:         Mood and Affect: Mood normal.         Behavior: Behavior normal.        Statin Choice " Calculator  Data Reviewed:         The data below has been reviewed by Kerry Mayes MD on 06/15/2023.  Lab Results - Last 18 Months   Lab Units 06/08/23  0850   BUN mg/dL 20   CREATININE mg/dL 0.73   SODIUM mmol/L 143   POTASSIUM mmol/L 4.4   CHLORIDE mmol/L 104   CALCIUM mg/dL 9.9   ALBUMIN g/dL 4.6   BILIRUBIN mg/dL 0.6   ALK PHOS U/L 59   AST (SGOT) U/L 15   ALT (SGPT) U/L 11   TRIGLYCERIDES mg/dL 66   HDL CHOL mg/dL 70*   VLDL CHOL mg/dL 11   LDL CHOL mg/dL 153*   LDL/HDL RATIO  2.15   HEMOGLOBIN A1C % 4.70*   WBC 10*3/mm3 5.95   RBC 10*6/mm3 4.63   HEMATOCRIT % 41.0   MCV fL 88.6   MCH pg 29.6   TSH uIU/mL 0.977   VIT D 25 HYDROXY ng/ml 56.0         Lab Results   Component Value Date    BUN 20 06/08/2023    CREATININE 0.73 06/08/2023     06/08/2023    K 4.4 06/08/2023     06/08/2023    CALCIUM 9.9 06/08/2023    ALBUMIN 4.6 06/08/2023    BILITOT 0.6 06/08/2023    ALKPHOS 59 06/08/2023    AST 15 06/08/2023    ALT 11 06/08/2023    TRIG 66 06/08/2023    HDL 70 (H) 06/08/2023    VLDL 11 06/08/2023     (H) 06/08/2023    LDLHDL 2.15 06/08/2023    WBC 5.95 06/08/2023    RBC 4.63 06/08/2023    HCT 41.0 06/08/2023    MCV 88.6 06/08/2023    MCH 29.6 06/08/2023    TSH 0.977 06/08/2023    VGNS46KE 56.0 06/08/2023      Assessment & Plan   Order Review Tab  Health Maintenance Tab  Patient Plan/Order Tab  Diagnoses and all orders for this visit:    1. Preventative health care (Primary)  Comments:  discussed all recommendations. doing excellent with weight loss  Assessment & Plan:   Discussed all recommendations       2. Vitamin D deficiency    3. Acquired hypothyroidism  Comments:  stay on current meds    Other orders  -     nabumetone (RELAFEN) 750 MG tablet; Take 1 tablet by mouth 2 (Two) Times a Day.  Dispense: 180 tablet; Refill: 3      1. Medicare annual wellness visit, subsequent (Primary)  - The patient is doing well overall.  - She is up to date on her preventative care.  - She is due for a  mammogram in 12/2023.  - She is due for a DEXA scan in 12/2024.  - She is due for a colonoscopy in 2027.    2. Essential hypertension  - The patient's blood pressure is slightly elevated today.  - She will continue to monitor her blood pressure at home.    3. Hyperlipidemia, unspecified hyperlipidemia type  - The patient will continue to work on weight loss.    4. Bilateral carpal tunnel syndrome  - The patient will continue to wear her brace at night.  - She will continue to follow up with her hand surgeon.    5. Arthritis  - The patient will continue to take nabumetone as prescribed.    6. Urinary incontinence, unspecified type  - The patient will continue to take Myrbetriq as prescribed.    7. Dry eyes  - The patient will continue to take magnesium as prescribed.    Wrapup Tab  Return in about 1 year (around 6/15/2024), or if symptoms worsen or fail to improve, for Annual physical.       During this visit for their annual exam, we reviewed their personal history, social history and family history.  We went over their medications and all the recommended health maintenence items for their age group. They were given the opportunity to ask questions and discuss other concerns.        Transcribed from ambient dictation for Kerry Mayes MD by Adela Mathews.  06/15/23   15:50 EDT    Patient or patient representative verbalized consent to the visit recording.  I have personally performed the services described in this document as transcribed by the above individual, and it is both accurate and complete.  Kerry Mayes MD  6/18/2023  08:38 EDT
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
## 2023-09-14 ENCOUNTER — OFFICE VISIT (OUTPATIENT)
Dept: PAIN MEDICINE | Facility: CLINIC | Age: 63
End: 2023-09-14
Payer: COMMERCIAL

## 2023-09-14 ENCOUNTER — TELEPHONE (OUTPATIENT)
Dept: PAIN MEDICINE | Facility: CLINIC | Age: 63
End: 2023-09-14

## 2023-09-14 VITALS
SYSTOLIC BLOOD PRESSURE: 123 MMHG | HEART RATE: 57 BPM | OXYGEN SATURATION: 99 % | DIASTOLIC BLOOD PRESSURE: 40 MMHG | RESPIRATION RATE: 16 BRPM

## 2023-09-14 DIAGNOSIS — M53.3 SACROILIAC JOINT DYSFUNCTION: ICD-10-CM

## 2023-09-14 DIAGNOSIS — M47.816 FACET ARTHRITIS OF LUMBAR REGION: ICD-10-CM

## 2023-09-14 DIAGNOSIS — M25.561 RECURRENT PAIN OF RIGHT KNEE: Primary | ICD-10-CM

## 2023-09-14 DIAGNOSIS — M51.36 DDD (DEGENERATIVE DISC DISEASE), LUMBAR: ICD-10-CM

## 2023-09-14 NOTE — TELEPHONE ENCOUNTER
Caller: More Staton    Relationship to patient: Self    Best call back number: [phone]    Patient is needing: RESCHEDULE PROCEDURE ON 10.5.23 TO THE AFTERNOON

## 2023-09-18 ENCOUNTER — TELEPHONE (OUTPATIENT)
Dept: PAIN MEDICINE | Facility: CLINIC | Age: 63
End: 2023-09-18
Payer: COMMERCIAL

## 2023-09-18 NOTE — TELEPHONE ENCOUNTER
HUB AGENT ATTEMPTED NON-CLINICAL WARM TRANSFER - NO ANSWER     PLEASE CALL / LEAVE VMAIL WITH TAX ID # FOR NEW ALB PAIN MGMT SHERRY -NOEMÍ @[place] [facility] HAS DR MARTI's NPI # BUT NEEDS TAX ID # TO OBTAIN UMR AUTH BEFORE SCHEDULING LOWER LUMBAR MRI     THANKS

## 2023-09-21 ENCOUNTER — TELEPHONE (OUTPATIENT)
Dept: PAIN MEDICINE | Facility: CLINIC | Age: 63
End: 2023-09-21
Payer: COMMERCIAL

## 2023-09-21 NOTE — TELEPHONE ENCOUNTER
MRI lumbar spine without contrast-9/21/2023-open sided  - Moderate multilevel disc desiccation  L2-3 to L4-5-mild disc bulge causing moderate bilateral neuroforaminal narrowing with mild L3-4 spinal canal stenosis  L4-S1-WNL.    Kalpesh Small DO  Pain Management   [facility]

## 2023-10-04 DIAGNOSIS — M47.816 LUMBAR SPONDYLOSIS: Primary | ICD-10-CM

## 2023-10-10 ENCOUNTER — HOSPITAL ENCOUNTER (OUTPATIENT)
Dept: PAIN MEDICINE | Facility: HOSPITAL | Age: 63
Discharge: HOME | End: 2023-10-10
Payer: COMMERCIAL

## 2023-10-10 VITALS
WEIGHT: 170 LBS | TEMPERATURE: 97.3 F | BODY MASS INDEX: 26.68 KG/M2 | OXYGEN SATURATION: 99 % | SYSTOLIC BLOOD PRESSURE: 158 MMHG | DIASTOLIC BLOOD PRESSURE: 62 MMHG | HEIGHT: 67 IN | RESPIRATION RATE: 16 BRPM | HEART RATE: 53 BPM

## 2023-10-10 DIAGNOSIS — R52 PAIN: ICD-10-CM

## 2023-10-10 DIAGNOSIS — M25.561 RECURRENT PAIN OF RIGHT KNEE: Primary | ICD-10-CM

## 2023-10-10 PROCEDURE — 25010000002 METHYLPREDNISOLONE PER 40 MG: Performed by: STUDENT IN AN ORGANIZED HEALTH CARE EDUCATION/TRAINING PROGRAM

## 2023-10-10 PROCEDURE — 25010000002 BUPIVACAINE (PF) 0.25 % SOLUTION: Performed by: STUDENT IN AN ORGANIZED HEALTH CARE EDUCATION/TRAINING PROGRAM

## 2023-10-10 PROCEDURE — 64624 DSTRJ NULYT AGT GNCLR NRV: CPT | Performed by: STUDENT IN AN ORGANIZED HEALTH CARE EDUCATION/TRAINING PROGRAM

## 2023-10-10 PROCEDURE — 77003 FLUOROGUIDE FOR SPINE INJECT: CPT

## 2023-10-10 RX ORDER — METHYLPREDNISOLONE ACETATE 40 MG/ML
40 INJECTION, SUSPENSION INTRA-ARTICULAR; INTRALESIONAL; INTRAMUSCULAR; SOFT TISSUE ONCE
Status: COMPLETED | OUTPATIENT
Start: 2023-10-10 | End: 2023-10-10

## 2023-10-10 RX ORDER — BUPIVACAINE HYDROCHLORIDE 2.5 MG/ML
10 INJECTION, SOLUTION EPIDURAL; INFILTRATION; INTRACAUDAL ONCE
Status: COMPLETED | OUTPATIENT
Start: 2023-10-10 | End: 2023-10-10

## 2023-10-10 RX ORDER — LIDOCAINE HYDROCHLORIDE 20 MG/ML
10 INJECTION, SOLUTION INFILTRATION; PERINEURAL ONCE
Status: COMPLETED | OUTPATIENT
Start: 2023-10-10 | End: 2023-10-10

## 2023-10-10 RX ORDER — LIDOCAINE HYDROCHLORIDE 10 MG/ML
5 INJECTION, SOLUTION EPIDURAL; INFILTRATION; INTRACAUDAL; PERINEURAL ONCE
Status: COMPLETED | OUTPATIENT
Start: 2023-10-10 | End: 2023-10-10

## 2023-10-10 RX ADMIN — BUPIVACAINE HYDROCHLORIDE 10 ML: 2.5 INJECTION, SOLUTION EPIDURAL; INFILTRATION; INTRACAUDAL; PERINEURAL at 14:59

## 2023-10-10 RX ADMIN — LIDOCAINE HYDROCHLORIDE 10 ML: 20 INJECTION, SOLUTION INFILTRATION; PERINEURAL at 14:56

## 2023-10-10 RX ADMIN — METHYLPREDNISOLONE ACETATE 40 MG: 40 INJECTION, SUSPENSION INTRA-ARTICULAR; INTRALESIONAL; INTRAMUSCULAR; INTRASYNOVIAL; SOFT TISSUE at 14:59

## 2023-10-10 RX ADMIN — LIDOCAINE HYDROCHLORIDE 5 ML: 10 INJECTION, SOLUTION EPIDURAL; INFILTRATION; INTRACAUDAL; PERINEURAL at 14:41

## 2023-10-10 NOTE — ADDENDUM NOTE
Encounter addended by: Kalpesh Small DO on: 10/10/2023 4:04 PM   Actions taken: Problem List reviewed, Medication List reviewed, Allergies reviewed

## 2023-10-10 NOTE — PROCEDURES
Preoperative Diagnosis: Osteoarthritis Knee- Right    Postoperative Diagnosis: Same    Procedure:  RFA Genicular Nerve Block- SL, SM, IM    Procedure Details: The patient's chart was reviewed.  After obtaining written informed consent, the patient was placed in a supine position with the right knee exposed.  The skin overlying the right knee was cleaned with chloraprep times two. A 25 g needle was used to anesthetize the skin with 1% lidocaine on the superior medial, superior lateral part of the femur and infero medial part of the tibia.  Two 20 gauge RFL 50 mm insulated needle with 5 mm active tip were inserted 1cm apart from each other through the skin on the SM and SL aspect of the femur.  Another 2 needles were placed on the IM part of the tibia under fluoroscopic guidance.   Following placement of the needle sensory stimulation at 50 Hz and motor stimulation at 2 HZ was carried out. The patient reported reproduction of pain or sensation in the area of symptoms. RFL was carried out in lesion mode after injecting 1.5 ml of 2% lidocaine with 0.25% bupivicaine and 40 mg depomedrol after negative aspiration.  The settings were 80 øC, and 120 seconds in bio polar mode.  Following the procedure the patient's vital signs were stable. The patient was discharged home in good condition after being given discharge instructions.    COMPLICATIONS None    Kalpesh Small DO  Pain Management   [facility]

## 2023-10-10 NOTE — DISCHARGE INSTRUCTIONS
Radiofrequency Lesioning, Care After    Refer to this sheet in the next few weeks. These instructions provide you with information about caring for yourself after your procedure. Your health care provider may also give you more specific instructions. Your treatment has been planned according to current medical practices, but problems sometimes occur. Call your health care provider if you have any problems or questions after your procedure.  What can I expect after the procedure?  After the procedure, it is common to have:  Pain from the burned nerve.  You may feel a burning sensation for up to 1-2 weeks after the procedure  Temporary numbness at the site  Your leg/legs may be weak or feel numb after the procedure until the numbing medication wears off.  When you are up and walking, have assistance to prevent falling.     Follow these instructions at home:  Take over-the-counter and prescription medicines only as told by your health care provider.  Return to your normal activities as told by your health care provider. Ask your health care provider what activities are safe for you.  Pay close attention to how you feel after the procedure. If you start to have pain, write down when it hurts and how it feels. This will help you and your health care provider to know if you need an additional treatment.  Check your needle insertion site every day for signs of infection. Watch for:  Redness, swelling, or pain.  Fluid, blood, or pus.  Keep all follow-up visits as told by your health care provider. This is important.  No driving for 24 hrs-make sure you have full sensation in your legs prior to driving.  Avoid using heat on the injection site for 24 hours. You may use ice intermittently if needed by placing a               towel between your skin and the ice bag and using the ice for 20 minutes 2-3 times a day.  Do not take baths, swim or use a hot tub for 24 hours.  Leave your band-aids on for 24 hours and keep them  dry.    Contact a health care provider if:  Your pain does not get better.  You have redness, swelling, or pain at the needle insertion site.  You have fluid, blood, or pus coming from the needle insertion site.  You have a fever over 101 degrees.  You have new numb.ness in your arm or leg after the procedure    Get help right away if:  You develop sudden, severe pain.  You develop numbness or tingling near the procedure site that does not go away.  This information is not intended to replace advice given to you by your health care provider. Make sure you discuss any questions you have with your health care provider.  Document Released: 08/16/2012 Document Revised: 05/25/2017 Document Reviewed: 01/25/2016  [employer] Interactive Patient Education c 2019 [employer]

## 2023-10-10 NOTE — H&P
H and P reviewed from previous visit and no changes to patient's clinical presentation. Will proceed with procedure as planned. Patient denies history of DM and being on blood thinners.    Kalpesh Small DO  Pain Management   [facility]

## 2023-10-11 ENCOUNTER — TELEPHONE (OUTPATIENT)
Dept: PAIN MEDICINE | Facility: HOSPITAL | Age: 63
End: 2023-10-11
Payer: COMMERCIAL

## 2023-10-11 NOTE — TELEPHONE ENCOUNTER
Post procedure phone call completed.  Pt states they are doing good and denies questions or concerns. Pt denies any pain this morning.

## 2023-10-19 ENCOUNTER — TRANSCRIBE ORDERS (OUTPATIENT)
Dept: FAMILY MEDICINE CLINIC | Facility: CLINIC | Age: 63
End: 2023-10-19
Payer: COMMERCIAL

## 2023-10-19 ENCOUNTER — PATIENT MESSAGE (OUTPATIENT)
Dept: FAMILY MEDICINE CLINIC | Facility: CLINIC | Age: 63
End: 2023-10-19
Payer: COMMERCIAL

## 2023-10-19 DIAGNOSIS — Z12.31 SCREENING MAMMOGRAM FOR BREAST CANCER: Primary | ICD-10-CM

## 2023-10-19 DIAGNOSIS — Z12.31 ENCOUNTER FOR SCREENING MAMMOGRAM FOR MALIGNANT NEOPLASM OF BREAST: Primary | ICD-10-CM

## 2023-10-20 NOTE — TELEPHONE ENCOUNTER
From: More Staton  To: David Munoz  Sent: 10/19/2023 8:53 AM EDT  Subject: Mammogram     Need a order sent to [facility] for a mammogram,I have scheduled for December 4 @ 9 am Thank you More Staton

## 2023-11-02 ENCOUNTER — HOSPITAL ENCOUNTER (OUTPATIENT)
Dept: PAIN MEDICINE | Facility: HOSPITAL | Age: 63
Discharge: HOME | End: 2023-11-02
Payer: COMMERCIAL

## 2023-11-02 VITALS
OXYGEN SATURATION: 100 % | SYSTOLIC BLOOD PRESSURE: 141 MMHG | BODY MASS INDEX: 26.68 KG/M2 | HEART RATE: 52 BPM | TEMPERATURE: 97.3 F | DIASTOLIC BLOOD PRESSURE: 59 MMHG | HEIGHT: 67 IN | WEIGHT: 170 LBS | RESPIRATION RATE: 16 BRPM

## 2023-11-02 DIAGNOSIS — M47.816 LUMBAR SPONDYLOSIS: Primary | ICD-10-CM

## 2023-11-02 DIAGNOSIS — R52 PAIN: ICD-10-CM

## 2023-11-02 PROCEDURE — 77003 FLUOROGUIDE FOR SPINE INJECT: CPT

## 2023-11-02 RX ORDER — BUPIVACAINE HYDROCHLORIDE 2.5 MG/ML
10 INJECTION, SOLUTION EPIDURAL; INFILTRATION; INTRACAUDAL ONCE
Status: DISCONTINUED | OUTPATIENT
Start: 2023-11-02 | End: 2023-11-02

## 2023-11-02 RX ORDER — METHYLPREDNISOLONE ACETATE 40 MG/ML
40 INJECTION, SUSPENSION INTRA-ARTICULAR; INTRALESIONAL; INTRAMUSCULAR; SOFT TISSUE ONCE
Status: DISCONTINUED | OUTPATIENT
Start: 2023-11-02 | End: 2023-11-02

## 2023-11-02 RX ORDER — LIDOCAINE HYDROCHLORIDE 20 MG/ML
10 INJECTION, SOLUTION INFILTRATION; PERINEURAL ONCE
Status: COMPLETED | OUTPATIENT
Start: 2023-11-02 | End: 2023-11-02

## 2023-11-02 RX ORDER — LIDOCAINE HYDROCHLORIDE 10 MG/ML
5 INJECTION, SOLUTION EPIDURAL; INFILTRATION; INTRACAUDAL; PERINEURAL ONCE
Status: COMPLETED | OUTPATIENT
Start: 2023-11-02 | End: 2023-11-02

## 2023-11-02 RX ADMIN — LIDOCAINE HYDROCHLORIDE 10 ML: 20 INJECTION, SOLUTION INFILTRATION; PERINEURAL at 10:30

## 2023-11-02 RX ADMIN — LIDOCAINE HYDROCHLORIDE 5 ML: 10 INJECTION, SOLUTION EPIDURAL; INFILTRATION; INTRACAUDAL; PERINEURAL at 10:16

## 2023-11-02 NOTE — PROCEDURES
Preoperative Diagnosis:    Lumbar Spondylosis                                              Postoperative Diagnosis: SAME    PROCEDURE:  Radiofrequency Ablation (RFA) of Lumbar Medial Branch at Left L 4, 5, SA and S1    NOTE:  After obtaining written informed consent patient was taken to the procedure room. Pre-procedure blood pressure and pulse were stable and recorded in patients clinic chart.   The patient was placed in a prone position and lumbar area was prepped with chloraprep times two and draped in the usual sterile fashion.  The skin over the left L 4 transverse process with vertebral body was infiltrated with 1% lidocaine for local anesthesia.  A 20-gauge  mm needle with 5 mm active was inserted into the left L 4 medial branch under radiographic guidance. Both AP and lateral views were obtained. The identical procedure was performed on left L 5, SA medial branch along with S 1 lateral branch. Following placement of the needle sensory stimulation at 50 Hz and motor stimulation at 2 Hz was carried out. 1 cc of 2% lidocaine was injected. RFA was carried out in lesion mode after patient reporting reproduction of pain or sensation in the area of symptoms and denying extremity motor sensations. The settings were 80°C, and 90 seconds. During the procedure no pain was reported in the extremities by the patient.     After the procedure the needles were removed. Skin was cleaned and a sterile dressing was applied. Following the procedure the patient's vital signs were stable. The patient was discharged.     COMPLICATIONS: None    RFA DATA: In chart    Kalpesh Small DO  Pain Management   [facility]

## 2023-11-02 NOTE — DISCHARGE INSTRUCTIONS
Radiofrequency Lesioning, Care After    Refer to this sheet in the next few weeks. These instructions provide you with information about caring for yourself after your procedure. Your health care provider may also give you more specific instructions. Your treatment has been planned according to current medical practices, but problems sometimes occur. Call your health care provider if you have any problems or questions after your procedure.  What can I expect after the procedure?  After the procedure, it is common to have:  Pain from the burned nerve.  You may feel a burning sensation for up to 1-2 weeks after the procedure  Temporary numbness at the site  Your leg/legs may be weak or feel numb after the procedure until the numbing medication wears off.  When you are up and walking, have assistance to prevent falling.     Follow these instructions at home:  Take over-the-counter and prescription medicines only as told by your health care provider.  Return to your normal activities as told by your health care provider. Ask your health care provider what activities are safe for you.  Pay close attention to how you feel after the procedure. If you start to have pain, write down when it hurts and how it feels. This will help you and your health care provider to know if you need an additional treatment.  Check your needle insertion site every day for signs of infection. Watch for:  Redness, swelling, or pain.  Fluid, blood, or pus.  Keep all follow-up visits as told by your health care provider. This is important.  No driving for 24 hrs-make sure you have full sensation in your legs prior to driving.  Avoid using heat on the injection site for 24 hours. You may use ice intermittently if needed by placing a               towel between your skin and the ice bag and using the ice for 20 minutes 2-3 times a day.  Do not take baths, swim or use a hot tub for 24 hours.  Leave your band-aids on for 24 hours and keep them  dry.    Contact a health care provider if:  Your pain does not get better.  You have redness, swelling, or pain at the needle insertion site.  You have fluid, blood, or pus coming from the needle insertion site.  You have a fever over 101 degrees.  You have new numb.ness in your arm or leg after the procedure    Get help right away if:  You develop sudden, severe pain.  You develop numbness or tingling near the procedure site that does not go away.  This information is not intended to replace advice given to you by your health care provider. Make sure you discuss any questions you have with your health care provider.  Document Released: 08/16/2012 Document Revised: 05/25/2017 Document Reviewed: 01/25/2016  [employer] Interactive Patient Education © 2019 [employer]

## 2023-11-03 ENCOUNTER — TELEPHONE (OUTPATIENT)
Dept: PAIN MEDICINE | Facility: HOSPITAL | Age: 63
End: 2023-11-03
Payer: COMMERCIAL

## 2023-11-09 ENCOUNTER — HOSPITAL ENCOUNTER (OUTPATIENT)
Dept: PAIN MEDICINE | Facility: HOSPITAL | Age: 63
Discharge: HOME | End: 2023-11-09
Payer: COMMERCIAL

## 2023-11-09 VITALS
RESPIRATION RATE: 16 BRPM | OXYGEN SATURATION: 98 % | WEIGHT: 170 LBS | BODY MASS INDEX: 26.68 KG/M2 | SYSTOLIC BLOOD PRESSURE: 147 MMHG | TEMPERATURE: 97.1 F | DIASTOLIC BLOOD PRESSURE: 86 MMHG | HEART RATE: 56 BPM | HEIGHT: 67 IN

## 2023-11-09 DIAGNOSIS — M47.816 LUMBAR SPONDYLOSIS: Primary | ICD-10-CM

## 2023-11-09 DIAGNOSIS — R52 PAIN: ICD-10-CM

## 2023-11-09 PROCEDURE — 77003 FLUOROGUIDE FOR SPINE INJECT: CPT

## 2023-11-09 RX ORDER — LIDOCAINE HYDROCHLORIDE 20 MG/ML
10 INJECTION, SOLUTION INFILTRATION; PERINEURAL ONCE
Status: COMPLETED | OUTPATIENT
Start: 2023-11-09 | End: 2023-11-09

## 2023-11-09 RX ORDER — LIDOCAINE HYDROCHLORIDE 10 MG/ML
5 INJECTION, SOLUTION EPIDURAL; INFILTRATION; INTRACAUDAL; PERINEURAL ONCE
Status: COMPLETED | OUTPATIENT
Start: 2023-11-09 | End: 2023-11-09

## 2023-11-09 RX ADMIN — LIDOCAINE HYDROCHLORIDE 10 ML: 20 INJECTION, SOLUTION INFILTRATION; PERINEURAL at 09:58

## 2023-11-09 RX ADMIN — LIDOCAINE HYDROCHLORIDE 5 ML: 10 INJECTION, SOLUTION EPIDURAL; INFILTRATION; INTRACAUDAL; PERINEURAL at 09:48

## 2023-11-09 NOTE — H&P
H and P reviewed from previous visit and no changes to patient's clinical presentation. Will proceed with procedure as planned. Patient denies history of DM and being on blood thinners.    Kalpesh Small DO  Pain Management   [facility]

## 2023-11-09 NOTE — DISCHARGE INSTRUCTIONS
Radiofrequency Lesioning, Care After    Refer to this sheet in the next few weeks. These instructions provide you with information about caring for yourself after your procedure. Your health care provider may also give you more specific instructions. Your treatment has been planned according to current medical practices, but problems sometimes occur. Call your health care provider if you have any problems or questions after your procedure.  What can I expect after the procedure?  After the procedure, it is common to have:  Pain from the burned nerve.  You may feel a burning sensation for up to 1-2 weeks after the procedure  Temporary numbness at the site  Your leg/legs may be weak or feel numb after the procedure until the numbing medication wears off.  When you are up and walking, have assistance to prevent falling.     Follow these instructions at home:  Take over-the-counter and prescription medicines only as told by your health care provider.  Return to your normal activities as told by your health care provider. Ask your health care provider what activities are safe for you.  Pay close attention to how you feel after the procedure. If you start to have pain, write down when it hurts and how it feels. This will help you and your health care provider to know if you need an additional treatment.  Check your needle insertion site every day for signs of infection. Watch for:  Redness, swelling, or pain.  Fluid, blood, or pus.  Keep all follow-up visits as told by your health care provider. This is important.  No driving for 24 hrs-make sure you have full sensation in your legs prior to driving.  Avoid using heat on the injection site for 24 hours. You may use ice intermittently if needed by placing a               towel between your skin and the ice bag and using the ice for 20 minutes 2-3 times a day.  Do not take baths, swim or use a hot tub for 24 hours.  Leave your band-aids on for 24 hours and keep them  dry.    Contact a health care provider if:  Your pain does not get better.  You have redness, swelling, or pain at the needle insertion site.  You have fluid, blood, or pus coming from the needle insertion site.  You have a fever over 101 degrees.  You have new numb.ness in your arm or leg after the procedure    Get help right away if:  You develop sudden, severe pain.  You develop numbness or tingling near the procedure site that does not go away.  This information is not intended to replace advice given to you by your health care provider. Make sure you discuss any questions you have with your health care provider.  Document Released: 08/16/2012 Document Revised: 05/25/2017 Document Reviewed: 01/25/2016  [employer] Interactive Patient Education © 2019 [employer]

## 2023-11-10 ENCOUNTER — TELEPHONE (OUTPATIENT)
Dept: PAIN MEDICINE | Facility: HOSPITAL | Age: 63
End: 2023-11-10
Payer: COMMERCIAL
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
## 2023-11-27 ENCOUNTER — OFFICE VISIT (OUTPATIENT)
Dept: PAIN MEDICINE | Facility: CLINIC | Age: 63
End: 2023-11-27
Payer: COMMERCIAL

## 2023-11-27 VITALS
SYSTOLIC BLOOD PRESSURE: 124 MMHG | RESPIRATION RATE: 16 BRPM | HEART RATE: 52 BPM | OXYGEN SATURATION: 99 % | DIASTOLIC BLOOD PRESSURE: 47 MMHG

## 2023-11-27 DIAGNOSIS — M47.816 LUMBAR SPONDYLOSIS: ICD-10-CM

## 2023-11-27 DIAGNOSIS — M25.561 RECURRENT PAIN OF RIGHT KNEE: ICD-10-CM

## 2023-11-27 DIAGNOSIS — M53.3 SACROILIAC JOINT DYSFUNCTION: Primary | ICD-10-CM

## 2023-11-27 PROCEDURE — 99213 OFFICE O/P EST LOW 20 MIN: CPT | Performed by: STUDENT IN AN ORGANIZED HEALTH CARE EDUCATION/TRAINING PROGRAM

## 2023-12-04 ENCOUNTER — HOSPITAL ENCOUNTER (OUTPATIENT)
Dept: MAMMOGRAPHY | Facility: HOSPITAL | Age: 63
Discharge: HOME | End: 2023-12-04
Admitting: STUDENT IN AN ORGANIZED HEALTH CARE EDUCATION/TRAINING PROGRAM
Payer: COMMERCIAL

## 2023-12-04 DIAGNOSIS — Z12.31 SCREENING MAMMOGRAM FOR BREAST CANCER: ICD-10-CM

## 2023-12-04 PROCEDURE — 77067 SCR MAMMO BI INCL CAD: CPT

## 2023-12-04 PROCEDURE — 77063 BREAST TOMOSYNTHESIS BI: CPT

## 2023-12-05 ENCOUNTER — PATIENT MESSAGE (OUTPATIENT)
Dept: FAMILY MEDICINE CLINIC | Facility: CLINIC | Age: 63
End: 2023-12-05
Payer: COMMERCIAL

## 2023-12-05 DIAGNOSIS — M70.62 TROCHANTERIC BURSITIS OF LEFT HIP: Primary | ICD-10-CM

## 2023-12-05 DIAGNOSIS — M25.559 HIP PAIN, UNSPECIFIED LATERALITY: ICD-10-CM

## 2023-12-05 DIAGNOSIS — Z78.0 ASYMPTOMATIC MENOPAUSAL STATE: Primary | ICD-10-CM

## 2023-12-05 NOTE — TELEPHONE ENCOUNTER
From: More Staton  To: David Munoz  Sent: 12/5/2023 12:55 PM EST  Subject: Bone density test     Can you order a bone density test I am due one by end of December. Thank you, More Staton

## 2023-12-13 ENCOUNTER — TREATMENT (OUTPATIENT)
Dept: PHYSICAL THERAPY | Facility: CLINIC | Age: 63
End: 2023-12-13
Payer: COMMERCIAL

## 2023-12-13 DIAGNOSIS — M54.50 CHRONIC LOW BACK PAIN, UNSPECIFIED BACK PAIN LATERALITY, UNSPECIFIED WHETHER SCIATICA PRESENT: ICD-10-CM

## 2023-12-13 DIAGNOSIS — M25.552 LEFT HIP PAIN: ICD-10-CM

## 2023-12-13 DIAGNOSIS — M70.62 TROCHANTERIC BURSITIS OF LEFT HIP: Primary | ICD-10-CM

## 2023-12-13 DIAGNOSIS — G89.29 CHRONIC LOW BACK PAIN, UNSPECIFIED BACK PAIN LATERALITY, UNSPECIFIED WHETHER SCIATICA PRESENT: ICD-10-CM

## 2023-12-18 ENCOUNTER — TREATMENT (OUTPATIENT)
Dept: PHYSICAL THERAPY | Facility: CLINIC | Age: 63
End: 2023-12-18
Payer: COMMERCIAL

## 2023-12-18 DIAGNOSIS — M70.62 TROCHANTERIC BURSITIS OF LEFT HIP: Primary | ICD-10-CM

## 2023-12-18 DIAGNOSIS — M54.50 CHRONIC LOW BACK PAIN, UNSPECIFIED BACK PAIN LATERALITY, UNSPECIFIED WHETHER SCIATICA PRESENT: ICD-10-CM

## 2023-12-18 DIAGNOSIS — M25.552 LEFT HIP PAIN: ICD-10-CM

## 2023-12-18 DIAGNOSIS — G89.29 CHRONIC LOW BACK PAIN, UNSPECIFIED BACK PAIN LATERALITY, UNSPECIFIED WHETHER SCIATICA PRESENT: ICD-10-CM

## 2023-12-18 NOTE — PROGRESS NOTES
Physical Therapy Daily Treatment Note  [facility]   [address] [place] [address]      Patient: More Staton  : 1960  Referring Practitioner: Kalpesh Small DO  Date of Initial Visit: Type: THERAPY  Noted: 2023  Today's Date: 2023  Patient seen for: 2 sessions      Visit Diagnoses:     ICD-10-CM ICD-9-CM   1. Trochanteric bursitis of left hip  M70.62 726.5   2. Left hip pain  M25.552 719.45   3. Chronic low back pain, unspecified back pain laterality, unspecified whether sciatica present  M54.50 724.2    G89.29 338.29       Subjective   More Staton reports: that she worked a shift today and has since been sitting/driving etc. Which is what is most bothersome to her. States that her hip and low back are equally painful.     Pain Level (0-10): 7    Objective     See Exercise, Manual, and Modality Logs for complete treatment.     Patient Education: HEP per [employer] code [id number]; review of logroll    Assessment & Plan       Assessment  Assessment details: - initiated therex for core engagement and hip strengthening/stabilization today. Limited tolerance to supine positioning.   - Pain exacerbated with initial attempt at sitting up from plinth, poor mechanics, education and practice of log roll.  - followed with lumbar extension stretches and hip flexor stretches at stair, which provide most relief to low back pain.       Progress per Plan of Care and Progress strengthening /stabilization /functional activity          Timed:         Manual Therapy:         mins  01463;     Therapeutic Exercise:    25     mins  58369;     Neuromuscular Mili:        mins  32234;    Therapeutic Activity:     15     mins  34947;     Gait Training:           mins  06400;     Ultrasound:          mins  11630;    Ionto                                   mins   67413  Self Care                            mins   91904      Un-Timed:  Electrical Stimulation:         mins  00256 (  );  Traction          mins 73062    No Charge:   [place]:        mins  Hydrocollator MHP:         mins      Timed Treatment:   40   mins   Total Treatment:     40   mins      Sepideh Watters PTA  Physical Therapist Assistant  [place] License: [id number]

## 2023-12-20 ENCOUNTER — TREATMENT (OUTPATIENT)
Dept: PHYSICAL THERAPY | Facility: CLINIC | Age: 63
End: 2023-12-20
Payer: COMMERCIAL

## 2023-12-20 DIAGNOSIS — M25.552 LEFT HIP PAIN: ICD-10-CM

## 2023-12-20 DIAGNOSIS — M54.50 CHRONIC LOW BACK PAIN, UNSPECIFIED BACK PAIN LATERALITY, UNSPECIFIED WHETHER SCIATICA PRESENT: ICD-10-CM

## 2023-12-20 DIAGNOSIS — M70.62 TROCHANTERIC BURSITIS OF LEFT HIP: Primary | ICD-10-CM

## 2023-12-20 DIAGNOSIS — G89.29 CHRONIC LOW BACK PAIN, UNSPECIFIED BACK PAIN LATERALITY, UNSPECIFIED WHETHER SCIATICA PRESENT: ICD-10-CM

## 2023-12-20 NOTE — PROGRESS NOTES
"Physical Therapy Daily Treatment Note  [facility]   [address] [place] [address]      Patient: More Staton  : 1960  Referring Practitioner: Kalpesh Small DO  Date of Initial Visit: Type: THERAPY  Noted: 2023  Today's Date: 2023  Patient seen for: 3 sessions      Visit Diagnoses:     ICD-10-CM ICD-9-CM   1. Trochanteric bursitis of left hip  M70.62 726.5   2. Left hip pain  M25.552 719.45   3. Chronic low back pain, unspecified back pain laterality, unspecified whether sciatica present  M54.50 724.2    G89.29 338.29       Subjective   More Staton reports she was sore following her last session, but reports she was expecting that. States that she did work 9 hours yesterday and pain was, \"off the charts\" by the time she got home.     Pain Level (0-10): 7    Objective     See Exercise, Manual, and Modality Logs for complete treatment.     Patient Education: continue current HEP    Assessment & Plan       Assessment  Assessment details: - continued therex for core engagement and hip strengthening/stabilization today. Better tolerance to supine positioning today, however, low back pain returns with sitting up EOB.   - added stretches for hips and distal LE's today for pt to perform during working hours.   - Trailed LAD of L LE due to groin discomfort today, reports reduction of symptoms in hip.       Progress per Plan of Care and Progress strengthening /stabilization /functional activity          Timed:         Manual Therapy:   3      mins  96090;     Therapeutic Exercise:    25     mins  81484;     Neuromuscular Mili:        mins  97778;    Therapeutic Activity:     15     mins  59998;     Gait Training:           mins  90228;     Ultrasound:          mins  97612;    Ionto                                   mins   02438  Self Care                            mins   56540      Un-Timed:  Electrical Stimulation:         mins  15711 ( );  Traction          mins " [id number]    No Charge:   Cryopack:        mins  Hydrocollator MHP:         mins      Timed Treatment:   43   mins   Total Treatment:     43   mins      Sepideh Watters PTA  Physical Therapist Assistant  [place] License: [id number]

## 2023-12-26 ENCOUNTER — TREATMENT (OUTPATIENT)
Dept: PHYSICAL THERAPY | Facility: CLINIC | Age: 63
End: 2023-12-26
Payer: COMMERCIAL

## 2023-12-26 DIAGNOSIS — G89.29 CHRONIC LOW BACK PAIN, UNSPECIFIED BACK PAIN LATERALITY, UNSPECIFIED WHETHER SCIATICA PRESENT: ICD-10-CM

## 2023-12-26 DIAGNOSIS — M70.62 TROCHANTERIC BURSITIS OF LEFT HIP: Primary | ICD-10-CM

## 2023-12-26 DIAGNOSIS — M25.552 LEFT HIP PAIN: ICD-10-CM

## 2023-12-26 DIAGNOSIS — M54.50 CHRONIC LOW BACK PAIN, UNSPECIFIED BACK PAIN LATERALITY, UNSPECIFIED WHETHER SCIATICA PRESENT: ICD-10-CM

## 2023-12-26 NOTE — PROGRESS NOTES
Physical Therapy Daily Treatment Note  [facility]   [address] [place] [address]      Patient: More Staton  : 1960  Referring Practitioner: Kalpesh Small DO  Date of Initial Visit: Type: THERAPY  Noted: 2023  Today's Date: 2023  Patient seen for: 4 sessions      Visit Diagnoses:     ICD-10-CM ICD-9-CM   1. Trochanteric bursitis of left hip  M70.62 726.5   2. Left hip pain  M25.552 719.45   3. Chronic low back pain, unspecified back pain laterality, unspecified whether sciatica present  M54.50 724.2    G89.29 338.29       Subjective   More Staton reports increase in pain in hip day after last last treatment - unsure of reason - had to walk longer in different shoes. States MD thinks it 9is her back more than hip?    Pain Level (0-10): 7    Objective     See Exercise, Manual, and Modality Logs for complete treatment.     Patient Education: continue current HEP - reviewed Log roll - patient transferred to get up and strained back.    Assessment & Plan       Assessment  Assessment details: - focussed on prior exercise for hip and core; trial of lumbar distraction in hooklying seemed to effectively reduce LBP; long-axis distraction of hip reduced tingling in thing.   Reviewed standing lumbar extension after prolonged sitting and log rolling.       Progress per Plan of Care and Progress strengthening /stabilization /functional activity          Timed:         Manual Therapy:   10      mins  03776;     Therapeutic Exercise:    25     mins  79553;     Neuromuscular Mili:        mins  42339;    Therapeutic Activity:     10     mins  44576;     Gait Training:           mins  80875;     Ultrasound:          mins  23288;    Ionto                                   mins   38503  Self Care                            mins   53421      Un-Timed:  Electrical Stimulation:         mins  42039 ( );  Traction          mins 00894    No Charge:   Cryopack:         mins  Hydrocollator MHP:         mins      Timed Treatment:   45   mins   Total Treatment:     45   mins      Michi Juarez PT, DPT  Physical Therapist [place] License #: [id number]

## 2023-12-28 ENCOUNTER — TREATMENT (OUTPATIENT)
Dept: PHYSICAL THERAPY | Facility: CLINIC | Age: 63
End: 2023-12-28
Payer: COMMERCIAL

## 2023-12-28 DIAGNOSIS — M25.552 LEFT HIP PAIN: ICD-10-CM

## 2023-12-28 DIAGNOSIS — M70.62 TROCHANTERIC BURSITIS OF LEFT HIP: Primary | ICD-10-CM

## 2023-12-28 DIAGNOSIS — G89.29 CHRONIC LOW BACK PAIN, UNSPECIFIED BACK PAIN LATERALITY, UNSPECIFIED WHETHER SCIATICA PRESENT: ICD-10-CM

## 2023-12-28 DIAGNOSIS — M54.50 CHRONIC LOW BACK PAIN, UNSPECIFIED BACK PAIN LATERALITY, UNSPECIFIED WHETHER SCIATICA PRESENT: ICD-10-CM

## 2023-12-28 NOTE — PROGRESS NOTES
"Physical Therapy Treatment  Note  [address] [place]   [address]     Diagnosis Plan   1. Trochanteric bursitis of left hip        2. Left hip pain        3. Chronic low back pain, unspecified back pain laterality, unspecified whether sciatica present            VISIT#: 5    Subjective   More Staton reports: feeling better since last treatment.  Patient reported improved tolerance to weight bearing on (L) LE.  Patient rated current pain level as 2/10 at (L) side of low back and posterior lateral aspect of (L) hip      Objective     See Exercise, Manual, and Modality Logs for complete treatment.     Patient Education:    Goals- from initial evaluation 12/13/23  Plan Goals: STG's  3 weeks - 6 visits  1. Able to tolerate initial HEP and progression without increase in hip and/or back pain 2. Decrease hip/buttocks  pain > 25% with sleeping, walking, standing and other aggravating activities.      LTG's: by d/c- 12 visits   1. Improve hip strength to allow for return to normal activities without signs of instability or pain.  2.LEFs  outcome measure to score less than 10% disability.  3. Improved abdominal strength via MMT for improved postural support and stability  4. Ability to negotiate 1 flight of stairs without provocation of pain for improved community mobility     Assessment/Plan  - Encouraged to perform PPT with ab stabilization activities this date.  Patient reported she could feel exercise more but denied provocation of pain   - Pt rated (L) posterior/lateral (L) hip pain reduction to 1/10 at conclusion of session.  Patient reported back was \" tender\" from laying on the mat table but this is usual after laying and improves with movement.     Progress per Plan of Care            Timed:         Manual Therapy:    10     mins  75746;     Therapeutic Exercise:    40     mins  82289;     Neuromuscular Mili:    0    mins  65692;    Therapeutic Activity:     0     mins  23350;     Gait Training:    "   0     mins  35197;     Ultrasound:     0     mins  71842;    Ionto                               0    mins   02847  Self Care                       0     mins   26502  Canalith Repos               0    mins  4209  Aquatic                          0     mins 99451    Un-Timed:  Electrical Stimulation:    0     mins  38408 ( );  Dry Needling     0     mins self-pay  Traction     0     mins 22779  Low Eval     0     Mins  03903  Mod Eval     0     Mins  86779  High Eval                       0     Mins  95102  Re-Eval                           0    mins  65265    Timed Treatment:   50   mins   Total Treatment:     50   mins    Salima Rashid, PT PT, DPT, [id number]

## 2024-01-02 ENCOUNTER — TREATMENT (OUTPATIENT)
Dept: PHYSICAL THERAPY | Facility: CLINIC | Age: 64
End: 2024-01-02
Payer: COMMERCIAL

## 2024-01-02 DIAGNOSIS — G89.29 CHRONIC LOW BACK PAIN, UNSPECIFIED BACK PAIN LATERALITY, UNSPECIFIED WHETHER SCIATICA PRESENT: ICD-10-CM

## 2024-01-02 DIAGNOSIS — M54.50 CHRONIC LOW BACK PAIN, UNSPECIFIED BACK PAIN LATERALITY, UNSPECIFIED WHETHER SCIATICA PRESENT: ICD-10-CM

## 2024-01-02 DIAGNOSIS — M25.552 LEFT HIP PAIN: ICD-10-CM

## 2024-01-02 DIAGNOSIS — M70.62 TROCHANTERIC BURSITIS OF LEFT HIP: Primary | ICD-10-CM

## 2024-01-02 PROCEDURE — 97110 THERAPEUTIC EXERCISES: CPT

## 2024-01-02 NOTE — PROGRESS NOTES
Physical Therapy Treatment  Note  [address] [place]   [address]     Diagnosis Plan   1. Trochanteric bursitis of left hip        2. Left hip pain        3. Chronic low back pain, unspecified back pain laterality, unspecified whether sciatica present            VISIT#: 6    Subjective   More Staton reports: feeing pretty good today.  Patient reported she had a rouch day/increased pain yesterday.  Stated may be related to prolonged sitting while reading a book.      Objective     See Exercise, Manual, and Modality Logs for complete treatment.     Patient Education:    Goals- from initial evaluation 23  Plan Goals: STG's  3 weeks - 6 visits  1. Able to tolerate initial HEP and progression without increase in hip and/or back pain 2. Decrease hip/buttocks  pain > 25% with sleeping, walking, standing and other aggravating activities.      LTG's: by d/c- 12 visits   1. Improve hip strength to allow for return to normal activities without signs of instability or pain.  2.LEFs  outcome measure to score less than 10% disability.  3. Improved abdominal strength via MMT for improved postural support and stability  4. Ability to negotiate 1 flight of stairs without provocation of pain for improved community mobility     Assessment/Plan  - Added prone on elbows and prone TKE this date .  Weakness of (B) quad musculature observed.  HEP handout issued with these 2 exercises.  Encouraged to perform at home if no increased back pain.   Progress per Plan of Care            Timed:         Manual Therapy:    5    mins  26305;     Therapeutic Exercise:    40     mins  67362;     Neuromuscular Mili:    0    mins  25804;    Therapeutic Activity:     0     mins  14965;     Gait Trainin     mins  27952;     Ultrasound:     0     mins  89841;    Ionto                               0    mins   58091  Self Care                       0     mins   19646  Canalith Repos               0    mins  4209  Aquatic                           0     mins 84720    Un-Timed:  Electrical Stimulation:    0     mins  48092 ( );  Dry Needling     0     mins self-pay  Traction     0     mins 51541  Low Eval     0     Mins  02101  Mod Eval     0     Mins  33396  High Eval                       0     Mins  41944  Re-Eval                           0    mins  09480    Timed Treatment:   45   mins   Total Treatment:     45   mins    Salima Rsahid, PT PT, DPT, [id number]

## 2024-01-03 ENCOUNTER — HOSPITAL ENCOUNTER (OUTPATIENT)
Dept: BONE DENSITY | Facility: HOSPITAL | Age: 64
Discharge: HOME | End: 2024-01-03
Admitting: STUDENT IN AN ORGANIZED HEALTH CARE EDUCATION/TRAINING PROGRAM
Payer: COMMERCIAL

## 2024-01-03 DIAGNOSIS — Z78.0 ASYMPTOMATIC MENOPAUSAL STATE: ICD-10-CM

## 2024-01-03 PROCEDURE — 77080 DXA BONE DENSITY AXIAL: CPT

## 2024-01-04 ENCOUNTER — TREATMENT (OUTPATIENT)
Dept: PHYSICAL THERAPY | Facility: CLINIC | Age: 64
End: 2024-01-04
Payer: COMMERCIAL

## 2024-01-04 DIAGNOSIS — M25.552 LEFT HIP PAIN: ICD-10-CM

## 2024-01-04 DIAGNOSIS — G89.29 CHRONIC LOW BACK PAIN, UNSPECIFIED BACK PAIN LATERALITY, UNSPECIFIED WHETHER SCIATICA PRESENT: ICD-10-CM

## 2024-01-04 DIAGNOSIS — M54.50 CHRONIC LOW BACK PAIN, UNSPECIFIED BACK PAIN LATERALITY, UNSPECIFIED WHETHER SCIATICA PRESENT: ICD-10-CM

## 2024-01-04 DIAGNOSIS — M70.62 TROCHANTERIC BURSITIS OF LEFT HIP: Primary | ICD-10-CM

## 2024-01-04 NOTE — PROGRESS NOTES
Physical Therapy Treatment  Note  [address] [facility] [place]   [address]     Diagnosis Plan   1. Trochanteric bursitis of left hip        2. Left hip pain        3. Chronic low back pain, unspecified back pain laterality, unspecified whether sciatica present            VISIT#: 7    Subjective   Morelulú Staton reports: current pain level at (L) hip = 2-3/10.  Pt reported discomfort at (L) hip which woke her up.  Rated pain last evening as 8-9/10 .  Had to get up and walk and stretch.       Objective     See Exercise, Manual, and Modality Logs for complete treatment.     Patient Education:    Goals- from initial evaluation 23  Plan Goals: STG's  3 weeks - 6 visits  1. Able to tolerate initial HEP and progression without increase in hip and/or back pain 2. Decrease hip/buttocks  pain > 25% with sleeping, walking, standing and other aggravating activities.      LTG's: by d/c- 12 visits   1. Improve hip strength to allow for return to normal activities without signs of instability or pain.  2.LEFs  outcome measure to score less than 10% disability.  3. Improved abdominal strength via MMT for improved postural support and stability  4. Ability to negotiate 1 flight of stairs without provocation of pain for improved community mobility     Assessment/Plan  - Pt reported she felt better with decreased pain at conclusion of session  - Pt reported she feels comfortable to D/C  with HEP following next session.  Will issue updated HEP handout and review at next treatment session.  Progress per Plan of Care            Timed:         Manual Therapy:    0   mins  90148;     Therapeutic Exercise:    40     mins  93795;     Neuromuscular Mili:    0    mins  72361;    Therapeutic Activity:     0     mins  41048;     Gait Trainin     mins  61771;     Ultrasound:     0     mins  93085;    Ionto                               0    mins   77465  Self Care                       0     mins   78150  [facility]                0    mins  4209  Aquatic                          0     mins 52308    Un-Timed:  Electrical Stimulation:    0     mins  09879 ( );  Dry Needling     0     mins self-pay  Traction     0     mins 93145  Low Eval     0     Mins  50312  Mod Eval     0     Mins  27476  High Eval                       0     Mins  10973  Re-Eval                           0    mins  34608    Timed Treatment:   40   mins   Total Treatment:     40   mins    Salima Rashid, PT PT, DPT, [id number]

## 2024-01-09 ENCOUNTER — TREATMENT (OUTPATIENT)
Dept: PHYSICAL THERAPY | Facility: CLINIC | Age: 64
End: 2024-01-09
Payer: COMMERCIAL

## 2024-01-09 DIAGNOSIS — M54.50 CHRONIC LOW BACK PAIN, UNSPECIFIED BACK PAIN LATERALITY, UNSPECIFIED WHETHER SCIATICA PRESENT: ICD-10-CM

## 2024-01-09 DIAGNOSIS — G89.29 CHRONIC LOW BACK PAIN, UNSPECIFIED BACK PAIN LATERALITY, UNSPECIFIED WHETHER SCIATICA PRESENT: ICD-10-CM

## 2024-01-09 DIAGNOSIS — M25.552 LEFT HIP PAIN: ICD-10-CM

## 2024-01-09 DIAGNOSIS — M70.62 TROCHANTERIC BURSITIS OF LEFT HIP: Primary | ICD-10-CM

## 2024-01-09 NOTE — PROGRESS NOTES
Physical Therapy  Discharge  [address] [place], [place] [address], Phone: [phone]    Patient: More Staton   : 1960  Diagnosis/ICD-10 Code:  Trochanteric bursitis of left hip [M70.62]  Referring practitioner: Kalpesh Small DO  Date of Initial Visit: Type: THERAPY  Noted: 2023  Today's Date: 2024  Patient seen for 8 sessions      Subjective:   Visit Diagnosis:    ICD-10-CM ICD-9-CM   1. Trochanteric bursitis of left hip  M70.62 726.5   2. Left hip pain  M25.552 719.45   3. Chronic low back pain, unspecified back pain laterality, unspecified whether sciatica present  M54.50 724.2    G89.29 338.29       More Staton reports: increased soreness this date due to rainy, damp weather.  Patient rated current pain level as 3/10.  Subjective Questionnaire: LEFS: 48/80  (at initial evaluation : LEFS: 38/80 indicates 47.5 % ability )  Clinical Progress: improved  Home Program Compliance: Yes  Treatment has included: therapeutic exercise and manual therapy    Subjective   Pain- past week   Current pain rating: 3  At best pain ratin  At worst pain ratin (9/10 at evaluation)     Objective     Strength/Myotome Testing      Left Hip   Planes of Motion   Flexion: 4  External rotation: 4-  Internal rotation: 3+     Right Hip   Planes of Motion   Flexion: 4+  External rotation: 5  Internal rotation: 5     Left Knee   Flexion: 5  Extension: 4     Right Knee   Flexion: 4+  Extension: 5    Muscle Activation      Additional Muscle Activation Details  Upper abs = 3/5  Lower abs = 2/5       Assessment/Plan  - Pt requests to D/C after today's session with HEP  - updated HEP handout issued and reviewed this date.   - Pt was able to return demo of HEP.   - D/C per PT  Progress toward previous goals: Partially Met    Education:  Access Code: [email or web address]  URL: [email or web address]  Date: 2024  Prepared by: Salima Rashid    Exercises  - Standing Hamstring Stretch with Step  - 1 x  daily - 7 x weekly - 3 sets - 20 hold  - Hip Flexor Stretch on Step  - 1 x daily - 7 x weekly - 3 sets - 30 hold  - Standing Lumbar Extension  - 1 x daily - 7 x weekly - 1 sets - 10 reps - 2 hold  - Standing Hip Flexion with Resistance Loop  - 1 x daily - 7 x weekly - 2 sets - 10 reps  - Hip Extension with Resistance Loop  - 1 x daily - 7 x weekly - 2 sets - 10 reps  - Hip Abduction with Resistance Loop  - 1 x daily - 7 x weekly - 2 sets - 10 reps  - Supine Single Knee to Chest Stretch  - 1 x daily - 7 x weekly - 3 sets - 10 reps - 30 hold  - Hooklying Isometric Clamshell  - 1 x daily - 7 x weekly - 2 sets - 10 reps  - Hooklying Clamshell with Resistance  - 1 x daily - 7 x weekly - 2 sets - 10 reps  - Clam with Resistance  - 1 x daily - 7 x weekly - 2 sets - 10 reps  - Supine Posterior Pelvic Tilt  - 1 x daily - 7 x weekly - 2 sets - 10 reps - 5 hold  - Supine Hip Adduction Isometric with Ball  - 1 x daily - 7 x weekly - 2 sets - 10 reps - 5 hold  - Prone Press Up On Elbows  - 1 x daily - 7 x weekly - 2 sets - 30 hold  - Prone Quadriceps Set  - 1 x daily - 7 x weekly - 2 sets - 10 reps - 5 hold  Goals    Plan Goals: STG's  3 weeks - 6 visits  1. Able to tolerate initial HEP and progression without increase in hip and/or back pain 2. Decrease hip/buttocks  pain > 25% with sleeping, walking, standing and other aggravating activities.  MET     LTG's: by d/c- 12 visits   1. Improve hip strength to allow for return to normal activities without signs of instability or pain. PROGRESSING, see objective findings.  (L) hip strength remains general weaker than (R)   2.LEFs  outcome measure to score less than 10% disability. PROGRESSING, see scoring above  3. Improved abdominal strength via MMT for improved postural support and stability PROGRESSING, see objective findings  4. Ability to negotiate 1 flight of stairs without provocation of pain for improved community mobility: VARIABLE , dependent on day and level of pain  .  Pt  reported on Saturday she was unable to bear weight through (L) LE secondary to pain from hip to foot.   Short-term goals (STG):  1   Long-term goals (LTG):    0 /4- is making progress toward goals      Recommendations: Discharge      Timed:         Manual Therapy:    0     mins  70287;     Therapeutic Exercise:    42     mins  99576;     Neuromuscular Mili:    0    mins  78707;    Therapeutic Activity:     0     mins  22478;     Gait Trainin     mins  40454;     Ultrasound:     0     mins  38049;    Ionto                               0    mins   03673  Self Care                       0     mins   30348  Aquatic                          0     mins 59258      Un-Timed:  Electrical Stimulation:    0     mins  36292 ( );  Dry Needling     0     mins self-pay  Traction     0     mins 88029  Low Eval     0     Mins  32049  Mod Eval     0     Mins  66374  High Eval                       0     Mins  99934  Re-Eval                           0    mins  91902      Timed Treatment:   42   mins   Total Treatment:     42   mins      PT Signature: Salima Rashid, OMARI  [place] License #: [id number]
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
## 2024-02-19 ENCOUNTER — OFFICE VISIT (OUTPATIENT)
Dept: PAIN MEDICINE | Facility: CLINIC | Age: 64
End: 2024-02-19
Payer: COMMERCIAL

## 2024-02-19 VITALS
DIASTOLIC BLOOD PRESSURE: 69 MMHG | OXYGEN SATURATION: 99 % | WEIGHT: 179 LBS | SYSTOLIC BLOOD PRESSURE: 157 MMHG | BODY MASS INDEX: 28.04 KG/M2 | RESPIRATION RATE: 16 BRPM | HEART RATE: 62 BPM

## 2024-02-19 DIAGNOSIS — M53.3 SACROILIAC JOINT DYSFUNCTION: ICD-10-CM

## 2024-02-19 DIAGNOSIS — M47.816 LUMBAR SPONDYLOSIS: ICD-10-CM

## 2024-02-19 DIAGNOSIS — M70.62 TROCHANTERIC BURSITIS OF LEFT HIP: Primary | ICD-10-CM

## 2024-02-19 DIAGNOSIS — M25.559 HIP PAIN, UNSPECIFIED LATERALITY: ICD-10-CM

## 2024-02-19 PROCEDURE — 99213 OFFICE O/P EST LOW 20 MIN: CPT | Performed by: STUDENT IN AN ORGANIZED HEALTH CARE EDUCATION/TRAINING PROGRAM

## 2024-05-06 DIAGNOSIS — M53.3 SACROILIAC JOINT DYSFUNCTION: ICD-10-CM

## 2024-05-06 DIAGNOSIS — M70.62 TROCHANTERIC BURSITIS OF LEFT HIP: Primary | ICD-10-CM

## 2024-05-06 DIAGNOSIS — M47.816 LUMBAR SPONDYLOSIS: ICD-10-CM

## 2024-06-14 ENCOUNTER — OFFICE VISIT (OUTPATIENT)
Dept: ORTHOPEDIC SURGERY | Facility: CLINIC | Age: 64
End: 2024-06-14
Payer: COMMERCIAL

## 2024-06-14 VITALS — OXYGEN SATURATION: 100 % | HEART RATE: 64 BPM | HEIGHT: 67 IN | BODY MASS INDEX: 26.68 KG/M2 | WEIGHT: 170 LBS

## 2024-06-14 DIAGNOSIS — M17.12 PRIMARY OSTEOARTHRITIS OF LEFT KNEE: Primary | ICD-10-CM

## 2024-06-14 RX ORDER — MELOXICAM 15 MG/1
15 TABLET ORAL DAILY PRN
Qty: 30 TABLET | Refills: 4 | Status: SHIPPED | OUTPATIENT
Start: 2024-06-14
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
## 2024-07-09 ENCOUNTER — OFFICE VISIT (OUTPATIENT)
Dept: ORTHOPEDIC SURGERY | Facility: CLINIC | Age: 64
End: 2024-07-09
Payer: COMMERCIAL

## 2024-07-09 VITALS
OXYGEN SATURATION: 99 % | HEART RATE: 70 BPM | WEIGHT: 170 LBS | HEIGHT: 67 IN | BODY MASS INDEX: 26.68 KG/M2 | RESPIRATION RATE: 20 BRPM

## 2024-07-09 DIAGNOSIS — M17.12 PRIMARY OSTEOARTHRITIS OF LEFT KNEE: Primary | ICD-10-CM

## 2024-07-09 PROCEDURE — 20610 DRAIN/INJ JOINT/BURSA W/O US: CPT | Performed by: FAMILY MEDICINE

## 2024-07-09 PROCEDURE — 99213 OFFICE O/P EST LOW 20 MIN: CPT | Performed by: FAMILY MEDICINE

## 2024-07-09 RX ORDER — TRIAMCINOLONE ACETONIDE 40 MG/ML
80 INJECTION, SUSPENSION INTRA-ARTICULAR; INTRAMUSCULAR
Status: COMPLETED | OUTPATIENT
Start: 2024-07-09 | End: 2024-07-09

## 2024-07-09 RX ADMIN — TRIAMCINOLONE ACETONIDE 80 MG: 40 INJECTION, SUSPENSION INTRA-ARTICULAR; INTRAMUSCULAR at 14:28
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
## 2024-07-16 DIAGNOSIS — M17.12 PRIMARY OSTEOARTHRITIS OF LEFT KNEE: Primary | ICD-10-CM

## 2024-07-16 NOTE — TELEPHONE ENCOUNTER
I called patient to inform her that Dr. Pacheco is out this week. She is wanting to know what else she can take?

## 2024-07-16 NOTE — TELEPHONE ENCOUNTER
----- Message from [facility] [employer] sent at 7/15/2024  6:26 PM EDT -----  Regarding: Meloxicam  Contact: [phone]  I quit taking meloxicam I started itching because of med ,continuously itching everywhere  so is there something else I can take ? Please let me know thank you More Staton

## 2024-07-22 NOTE — TELEPHONE ENCOUNTER
I called patient and she stated Dr. Mayes had tried having her take celebrex and diclofenac already before and they did not help. She also stated she did not realize how much the Meloxicam was helping until she stopped it, but she can not itch like she did.

## 2024-08-06 ENCOUNTER — OFFICE VISIT (OUTPATIENT)
Dept: FAMILY MEDICINE CLINIC | Facility: CLINIC | Age: 64
End: 2024-08-06
Payer: COMMERCIAL

## 2024-08-06 VITALS
RESPIRATION RATE: 18 BRPM | BODY MASS INDEX: 28.56 KG/M2 | OXYGEN SATURATION: 97 % | WEIGHT: 182 LBS | HEIGHT: 67 IN | HEART RATE: 89 BPM | SYSTOLIC BLOOD PRESSURE: 151 MMHG | DIASTOLIC BLOOD PRESSURE: 72 MMHG

## 2024-08-06 DIAGNOSIS — M25.562 CHRONIC PAIN OF LEFT KNEE: Primary | ICD-10-CM

## 2024-08-06 DIAGNOSIS — M25.552 CHRONIC LEFT HIP PAIN: ICD-10-CM

## 2024-08-06 DIAGNOSIS — G89.29 CHRONIC PAIN OF LEFT KNEE: Primary | ICD-10-CM

## 2024-08-06 DIAGNOSIS — G89.29 CHRONIC LEFT HIP PAIN: ICD-10-CM

## 2024-08-06 DIAGNOSIS — M76.32 ILIOTIBIAL BAND SYNDROME OF LEFT SIDE: ICD-10-CM

## 2024-08-06 DIAGNOSIS — M70.62 GREATER TROCHANTERIC BURSITIS OF LEFT HIP: ICD-10-CM

## 2024-08-06 PROCEDURE — 20610 DRAIN/INJ JOINT/BURSA W/O US: CPT | Performed by: STUDENT IN AN ORGANIZED HEALTH CARE EDUCATION/TRAINING PROGRAM

## 2024-08-06 PROCEDURE — 99213 OFFICE O/P EST LOW 20 MIN: CPT | Performed by: STUDENT IN AN ORGANIZED HEALTH CARE EDUCATION/TRAINING PROGRAM

## 2024-08-06 RX ORDER — TRIAMCINOLONE ACETONIDE 40 MG/ML
40 INJECTION, SUSPENSION INTRA-ARTICULAR; INTRAMUSCULAR
Status: COMPLETED | OUTPATIENT
Start: 2024-08-06 | End: 2024-08-06

## 2024-08-06 RX ADMIN — TRIAMCINOLONE ACETONIDE 40 MG: 40 INJECTION, SUSPENSION INTRA-ARTICULAR; INTRAMUSCULAR at 15:16

## 2024-08-06 NOTE — PROGRESS NOTES
"Chief Complaint  Chief Complaint   Patient presents with    Knee Pain     Left knee pain    Hip Pain     Left hip pain     Subjective        More Staton is a 63 y.o. female who presents to [facility].    History of Present Illness  Here for acute visit for L knee and hip pain.   Recently saw Dr. Pacheco for L knee pain - recent steroid injection on 7/9/2024.    She also sees Dr. Small w/ pain management.  She has a topical cream which she uses as needed.   The pain originates in her L hip.  It started a couple years ago when her hip started popping.  Dr. Mayes tried a hip steroid injection w/ minimal benefit.  She completed PT a few months ago w/o much benefit.    Her IT band hurts all the way down the side of her leg.    Objective   /72   Pulse 89   Resp 18   Ht 170.2 cm (67\")   Wt 82.6 kg (182 lb)   SpO2 97%   BMI 28.51 kg/m²     Estimated body mass index is 28.51 kg/m² as calculated from the following:    Height as of this encounter: 170.2 cm (67\").    Weight as of this encounter: 82.6 kg (182 lb).     Physical Exam   GEN: In no acute distress, non toxic appearing  MSK: Tight IT band on the L.  Ttp over piriformis as well as greater trochanter.      Result Review :              Assessment and Plan     Diagnoses and all orders for this visit:    1. Chronic pain of left knee (Primary)  2. Chronic left hip pain  3. Iliotibial band syndrome of left side  4. Greater trochanteric bursitis of left hip  Based on her history and physical I suspect the IT band may be contributing to the left knee pain.  She certainly has greater trochanteric bursitis of the left hip.  She also has tenderness to palpation over her piriformis so could be some contributing piriformis syndrome.  She is already completed a course of formal physical therapy, I gave her some stretches and exercises specifically for the IT band.  I recommended a foam roller.  Can continue using topical cream as " needed.  We discussed steroid injection of the left greater trochanter, risk/benefits/alternatives and decided to pursue this through shared decision making.    L Greater Trochanter Hip Injection    Date/Time: 8/6/2024 3:16 PM    Performed by: David Munoz DO  Authorized by: David Munoz DO  Indications: pain   Body area: hip  Joint: left hip  Local anesthesia used: no    Anesthesia:  Local anesthesia used: no    Sedation:  Patient sedated: no    Preparation: Patient was prepped and draped in the usual sterile fashion.  Needle gauge: 25G.  Ultrasound guidance: no  Approach: lateral  Meds administered: 40 mg triamcinolone acetonide 40 MG/ML (3 mL 1% lidocaine)  Patient tolerance: patient tolerated the procedure well with no immediate complications          Follow Up   If no improvement w/ above

## 2024-10-04 ENCOUNTER — TRANSCRIBE ORDERS (OUTPATIENT)
Dept: ADMINISTRATIVE | Facility: HOSPITAL | Age: 64
End: 2024-10-04
Payer: COMMERCIAL

## 2024-10-04 DIAGNOSIS — Z12.31 SCREENING MAMMOGRAM FOR BREAST CANCER: Primary | ICD-10-CM

## 2024-10-20 DIAGNOSIS — M47.816 LUMBAR SPONDYLOSIS: Primary | ICD-10-CM

## 2024-11-12 ENCOUNTER — PATIENT MESSAGE (OUTPATIENT)
Dept: FAMILY MEDICINE CLINIC | Facility: CLINIC | Age: 64
End: 2024-11-12
Payer: COMMERCIAL

## 2024-11-12 DIAGNOSIS — Z00.00 ANNUAL PHYSICAL EXAM: Primary | ICD-10-CM

## 2024-11-21 ENCOUNTER — OFFICE VISIT (OUTPATIENT)
Dept: ORTHOPEDIC SURGERY | Facility: CLINIC | Age: 64
End: 2024-11-21
Payer: COMMERCIAL

## 2024-11-21 VITALS — WEIGHT: 184.6 LBS | HEART RATE: 57 BPM | OXYGEN SATURATION: 94 % | HEIGHT: 67 IN | BODY MASS INDEX: 28.97 KG/M2

## 2024-11-21 DIAGNOSIS — M17.12 PRIMARY OSTEOARTHRITIS OF LEFT KNEE: Primary | ICD-10-CM

## 2024-11-21 RX ORDER — TRIAMCINOLONE ACETONIDE 40 MG/ML
80 INJECTION, SUSPENSION INTRA-ARTICULAR; INTRAMUSCULAR
Status: COMPLETED | OUTPATIENT
Start: 2024-11-21 | End: 2024-11-21

## 2024-11-21 RX ORDER — LIDOCAINE HYDROCHLORIDE 10 MG/ML
4 INJECTION, SOLUTION EPIDURAL; INFILTRATION; INTRACAUDAL; PERINEURAL
Status: COMPLETED | OUTPATIENT
Start: 2024-11-21 | End: 2024-11-21

## 2024-11-21 RX ADMIN — TRIAMCINOLONE ACETONIDE 80 MG: 40 INJECTION, SUSPENSION INTRA-ARTICULAR; INTRAMUSCULAR at 08:37

## 2024-11-21 RX ADMIN — LIDOCAINE HYDROCHLORIDE 4 ML: 10 INJECTION, SOLUTION EPIDURAL; INFILTRATION; INTRACAUDAL; PERINEURAL at 08:37
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
## 2024-11-22 ENCOUNTER — PATIENT ROUNDING (BHMG ONLY) (OUTPATIENT)
Dept: ORTHOPEDIC SURGERY | Facility: CLINIC | Age: 64
End: 2024-11-22
Payer: COMMERCIAL

## 2024-12-05 ENCOUNTER — HOSPITAL ENCOUNTER (OUTPATIENT)
Dept: MAMMOGRAPHY | Facility: HOSPITAL | Age: 64
Discharge: HOME | End: 2024-12-05
Payer: COMMERCIAL

## 2024-12-05 ENCOUNTER — HOSPITAL ENCOUNTER (OUTPATIENT)
Dept: PAIN MEDICINE | Facility: HOSPITAL | Age: 64
Discharge: HOME | End: 2024-12-05
Payer: COMMERCIAL

## 2024-12-05 VITALS
SYSTOLIC BLOOD PRESSURE: 145 MMHG | HEART RATE: 54 BPM | HEIGHT: 67 IN | DIASTOLIC BLOOD PRESSURE: 71 MMHG | BODY MASS INDEX: 28.88 KG/M2 | WEIGHT: 184 LBS | RESPIRATION RATE: 16 BRPM | OXYGEN SATURATION: 99 % | TEMPERATURE: 97.1 F

## 2024-12-05 DIAGNOSIS — Z12.31 SCREENING MAMMOGRAM FOR BREAST CANCER: ICD-10-CM

## 2024-12-05 DIAGNOSIS — R52 PAIN: ICD-10-CM

## 2024-12-05 DIAGNOSIS — M47.816 LUMBAR SPONDYLOSIS: Primary | ICD-10-CM

## 2024-12-05 PROCEDURE — 25010000002 LIDOCAINE 2% SOLUTION: Performed by: STUDENT IN AN ORGANIZED HEALTH CARE EDUCATION/TRAINING PROGRAM

## 2024-12-05 PROCEDURE — 25010000002 LIDOCAINE PF 1% 1 % SOLUTION: Performed by: STUDENT IN AN ORGANIZED HEALTH CARE EDUCATION/TRAINING PROGRAM

## 2024-12-05 PROCEDURE — 77067 SCR MAMMO BI INCL CAD: CPT

## 2024-12-05 PROCEDURE — 77003 FLUOROGUIDE FOR SPINE INJECT: CPT

## 2024-12-05 PROCEDURE — 77063 BREAST TOMOSYNTHESIS BI: CPT

## 2024-12-05 RX ORDER — LIDOCAINE HYDROCHLORIDE 20 MG/ML
5 INJECTION, SOLUTION INFILTRATION; PERINEURAL ONCE
Status: COMPLETED | OUTPATIENT
Start: 2024-12-05 | End: 2024-12-05

## 2024-12-05 RX ORDER — LIDOCAINE HYDROCHLORIDE 10 MG/ML
10 INJECTION, SOLUTION EPIDURAL; INFILTRATION; INTRACAUDAL; PERINEURAL ONCE
Status: COMPLETED | OUTPATIENT
Start: 2024-12-05 | End: 2024-12-05

## 2024-12-05 RX ORDER — LIDOCAINE HYDROCHLORIDE 20 MG/ML
10 INJECTION, SOLUTION INFILTRATION; PERINEURAL ONCE
Status: COMPLETED | OUTPATIENT
Start: 2024-12-05 | End: 2024-12-05

## 2024-12-05 RX ADMIN — LIDOCAINE HYDROCHLORIDE 2 ML: 20 INJECTION, SOLUTION INFILTRATION; PERINEURAL at 10:53

## 2024-12-05 RX ADMIN — LIDOCAINE HYDROCHLORIDE 10 ML: 10 INJECTION, SOLUTION EPIDURAL; INFILTRATION; INTRACAUDAL; PERINEURAL at 10:42

## 2024-12-05 RX ADMIN — LIDOCAINE HYDROCHLORIDE 10 ML: 20 INJECTION, SOLUTION INFILTRATION; PERINEURAL at 10:49

## 2024-12-05 NOTE — DISCHARGE INSTRUCTIONS
Radiofrequency Lesioning, Care After    Refer to this sheet in the next few weeks. These instructions provide you with information about caring for yourself after your procedure. Your health care provider may also give you more specific instructions. Your treatment has been planned according to current medical practices, but problems sometimes occur. Call your health care provider if you have any problems or questions after your procedure.  What can I expect after the procedure?  After the procedure, it is common to have:  Pain from the burned nerve.  You may feel a burning sensation for up to 1-2 weeks after the procedure  Temporary numbness at the site  Your leg/legs may be weak or feel numb after the procedure until the numbing medication wears off.  When you are up and walking, have assistance to prevent falling.     Follow these instructions at home:  Take over-the-counter and prescription medicines only as told by your health care provider.  Return to your normal activities as told by your health care provider. Ask your health care provider what activities are safe for you.  Pay close attention to how you feel after the procedure. If you start to have pain, write down when it hurts and how it feels. This will help you and your health care provider to know if you need an additional treatment.  Check your needle insertion site every day for signs of infection. Watch for:  Redness, swelling, or pain.  Fluid, blood, or pus.  Keep all follow-up visits as told by your health care provider. This is important.  No driving for 24 hrs-make sure you have full sensation in your legs prior to driving.  Avoid using heat on the injection site for 24 hours. You may use ice intermittently if needed by placing a               towel between your skin and the ice bag and using the ice for 20 minutes 2-3 times a day.  Do not take baths, swim or use a hot tub for 24 hours.  Leave your band-aids on for 24 hours and keep them  dry.    Contact a health care provider if:  Your pain does not get better.  You have redness, swelling, or pain at the needle insertion site.  You have fluid, blood, or pus coming from the needle insertion site.  You have a fever over 101 degrees.  You have new numb.ness in your arm or leg after the procedure    Get help right away if:  You develop sudden, severe pain.  You develop numbness or tingling near the procedure site that does not go away.  This information is not intended to replace advice given to you by your health care provider. Make sure you discuss any questions you have with your health care provider.  Document Released: 08/16/2012 Document Revised: 05/25/2017 Document Reviewed: 01/25/2016  [employer] Interactive Patient Education © 2019 [employer]

## 2024-12-05 NOTE — H&P
H and P reviewed from previous visit and no changes to patient's clinical presentation. Will proceed with procedure as planned. Patient denies history of DM and being on blood thinners.    Kalpesh Small DO  Pain Management   [facility]

## 2024-12-05 NOTE — PROCEDURES
Preoperative Diagnosis:    Lumbar Spondylosis                                              Postoperative Diagnosis: SAME    PROCEDURE:  Radiofrequency Ablation (RFA) of Lumbar Medial Branch at BILATERAL L 4, 5, sacro ala and S1    NOTE:  After obtaining written informed consent patient was taken to the procedure room. Pre-procedure blood pressure and pulse were stable and recorded in patients clinic chart.   The patient was placed in a prone position and lumbar area was prepped with chloraprep times two and draped in the usual sterile fashion.  The skin over the left L 4 transverse process with vertebral body was infiltrated with 1% lidocaine for local anesthesia.  A 20-gauge  mm needle with 5 mm active was inserted into the left L3 medial branch under radiographic guidance. Both AP and lateral views were obtained. The identical procedure was performed on left L 5, sacro ala and S1 medial branch. Following placement of the needle sensory stimulation at 50 Hz and motor stimulation at 2 Hz was carried out. 1 cc of 2% lidocaine was injected. RFA was carried out in lesion mode after patient reporting reproduction of pain or sensation in the area of symptoms and denying extremity motor sensations. The settings were 80°C, and 90 seconds. During the procedure no pain was reported in the extremities by the patient.     Same procedure was repeated at RIGHT L4, L5, Sa, S1 levels without any complications.     After the procedure the needles were removed. Skin was cleaned and a sterile dressing was applied. Following the procedure the patient's vital signs were stable. The patient was discharged.     COMPLICATIONS: None    RFA DATA: In chart     Kalpesh Small DO  Pain Management   [facility]

## 2024-12-06 ENCOUNTER — TELEPHONE (OUTPATIENT)
Dept: PAIN MEDICINE | Facility: HOSPITAL | Age: 64
End: 2024-12-06
Payer: COMMERCIAL

## 2024-12-10 ENCOUNTER — LAB (OUTPATIENT)
Dept: FAMILY MEDICINE CLINIC | Facility: CLINIC | Age: 64
End: 2024-12-10
Payer: COMMERCIAL

## 2024-12-10 DIAGNOSIS — Z00.00 ANNUAL PHYSICAL EXAM: ICD-10-CM

## 2024-12-10 LAB
ALBUMIN SERPL-MCNC: 3.8 G/DL (ref 3.5–5.2)
ALBUMIN/GLOB SERPL: 1.4 G/DL
ALP SERPL-CCNC: 52 U/L (ref 39–117)
ALT SERPL W P-5'-P-CCNC: 9 U/L (ref 1–33)
ANION GAP SERPL CALCULATED.3IONS-SCNC: 9.4 MMOL/L (ref 5–15)
AST SERPL-CCNC: 15 U/L (ref 1–32)
BASOPHILS # BLD AUTO: 0.07 10*3/MM3 (ref 0–0.2)
BASOPHILS NFR BLD AUTO: 1.5 % (ref 0–1.5)
BILIRUB SERPL-MCNC: 0.5 MG/DL (ref 0–1.2)
BUN SERPL-MCNC: 22 MG/DL (ref 8–23)
BUN/CREAT SERPL: 25.3 (ref 7–25)
CALCIUM SPEC-SCNC: 9.4 MG/DL (ref 8.6–10.5)
CHLORIDE SERPL-SCNC: 107 MMOL/L (ref 98–107)
CHOLEST SERPL-MCNC: 210 MG/DL (ref 0–200)
CO2 SERPL-SCNC: 26.6 MMOL/L (ref 22–29)
CREAT SERPL-MCNC: 0.87 MG/DL (ref 0.57–1)
DEPRECATED RDW RBC AUTO: 40.5 FL (ref 37–54)
EGFRCR SERPLBLD CKD-EPI 2021: 74 ML/MIN/1.73
EOSINOPHIL # BLD AUTO: 0.08 10*3/MM3 (ref 0–0.4)
EOSINOPHIL NFR BLD AUTO: 1.7 % (ref 0.3–6.2)
ERYTHROCYTE [DISTWIDTH] IN BLOOD BY AUTOMATED COUNT: 12.8 % (ref 12.3–15.4)
GLOBULIN UR ELPH-MCNC: 2.8 GM/DL
GLUCOSE SERPL-MCNC: 70 MG/DL (ref 65–99)
HBA1C MFR BLD: 4.9 % (ref 4.8–5.6)
HCT VFR BLD AUTO: 40.5 % (ref 34–46.6)
HDLC SERPL-MCNC: 61 MG/DL (ref 40–60)
HGB BLD-MCNC: 13.3 G/DL (ref 12–15.9)
IMM GRANULOCYTES # BLD AUTO: 0.01 10*3/MM3 (ref 0–0.05)
IMM GRANULOCYTES NFR BLD AUTO: 0.2 % (ref 0–0.5)
LDLC SERPL CALC-MCNC: 137 MG/DL (ref 0–100)
LDLC/HDLC SERPL: 2.23 {RATIO}
LYMPHOCYTES # BLD AUTO: 1.69 10*3/MM3 (ref 0.7–3.1)
LYMPHOCYTES NFR BLD AUTO: 35.2 % (ref 19.6–45.3)
MCH RBC QN AUTO: 29 PG (ref 26.6–33)
MCHC RBC AUTO-ENTMCNC: 32.8 G/DL (ref 31.5–35.7)
MCV RBC AUTO: 88.2 FL (ref 79–97)
MONOCYTES # BLD AUTO: 0.68 10*3/MM3 (ref 0.1–0.9)
MONOCYTES NFR BLD AUTO: 14.2 % (ref 5–12)
NEUTROPHILS NFR BLD AUTO: 2.27 10*3/MM3 (ref 1.7–7)
NEUTROPHILS NFR BLD AUTO: 47.2 % (ref 42.7–76)
NRBC BLD AUTO-RTO: 0 /100 WBC (ref 0–0.2)
PLATELET # BLD AUTO: 214 10*3/MM3 (ref 140–450)
PMV BLD AUTO: 11.4 FL (ref 6–12)
POTASSIUM SERPL-SCNC: 4.6 MMOL/L (ref 3.5–5.2)
PROT SERPL-MCNC: 6.6 G/DL (ref 6–8.5)
RBC # BLD AUTO: 4.59 10*6/MM3 (ref 3.77–5.28)
SODIUM SERPL-SCNC: 143 MMOL/L (ref 136–145)
TRIGL SERPL-MCNC: 65 MG/DL (ref 0–150)
TSH SERPL DL<=0.05 MIU/L-ACNC: 1.14 UIU/ML (ref 0.27–4.2)
VLDLC SERPL-MCNC: 12 MG/DL (ref 5–40)
WBC NRBC COR # BLD AUTO: 4.8 10*3/MM3 (ref 3.4–10.8)

## 2024-12-10 PROCEDURE — 85025 COMPLETE CBC W/AUTO DIFF WBC: CPT | Performed by: STUDENT IN AN ORGANIZED HEALTH CARE EDUCATION/TRAINING PROGRAM

## 2024-12-10 PROCEDURE — 80053 COMPREHEN METABOLIC PANEL: CPT | Performed by: STUDENT IN AN ORGANIZED HEALTH CARE EDUCATION/TRAINING PROGRAM

## 2024-12-10 PROCEDURE — 80061 LIPID PANEL: CPT | Performed by: STUDENT IN AN ORGANIZED HEALTH CARE EDUCATION/TRAINING PROGRAM

## 2024-12-10 PROCEDURE — 36415 COLL VENOUS BLD VENIPUNCTURE: CPT

## 2024-12-10 PROCEDURE — 83036 HEMOGLOBIN GLYCOSYLATED A1C: CPT | Performed by: STUDENT IN AN ORGANIZED HEALTH CARE EDUCATION/TRAINING PROGRAM

## 2024-12-10 PROCEDURE — 84443 ASSAY THYROID STIM HORMONE: CPT | Performed by: STUDENT IN AN ORGANIZED HEALTH CARE EDUCATION/TRAINING PROGRAM

## 2024-12-17 ENCOUNTER — OFFICE VISIT (OUTPATIENT)
Dept: FAMILY MEDICINE CLINIC | Facility: CLINIC | Age: 64
End: 2024-12-17
Payer: COMMERCIAL

## 2024-12-17 VITALS
WEIGHT: 180.6 LBS | SYSTOLIC BLOOD PRESSURE: 128 MMHG | OXYGEN SATURATION: 99 % | DIASTOLIC BLOOD PRESSURE: 86 MMHG | RESPIRATION RATE: 18 BRPM | HEIGHT: 67 IN | BODY MASS INDEX: 28.35 KG/M2 | HEART RATE: 65 BPM

## 2024-12-17 DIAGNOSIS — E03.9 ACQUIRED HYPOTHYROIDISM: ICD-10-CM

## 2024-12-17 DIAGNOSIS — Z00.00 ANNUAL PHYSICAL EXAM: Primary | ICD-10-CM

## 2024-12-17 PROCEDURE — 99397 PER PM REEVAL EST PAT 65+ YR: CPT | Performed by: STUDENT IN AN ORGANIZED HEALTH CARE EDUCATION/TRAINING PROGRAM

## 2024-12-17 NOTE — PROGRESS NOTES
"Chief Complaint  Chief Complaint   Patient presents with    Knee Pain     Left knee    Annual Exam     Subjective        More Staton is a 64 y.o. female who presents to [facility].    History of Present Illness  Here for annual physical.    Diet: she mostly does keto diet and has lost 80-90 lbs.  Drinks mostly water.    Exercise: lots of walking w/ work, 6-10K steps.    Sleep: never has been a good sleeper, average 4-5 hrs.  She overall isn't fatigued all the time.      Labs reviewed from 12/10, overall looked good.      Objective   /86   Pulse 65   Resp 18   Ht 170.2 cm (67\")   Wt 81.9 kg (180 lb 9.6 oz)   SpO2 99%   BMI 28.29 kg/m²     Estimated body mass index is 28.29 kg/m² as calculated from the following:    Height as of this encounter: 170.2 cm (67\").    Weight as of this encounter: 81.9 kg (180 lb 9.6 oz).     Physical Exam   GEN: In no acute distress, non toxic appearing  HEENT: Pupils equal and reactive to light, sclera clear. Mucous membranes moist. Oropharynx without erythema or exudate. No cervical or submandibular lymphadenopathy.  Bilateral TM's wnl.    CV: Regular rate and rhythm, no murmurs, 2+ peripheral pulses, No extremity edema.   RESP: Lungs clear to auscultation anteriorly and posteriorly in all lung fields bilaterally.  NEURO: AAO to person, place, and time. CN 2-12 intact grossly.  PSYCH: Affect normal, insight fair     Result Review :              Assessment and Plan     Diagnoses and all orders for this visit:    1. Annual physical exam (Primary)    2. Acquired hypothyroidism    Overall reassuring exam.  Blood pressure goal today.  Blood work reviewed and overall reassuring.  Continue regular physical activity.  Continue healthy diet with plenty of vegetables, water.  Continue physical therapy for left knee.  Continue levothyroxine 112 mcg daily, TSH WNL.  Colonoscopy October 2022, repeat in 2025.  Mammogram negative few weeks ago.  DEXA " with osteopenia in January 2024, repeat in 2026.  Next physical in 1 year, follow-up sooner if needed.       Follow Up     Return in about 1 year (around 12/17/2025) for Annual physical.

## 2025-01-24 DIAGNOSIS — F41.9 ANXIETY: Primary | ICD-10-CM

## 2025-01-24 RX ORDER — ALPRAZOLAM 0.25 MG/1
0.12 TABLET ORAL 2 TIMES DAILY PRN
Qty: 5 TABLET | Refills: 0 | Status: SHIPPED | OUTPATIENT
Start: 2025-01-24

## 2025-08-04 ENCOUNTER — TRANSCRIBE ORDERS (OUTPATIENT)
Dept: ADMINISTRATIVE | Facility: HOSPITAL | Age: 65
End: 2025-08-04
Payer: COMMERCIAL

## 2025-08-04 ENCOUNTER — PATIENT MESSAGE (OUTPATIENT)
Dept: FAMILY MEDICINE CLINIC | Facility: CLINIC | Age: 65
End: 2025-08-04
Payer: COMMERCIAL

## 2025-08-04 DIAGNOSIS — Z12.31 VISIT FOR SCREENING MAMMOGRAM: Primary | ICD-10-CM

## 2025-08-04 DIAGNOSIS — Z12.31 ENCOUNTER FOR SCREENING MAMMOGRAM FOR MALIGNANT NEOPLASM OF BREAST: Primary | ICD-10-CM

## (undated) DEVICE — NEEDLE, QUINCKE, 20GX3.5": Brand: MEDLINE

## (undated) DEVICE — STERILE PATIENT PROTECTIVE PAD FOR IMP® KNEE POSITIONERS & COHESIVE WRAP (10 / CASE): Brand: DE MAYO KNEE POSITIONER®

## (undated) DEVICE — PAD COLD/THRP KN POLAR/CARE WRAP/ON XL

## (undated) DEVICE — PENCL EVAC ULTRAVAC SMOKE W/BLD

## (undated) DEVICE — SOL ISO/ALC RUB 70PCT 4OZ

## (undated) DEVICE — CEMENT MIXING SYSTEM WITH FEMORAL BREAKWAY NOZZLE: Brand: REVOLUTION

## (undated) DEVICE — IRRIGATOR BULB ASEPTO 60CC STRL

## (undated) DEVICE — SOL NACL 0.9PCT 1000ML

## (undated) DEVICE — SOL IRR NACL 0.9PCT 3000ML

## (undated) DEVICE — ZIPPERED TOGA, 2X LARGE: Brand: FLYTE

## (undated) DEVICE — GLV SURG SENSICARE PI ORTHO PF SZ7 LF STRL

## (undated) DEVICE — UNDERGLV SURG BIOGEL INDICAT PI SZ8.5 BLU

## (undated) DEVICE — HANDPIECE SET WITH COAXIAL MULTI-ORIFICE TIP AND SUCTION TUBE: Brand: INTERPULSE

## (undated) DEVICE — GOWN,REINFRCE,POLY,SIRUS,BREATH SLV,XXLG: Brand: MEDLINE

## (undated) DEVICE — KT SURG TURNOVER 050

## (undated) DEVICE — DRSNG BARR INSUL KN POLAR/CARE S/ADHS LG

## (undated) DEVICE — DUAL CUT SAGITTAL BLADE

## (undated) DEVICE — SYR LUERLOK 20CC BX/50

## (undated) DEVICE — PK TOTL KN 50

## (undated) DEVICE — SYS COLD/THRP POLAR/CARE/CUBE

## (undated) DEVICE — GLV SURG SENSICARE PI PF LF 7 GRN STRL

## (undated) DEVICE — SUT VIC 0 CT1 36IN J946H

## (undated) DEVICE — GLV SURG SENSICARE PI ORTHO SZ8.5 LF STRL

## (undated) DEVICE — APPL CHLORAPREP HI/LITE 26ML ORNG

## (undated) DEVICE — ZIP 24 SURGICAL SKIN CLOSURE DEVICE, PSA: Brand: ZIP 24 SURGICAL SKIN CLOSURE DEVICE